# Patient Record
Sex: MALE | Employment: UNEMPLOYED | URBAN - METROPOLITAN AREA
[De-identification: names, ages, dates, MRNs, and addresses within clinical notes are randomized per-mention and may not be internally consistent; named-entity substitution may affect disease eponyms.]

---

## 2018-06-07 ENCOUNTER — HOSPITAL ENCOUNTER (INPATIENT)
Age: 58
LOS: 3 days | Discharge: HOME | DRG: 254 | End: 2018-06-10
Attending: EMERGENCY MEDICINE | Admitting: HOSPITALIST
Payer: MEDICAID

## 2018-06-07 DIAGNOSIS — K25.4 GASTROINTESTINAL HEMORRHAGE ASSOCIATED WITH GASTRIC ULCER: Primary | ICD-10-CM

## 2018-06-07 DIAGNOSIS — F10.288 ALCOHOL DEPENDENCE WITH OTHER ALCOHOL-INDUCED DISORDER (HCC): ICD-10-CM

## 2018-06-07 PROBLEM — I10 HYPERTENSION: Status: ACTIVE | Noted: 2018-06-07

## 2018-06-07 PROBLEM — E87.1 HYPONATREMIA: Status: ACTIVE | Noted: 2018-06-07

## 2018-06-07 PROBLEM — K25.0 ACUTE GASTRIC ULCER WITH HEMORRHAGE: Status: ACTIVE | Noted: 2018-06-07

## 2018-06-07 PROBLEM — F10.10 ALCOHOL ABUSE: Status: ACTIVE | Noted: 2018-06-07

## 2018-06-07 LAB
BASOPHILS # BLD: 0.1 K/UL (ref 0–0.1)
BASOPHILS NFR BLD: 1 % (ref 0–1)
DIFFERENTIAL METHOD BLD: ABNORMAL
EOSINOPHIL # BLD: 0.2 K/UL (ref 0–0.4)
EOSINOPHIL NFR BLD: 2 % (ref 0–7)
ERYTHROCYTE [DISTWIDTH] IN BLOOD BY AUTOMATED COUNT: 18.9 % (ref 11.5–14.5)
HCT VFR BLD AUTO: 21.3 % (ref 36.6–50.3)
HGB BLD-MCNC: 6.5 G/DL (ref 12.1–17)
IMM GRANULOCYTES # BLD: 0 K/UL (ref 0–0.04)
IMM GRANULOCYTES NFR BLD AUTO: 0 % (ref 0–0.5)
LYMPHOCYTES # BLD: 0.8 K/UL (ref 0.8–3.5)
LYMPHOCYTES NFR BLD: 10 % (ref 12–49)
MCH RBC QN AUTO: 23.7 PG (ref 26–34)
MCHC RBC AUTO-ENTMCNC: 30.5 G/DL (ref 30–36.5)
MCV RBC AUTO: 77.7 FL (ref 80–99)
MONOCYTES # BLD: 0.7 K/UL (ref 0–1)
MONOCYTES NFR BLD: 9 % (ref 5–13)
NEUTS SEG # BLD: 5.9 K/UL (ref 1.8–8)
NEUTS SEG NFR BLD: 78 % (ref 32–75)
NRBC # BLD: 0 K/UL (ref 0–0.01)
NRBC BLD-RTO: 0 PER 100 WBC
PLATELET # BLD AUTO: 511 K/UL (ref 150–400)
PMV BLD AUTO: 8.8 FL (ref 8.9–12.9)
RBC # BLD AUTO: 2.74 M/UL (ref 4.1–5.7)
RBC MORPH BLD: ABNORMAL
WBC # BLD AUTO: 7.7 K/UL (ref 4.1–11.1)

## 2018-06-07 PROCEDURE — 74011250636 HC RX REV CODE- 250/636: Performed by: EMERGENCY MEDICINE

## 2018-06-07 PROCEDURE — 99284 EMERGENCY DEPT VISIT MOD MDM: CPT

## 2018-06-07 PROCEDURE — 86923 COMPATIBILITY TEST ELECTRIC: CPT | Performed by: EMERGENCY MEDICINE

## 2018-06-07 PROCEDURE — 96374 THER/PROPH/DIAG INJ IV PUSH: CPT

## 2018-06-07 PROCEDURE — 74011000250 HC RX REV CODE- 250: Performed by: HOSPITALIST

## 2018-06-07 PROCEDURE — 36415 COLL VENOUS BLD VENIPUNCTURE: CPT | Performed by: EMERGENCY MEDICINE

## 2018-06-07 PROCEDURE — 85025 COMPLETE CBC W/AUTO DIFF WBC: CPT | Performed by: EMERGENCY MEDICINE

## 2018-06-07 PROCEDURE — 65660000000 HC RM CCU STEPDOWN

## 2018-06-07 PROCEDURE — 86900 BLOOD TYPING SEROLOGIC ABO: CPT | Performed by: EMERGENCY MEDICINE

## 2018-06-07 PROCEDURE — 86920 COMPATIBILITY TEST SPIN: CPT | Performed by: EMERGENCY MEDICINE

## 2018-06-07 PROCEDURE — 74011250636 HC RX REV CODE- 250/636: Performed by: HOSPITALIST

## 2018-06-07 PROCEDURE — 74011250637 HC RX REV CODE- 250/637: Performed by: HOSPITALIST

## 2018-06-07 PROCEDURE — 94761 N-INVAS EAR/PLS OXIMETRY MLT: CPT

## 2018-06-07 PROCEDURE — C9113 INJ PANTOPRAZOLE SODIUM, VIA: HCPCS | Performed by: HOSPITALIST

## 2018-06-07 RX ORDER — MORPHINE SULFATE ORAL SOLUTION 10 MG/5ML
5 SOLUTION ORAL
Status: DISCONTINUED | OUTPATIENT
Start: 2018-06-07 | End: 2018-06-07

## 2018-06-07 RX ORDER — SODIUM CHLORIDE 0.9 % (FLUSH) 0.9 %
5-10 SYRINGE (ML) INJECTION AS NEEDED
Status: DISCONTINUED | OUTPATIENT
Start: 2018-06-07 | End: 2018-06-10 | Stop reason: HOSPADM

## 2018-06-07 RX ORDER — SUCRALFATE 1 G/10ML
1 SUSPENSION ORAL
Status: DISCONTINUED | OUTPATIENT
Start: 2018-06-07 | End: 2018-06-08

## 2018-06-07 RX ORDER — IBUPROFEN 200 MG
1 TABLET ORAL EVERY 24 HOURS
Status: DISCONTINUED | OUTPATIENT
Start: 2018-06-07 | End: 2018-06-10 | Stop reason: HOSPADM

## 2018-06-07 RX ORDER — FENTANYL CITRATE 50 UG/ML
50 INJECTION, SOLUTION INTRAMUSCULAR; INTRAVENOUS
Status: COMPLETED | OUTPATIENT
Start: 2018-06-07 | End: 2018-06-07

## 2018-06-07 RX ORDER — LORAZEPAM 2 MG/ML
2 INJECTION INTRAMUSCULAR
Status: DISCONTINUED | OUTPATIENT
Start: 2018-06-07 | End: 2018-06-10 | Stop reason: HOSPADM

## 2018-06-07 RX ORDER — SODIUM CHLORIDE 9 MG/ML
250 INJECTION, SOLUTION INTRAVENOUS AS NEEDED
Status: DISCONTINUED | OUTPATIENT
Start: 2018-06-07 | End: 2018-06-09 | Stop reason: SDUPTHER

## 2018-06-07 RX ORDER — IPRATROPIUM BROMIDE AND ALBUTEROL SULFATE 2.5; .5 MG/3ML; MG/3ML
3 SOLUTION RESPIRATORY (INHALATION)
Status: DISCONTINUED | OUTPATIENT
Start: 2018-06-07 | End: 2018-06-10 | Stop reason: HOSPADM

## 2018-06-07 RX ORDER — CEPHALEXIN 125 MG/5ML
500 POWDER, FOR SUSPENSION ORAL EVERY 6 HOURS
Status: DISCONTINUED | OUTPATIENT
Start: 2018-06-07 | End: 2018-06-10 | Stop reason: HOSPADM

## 2018-06-07 RX ORDER — HYDRALAZINE HYDROCHLORIDE 20 MG/ML
10 INJECTION INTRAMUSCULAR; INTRAVENOUS
Status: DISCONTINUED | OUTPATIENT
Start: 2018-06-07 | End: 2018-06-10 | Stop reason: HOSPADM

## 2018-06-07 RX ORDER — MORPHINE SULFATE ORAL SOLUTION 10 MG/5ML
5 SOLUTION ORAL
Status: DISCONTINUED | OUTPATIENT
Start: 2018-06-07 | End: 2018-06-10 | Stop reason: HOSPADM

## 2018-06-07 RX ORDER — SODIUM CHLORIDE 9 MG/ML
100 INJECTION, SOLUTION INTRAVENOUS CONTINUOUS
Status: DISCONTINUED | OUTPATIENT
Start: 2018-06-07 | End: 2018-06-09

## 2018-06-07 RX ORDER — MORPHINE SULFATE 4 MG/ML
1 INJECTION INTRAVENOUS
Status: DISCONTINUED | OUTPATIENT
Start: 2018-06-07 | End: 2018-06-10 | Stop reason: HOSPADM

## 2018-06-07 RX ORDER — DOCUSATE SODIUM 100 MG/1
100 CAPSULE, LIQUID FILLED ORAL 2 TIMES DAILY
Status: DISCONTINUED | OUTPATIENT
Start: 2018-06-07 | End: 2018-06-10 | Stop reason: HOSPADM

## 2018-06-07 RX ORDER — ONDANSETRON 2 MG/ML
4 INJECTION INTRAMUSCULAR; INTRAVENOUS
Status: DISCONTINUED | OUTPATIENT
Start: 2018-06-07 | End: 2018-06-10 | Stop reason: HOSPADM

## 2018-06-07 RX ORDER — LORAZEPAM 2 MG/ML
1 INJECTION INTRAMUSCULAR
Status: DISCONTINUED | OUTPATIENT
Start: 2018-06-07 | End: 2018-06-10 | Stop reason: HOSPADM

## 2018-06-07 RX ORDER — SODIUM CHLORIDE 0.9 % (FLUSH) 0.9 %
5-10 SYRINGE (ML) INJECTION EVERY 8 HOURS
Status: DISCONTINUED | OUTPATIENT
Start: 2018-06-07 | End: 2018-06-10 | Stop reason: HOSPADM

## 2018-06-07 RX ADMIN — FOLIC ACID: 5 INJECTION, SOLUTION INTRAMUSCULAR; INTRAVENOUS; SUBCUTANEOUS at 23:46

## 2018-06-07 RX ADMIN — MORPHINE SULFATE 1 MG: 4 INJECTION INTRAVENOUS at 23:57

## 2018-06-07 RX ADMIN — DOCUSATE SODIUM 100 MG: 100 CAPSULE, LIQUID FILLED ORAL at 19:19

## 2018-06-07 RX ADMIN — SODIUM CHLORIDE 40 MG: 9 INJECTION INTRAMUSCULAR; INTRAVENOUS; SUBCUTANEOUS at 20:47

## 2018-06-07 RX ADMIN — MORPHINE SULFATE 1 MG: 4 INJECTION INTRAVENOUS at 19:20

## 2018-06-07 RX ADMIN — SODIUM CHLORIDE 100 ML/HR: 900 INJECTION, SOLUTION INTRAVENOUS at 20:46

## 2018-06-07 RX ADMIN — FENTANYL CITRATE 50 MCG: 50 INJECTION, SOLUTION INTRAMUSCULAR; INTRAVENOUS at 15:04

## 2018-06-07 RX ADMIN — Medication 10 ML: at 19:20

## 2018-06-07 RX ADMIN — CEPHALEXIN 500 MG: 125 FOR SUSPENSION ORAL at 23:46

## 2018-06-07 RX ADMIN — SUCRALFATE 1 G: 1 SUSPENSION ORAL at 19:19

## 2018-06-07 NOTE — IP AVS SNAPSHOT
[address] [facility] 
[phone] Patient: Mónica Michaud MRN: [id number] JAMES:[birth year] About your hospitalization You were admitted on:  June 7, 2018 You last received care in the:  [facility] 2 GENERAL SURGERY You were discharged on:  Soila 10, 2018 Why you were hospitalized Your primary diagnosis was:  Acute Gastric Ulcer With Hemorrhage Your diagnoses also included:  Alcohol Abuse, Hypertension, Hyponatremia, Copd (Chronic Obstructive Pulmonary Disease) (Hcc), Tobacco Abuse Follow-up Information Follow up With Details Comments Contact Info Sarina Wiggins MD Schedule an appointment as soon as possible for a visit in 1 week hospital follow-up [facility] 6 
[phone] Viri Thompson MD Schedule an appointment as soon as possible for a visit in 2 weeks call office in 2 days to follow on pathology result  [address] [facility] [address] Gastrointestinal [address] [place] [address] 
[phone] Discharge Orders None A check alexandra indicates which time of day the medication should be taken. My Medications START taking these medications Instructions Each Dose to Equal  
 Morning Noon Evening Bedtime  
 amLODIPine 10 mg tablet Commonly known as:  Erenest Kayla Start taking on:  6/11/2018 Your last dose was: Your next dose is: Take 1 Tab by mouth daily. 10 mg  
    
   
   
   
  
 cephALEXin 125 mg/5 mL suspension Commonly known as:  aGry Shelley Your last dose was: Your next dose is: Take 20 mL by mouth three (3) times daily for 3 days. 500 mg  
    
   
   
   
  
 L. acidoph & paracasei- S therm- Bifido 8 billion cell Cap cap Commonly known as:  ELLEN-Q/RISAQUAD Start taking on:  6/11/2018 Your last dose was: Your next dose is: Take 1 Cap by mouth daily. 1 Cap  
    
   
   
   
  
 naloxone 4 mg/actuation nasal spray Commonly known as:  [employer] Your last dose was: Your next dose is:    
   
   
 Use 1 spray intranasally into 1 nostril. Call 911. Use a new Narcan nasal spray for subsequent doses and administer into alternating nostrils. May repeat every 2 to 3 minutes as needed. oxyCODONE-acetaminophen 5-325 mg per tablet Commonly known as:  PERCOCET Your last dose was: Your next dose is: Take 1 Tab by mouth every four (4) hours as needed for Pain. Max Daily Amount: 6 Tabs. 1 Tab  
    
   
   
   
  
 pantoprazole 40 mg tablet Commonly known as:  PROTONIX Your last dose was: Your next dose is: Take 1 Tab by mouth Before breakfast and dinner. 40 mg  
    
   
   
   
  
 sucralfate 100 mg/mL suspension Commonly known as:  Curvin Hilt Your last dose was: Your next dose is: Take 10 mL by mouth Before breakfast, lunch, dinner and at bedtime for 30 days. 1 g CONTINUE taking these medications Instructions Each Dose to Equal  
 Morning Noon Evening Bedtime Iron 325 mg (65 mg iron) tablet Generic drug:  ferrous sulfate Your last dose was: Your next dose is: Take 325 mg by mouth Daily (before breakfast). 325 mg  
    
   
   
   
  
 multivitamin tablet Commonly known as:  ONE A DAY Your last dose was: Your next dose is: Take 1 Tab by mouth daily. 1 Tab STOP taking these medications ALEVE 220 mg tablet Generic drug:  naproxen sodium TYLENOL PM PO Where to Get Your Medications Information on where to get these meds will be given to you by the nurse or doctor. ! Ask your nurse or doctor about these medications  
  amLODIPine 10 mg tablet cephALEXin 125 mg/5 mL suspension L. acidoph & paracasei- S therm- Bifido 8 billion cell Cap cap  
 naloxone 4 mg/actuation nasal spray  
 oxyCODONE-acetaminophen 5-325 mg per tablet  
 pantoprazole 40 mg tablet  
 sucralfate 100 mg/mL suspension Opioid Education Prescription Opioids: What You Need to Know: 
 
 
Large gastric ulcer 4cm  
-follow blood work to follow on bleeding  
-call Dr Tricia Cerna (GI) office next week to follow on pathology result. You will need another EGD in 8 weeks to follow on healing  
-take protonix and carafate for stomach protection, discussed with Dr Tricia Cerna for how long you have to take it Mild cellulitis of abdominal wall 
-complete antibiotic  
   
Low sodium  
-you will need blood work next week  
   
High blood pressure  
- your blood pressure was on the high side, so you were started on Norvasc Take Home Medications You are being discharge on antibiotic Keflex for treatment of around PEG cellulitis What you should know about antibiotics: Antibiotics are medicines that help people fight infections caused by bacteria. They work by killing bacteria that are in the body. Antibiotics can cause side effects, such as nausea, vomiting. Nausea is a common side effect of many antibiotics. It is not an allergic reaction. If you are a woman and you get a yeast infection after taking an antibiotic, that does not mean you are allergic to it. Yeast infections are a common side effect of antibiotics.  
One of the most important side effect to watch while taking antibiotic or after you just finished taking it is diarrhea. This type of diarrhea may be caused by an infection with bacteria called C. difficile. C. difficile normally lives in the intestines. When people are on antibiotics, the C. difficile in their intestines can overgrow and cause infection. If you develop any side effect especially diarrhea while taking antibiotics it is very important to contact your doctor. We recommend taking probiotics while taking antibiotics. Probiotics can be bought over the counter. Allergies to antibiotics are common. You can develop an allergy to an antibiotic, even if you have not had a problem with it before. Symptoms of antibiotic allergy can be mild and include a flat rash and itching. They can also be more serious and include:  
   -----  Hives - are raised, red patches of skin that are usually very itchy  
   -----  Lip or tongue swelling 
   ------ Trouble swallowing or breathing Serious allergy symptoms can start right after you start taking an antibiotic if you are very sensitive. Less serious symptoms, on the other hand, often start a day or more later. If you think you are allergic to antibiotics tell your doctor. General drug facts If you have a very bad allergy, wear an allergy ID at all times. It is important that you take the medication exactly as they are prescribed. Keep your medication in the bottles provided by the pharmacist. 
Keep a list of all your drugs (prescription, natural products, vitamins, OTC) with you. Give this list to your doctor. Do not take other medications without consulting your doctor. Do not share your drugs with others and do not take anyone else's drugs. Keep all drugs out of the reach of children and pets. Most drugs may be thrown away in household trash after mixing with coffee grounds or rocco litter and sealing in a plastic bag. Keep a list Call your doctor for help with any side effects.  If in the [place] you may also call the FDA at 0-023-HGQ-8536 Talk with the doctor before starting any new drug, including OTC, natural products, or vitamins. What to do at [facility] 1. Recommended diet: soft diet 2. Recommended activity: Activity as tolerated and No driving while on analgesics 3. If you experience any of the following symptoms then please call your primary care physician or return to the emergency room if you cannot get hold of your doctor: worsening abdominal pain ; nausea/ vomiting; bleeding 4. Lab work: you will need blood work in 2-3 days 6. Stop smocking and avoid alcohol 7. Bring these papers with you to your follow up appointments. The papers will help your doctors be sure to continue the care plan from the hospital. 
 
 
Follow-up with:  
PCP: Fei Angeles MD 
Follow-up Information Follow up With Details Comments Contact Info Fei Angeles MD In 1 week  [address] [place] [address] 
[phone] Allison Whiteside MD In 2 weeks call office in 2 days to follow on pathology result  [address] [place] [address] [address] Gastrointestinal [address] [place] [address] 
[phone] Please call for your own appointment Information obtained by : 
I understand that if any problems occur once I am at home I am to contact my physician. I understand and acknowledge receipt of the instructions indicated above. Physician's or R.N.'s Signature                                                                  Date/Time Patient or Representative Signature                                                          Date/Time [employer] Announcement We are excited to announce that we are making your provider's discharge notes available to you in [employer] You will see these notes when they are completed and signed by the physician that discharged you from your recent hospital stay. If you have any questions or concerns about any information you see in [employer] please call the Health Information Department where you were seen or reach out to your Primary Care Provider for more information about your plan of care. Introducing [facility] & [facility] SERVICES! Terra Swan introduces [employer] patient portal. Now you can access parts of your medical record, email your doctor's office, and request medication refills online. 1. In your internet browser, go to [email or web address] 2. Click on the First Time User? Click Here link in the Sign In box. You will see the New Member Sign Up page. 3. Enter your [employer] Access Code exactly as it appears below. You will not need to use this code after youve completed the sign-up process. If you do not sign up before the expiration date, you must request a new code. · [employer] Access Code: [email or web address] Expires: 9/5/2018  7:47 AM 
 
4. Enter the last four digits of your Social Security Number (xxxx) and Date of Birth (mm/dd/yyyy) as indicated and click Submit. You will be taken to the next sign-up page. 5. Create a [employer] ID. This will be your [employer] login ID and cannot be changed, so think of one that is secure and easy to remember. 6. Create a [employer] password. You can change your password at any time. 7. Enter your Password Reset Question and Answer. This can be used at a later time if you forget your password. 8. Enter your e-mail address. You will receive e-mail notification when new information is available in [address] 9. Click Sign Up. You can now view and download portions of your medical record.  
10. Click the Download Summary menu link to download a portable copy of your medical information. If you have questions, please visit the Frequently Asked Questions section of the [employer] website. Remember, Lydia is NOT to be used for urgent needs. For medical emergencies, dial 911. Now available from your iPhone and Android! Introducing Bj Zabala As a [facility] patient, I wanted to make you aware of our electronic visit tool called Bj Zabala. [facility] 24/7 allows you to connect within minutes with a medical provider 24 hours a day, seven days a week via a mobile device or tablet or logging into a secure website from your computer. You can access Bj Zabala from anywhere in the [place]. A virtual visit might be right for you when you have a simple condition and feel like you just dont want to get out of bed, or cant get away from work for an appointment, when your regular [facility] provider is not available (evenings, weekends or holidays), or when youre out of town and need minor care. Electronic visits cost only $49 and if the [facility] cWyze/7 provider determines a prescription is needed to treat your condition, one can be electronically transmitted to a nearby pharmacy*. Please take a moment to enroll today if you have not already done so. The enrollment process is free and takes just a few minutes. To enroll, please download the [facility] [email or web address] colin to your tablet or phone, or visit [email or web address] to enroll on your computer. And, as an [address] [place] [address] patient with a [employer] account, the results of your visits will be scanned into your electronic medical record and your primary care provider will be able to view the scanned results. We urge you to continue to see your regular [facility] provider for your ongoing medical care.   And while your primary care provider may not be the one available when you seek a Bj Zabala virtual visit, the peace of mind you get from getting a real diagnosis real time can be priceless. For more information on Bj Zabala, view our Frequently Asked Questions (FAQs) at [email or web address]. Sincerely, 
 
Jennifer Siddiqui MD 
Chief Medical Officer Arnav Desir *:  certain medications cannot be prescribed via Bj Zabala Providers Seen During Your Hospitalization Provider Specialty Primary office phone Steph Henderson DO Emergency Medicine [phone] Hannah Johnson MD Internal Medicine [phone] Frances Murillo MD Internal Medicine [phone] Your Primary Care Physician (PCP) Primary Care Physician Office Phone Office Fax [facility] [place] [phone] You are allergic to the following No active allergies Recent Documentation Weight BMI Smoking Status 51.6 kg 16.56 kg/m2 Current Every Day Smoker Emergency Contacts Name Discharge Info Relation Home Work Mobile [facility] DISCHARGE CAREGIVER [3] Spouse [3] [phone] [address] [place] [address] [5] Spouse [3] [phone] Patient Belongings The following personal items are in your possession at time of discharge: 
  Dental Appliances: None  Visual Aid: Glasses, With patient      Home Medications: None   Jewelry: None  Clothing: At bedside    Other Valuables: None Please provide this summary of care documentation to your next provider. Signatures-by signing, you are acknowledging that this After Visit Summary has been reviewed with you and you have received a copy. Patient Signature:  ____________________________________________________________ Date:  ____________________________________________________________  
  
[facility] Signature:  ____________________________________________________________ Date:  ____________________________________________________________
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
## 2018-06-07 NOTE — PROGRESS NOTES
Pharmacy Clarification of Prior to Admission Medication Regimen     The patient was interviewed regarding clarification of the prior to admission medication regimen and was questioned regarding use of any other inhalers, topical products, over the counter medications, herbal medications, vitamin products or ophthalmic/nasal/otic medication use. Information Obtained From: Patient    Pertinent Pharmacy Findings:   ferrous sulfate (IRON) 325 mg (65 mg iron) tablet: Patient stated he 'sometimes forgets' to take this agent    PTA medication list was corrected to the following:     Prior to Admission Medications   Prescriptions Last Dose Informant Patient Reported? Taking? ACETAMINOPHEN/DIPHENHYDRAMINE (TYLENOL PM PO) 6/6/2018 at Unknown time Self Yes Yes   Sig: Take 1 Tab by mouth nightly. ferrous sulfate (IRON) 325 mg (65 mg iron) tablet 5/31/2018 at Unknown time Self Yes Yes   Sig: Take 325 mg by mouth Daily (before breakfast). multivitamin (ONE A DAY) tablet 6/5/2018 at Unknown time Self Yes Yes   Sig: Take 1 Tab by mouth daily. naproxen sodium (ALEVE) 220 mg tablet 6/6/2018 at Unknown time Self Yes Yes   Sig: Take 660 mg by mouth three (3) times daily.       Facility-Administered Medications: None          Thank you,  Eual Schlatter, CPhT  Medication History Pharmacy Technician
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
## 2018-06-07 NOTE — ED NOTES
Bedside shift change report given to CamRN (oncoming nurse) by Emy Caro RN (offgoing nurse). Report included the following information ED Summary.

## 2018-06-07 NOTE — ED NOTES
Forgot to waste fentanyl prior to patient's discharge, discarded and witnesses by Lawrence Earl RN now. Unable to waste in the Pyxis.

## 2018-06-07 NOTE — ED NOTES
TRANSFER - OUT REPORT:    Verbal report given to (name) sadia Gomez RN   being transferred to Gen Surg(unit) for routine progression of care       Report consisted of patients Situation, Background, Assessment and   Recommendations(SBAR). Information from the following report(s) SBAR, Kardex and ED Summary was reviewed with the receiving nurse. Lines:       Opportunity for questions and clarification was provided.       Patient transported with:   [employer]

## 2018-06-08 ENCOUNTER — ANESTHESIA EVENT (OUTPATIENT)
Dept: ENDOSCOPY | Age: 58
DRG: 254 | End: 2018-06-08
Payer: MEDICAID

## 2018-06-08 ENCOUNTER — ANESTHESIA (OUTPATIENT)
Dept: ENDOSCOPY | Age: 58
DRG: 254 | End: 2018-06-08
Payer: MEDICAID

## 2018-06-08 PROBLEM — Z72.0 TOBACCO ABUSE: Status: ACTIVE | Noted: 2018-06-08

## 2018-06-08 PROBLEM — J44.9 COPD (CHRONIC OBSTRUCTIVE PULMONARY DISEASE) (HCC): Chronic | Status: ACTIVE | Noted: 2018-06-08

## 2018-06-08 LAB
ALBUMIN SERPL-MCNC: 2.5 G/DL (ref 3.5–5)
ALBUMIN/GLOB SERPL: 0.6 {RATIO} (ref 1.1–2.2)
ALP SERPL-CCNC: 73 U/L (ref 45–117)
ALT SERPL-CCNC: 10 U/L (ref 12–78)
ANION GAP SERPL CALC-SCNC: 7 MMOL/L (ref 5–15)
AST SERPL-CCNC: 9 U/L (ref 15–37)
BILIRUB SERPL-MCNC: 0.5 MG/DL (ref 0.2–1)
BUN SERPL-MCNC: 6 MG/DL (ref 6–20)
BUN/CREAT SERPL: 15 (ref 12–20)
CALCIUM SERPL-MCNC: 8.5 MG/DL (ref 8.5–10.1)
CHLORIDE SERPL-SCNC: 101 MMOL/L (ref 97–108)
CO2 SERPL-SCNC: 26 MMOL/L (ref 21–32)
CREAT SERPL-MCNC: 0.41 MG/DL (ref 0.7–1.3)
GLOBULIN SER CALC-MCNC: 3.9 G/DL (ref 2–4)
GLUCOSE SERPL-MCNC: 76 MG/DL (ref 65–100)
HCT VFR BLD AUTO: 22.4 % (ref 36.6–50.3)
HGB BLD-MCNC: 6.9 G/DL (ref 12.1–17)
IRON SATN MFR SERPL: 30 % (ref 20–50)
IRON SERPL-MCNC: 81 UG/DL (ref 35–150)
MAGNESIUM SERPL-MCNC: 1.6 MG/DL (ref 1.6–2.4)
PHOSPHATE SERPL-MCNC: 3.6 MG/DL (ref 2.6–4.7)
POTASSIUM SERPL-SCNC: 3.9 MMOL/L (ref 3.5–5.1)
PROT SERPL-MCNC: 6.4 G/DL (ref 6.4–8.2)
SODIUM SERPL-SCNC: 134 MMOL/L (ref 136–145)
TIBC SERPL-MCNC: 266 UG/DL (ref 250–450)

## 2018-06-08 PROCEDURE — 65660000000 HC RM CCU STEPDOWN

## 2018-06-08 PROCEDURE — 80053 COMPREHEN METABOLIC PANEL: CPT | Performed by: HOSPITALIST

## 2018-06-08 PROCEDURE — 74011000250 HC RX REV CODE- 250

## 2018-06-08 PROCEDURE — 74011250637 HC RX REV CODE- 250/637: Performed by: HOSPITALIST

## 2018-06-08 PROCEDURE — 97165 OT EVAL LOW COMPLEX 30 MIN: CPT

## 2018-06-08 PROCEDURE — 76040000019: Performed by: INTERNAL MEDICINE

## 2018-06-08 PROCEDURE — 97161 PT EVAL LOW COMPLEX 20 MIN: CPT

## 2018-06-08 PROCEDURE — 76060000031 HC ANESTHESIA FIRST 0.5 HR: Performed by: INTERNAL MEDICINE

## 2018-06-08 PROCEDURE — 88342 IMHCHEM/IMCYTCHM 1ST ANTB: CPT | Performed by: INTERNAL MEDICINE

## 2018-06-08 PROCEDURE — G8987 SELF CARE CURRENT STATUS: HCPCS

## 2018-06-08 PROCEDURE — 97116 GAIT TRAINING THERAPY: CPT

## 2018-06-08 PROCEDURE — 74011250637 HC RX REV CODE- 250/637: Performed by: INTERNAL MEDICINE

## 2018-06-08 PROCEDURE — 84100 ASSAY OF PHOSPHORUS: CPT | Performed by: HOSPITALIST

## 2018-06-08 PROCEDURE — 36430 TRANSFUSION BLD/BLD COMPNT: CPT

## 2018-06-08 PROCEDURE — 36415 COLL VENOUS BLD VENIPUNCTURE: CPT | Performed by: HOSPITALIST

## 2018-06-08 PROCEDURE — P9016 RBC LEUKOCYTES REDUCED: HCPCS | Performed by: EMERGENCY MEDICINE

## 2018-06-08 PROCEDURE — 88305 TISSUE EXAM BY PATHOLOGIST: CPT | Performed by: INTERNAL MEDICINE

## 2018-06-08 PROCEDURE — 74011250636 HC RX REV CODE- 250/636

## 2018-06-08 PROCEDURE — 85018 HEMOGLOBIN: CPT | Performed by: HOSPITALIST

## 2018-06-08 PROCEDURE — 74011250636 HC RX REV CODE- 250/636: Performed by: INTERNAL MEDICINE

## 2018-06-08 PROCEDURE — G8988 SELF CARE GOAL STATUS: HCPCS

## 2018-06-08 PROCEDURE — 83735 ASSAY OF MAGNESIUM: CPT | Performed by: HOSPITALIST

## 2018-06-08 PROCEDURE — 0DB78ZX EXCISION OF STOMACH, PYLORUS, VIA NATURAL OR ARTIFICIAL OPENING ENDOSCOPIC, DIAGNOSTIC: ICD-10-PCS | Performed by: INTERNAL MEDICINE

## 2018-06-08 PROCEDURE — 83540 ASSAY OF IRON: CPT | Performed by: HOSPITALIST

## 2018-06-08 PROCEDURE — 77030019988 HC FCPS ENDOSC DISP BSC -B: Performed by: INTERNAL MEDICINE

## 2018-06-08 PROCEDURE — 30233N1 TRANSFUSION OF NONAUTOLOGOUS RED BLOOD CELLS INTO PERIPHERAL VEIN, PERCUTANEOUS APPROACH: ICD-10-PCS | Performed by: HOSPITALIST

## 2018-06-08 PROCEDURE — 97530 THERAPEUTIC ACTIVITIES: CPT

## 2018-06-08 PROCEDURE — G8989 SELF CARE D/C STATUS: HCPCS

## 2018-06-08 PROCEDURE — 74011250636 HC RX REV CODE- 250/636: Performed by: HOSPITALIST

## 2018-06-08 RX ORDER — SODIUM CHLORIDE 9 MG/ML
25 INJECTION, SOLUTION INTRAVENOUS CONTINUOUS
Status: DISCONTINUED | OUTPATIENT
Start: 2018-06-08 | End: 2018-06-08

## 2018-06-08 RX ORDER — LIDOCAINE HYDROCHLORIDE 20 MG/ML
INJECTION, SOLUTION EPIDURAL; INFILTRATION; INTRACAUDAL; PERINEURAL AS NEEDED
Status: DISCONTINUED | OUTPATIENT
Start: 2018-06-08 | End: 2018-06-08 | Stop reason: HOSPADM

## 2018-06-08 RX ORDER — SUCRALFATE 1 G/10ML
1 SUSPENSION ORAL
Status: DISCONTINUED | OUTPATIENT
Start: 2018-06-08 | End: 2018-06-10 | Stop reason: HOSPADM

## 2018-06-08 RX ORDER — ATROPINE SULFATE 0.1 MG/ML
0.5 INJECTION INTRAVENOUS
Status: DISCONTINUED | OUTPATIENT
Start: 2018-06-08 | End: 2018-06-08 | Stop reason: HOSPADM

## 2018-06-08 RX ORDER — DEXTROMETHORPHAN/PSEUDOEPHED 2.5-7.5/.8
1.2 DROPS ORAL
Status: DISCONTINUED | OUTPATIENT
Start: 2018-06-08 | End: 2018-06-08 | Stop reason: HOSPADM

## 2018-06-08 RX ORDER — SUCRALFATE 1 G/1
1 TABLET ORAL
Status: DISCONTINUED | OUTPATIENT
Start: 2018-06-08 | End: 2018-06-08

## 2018-06-08 RX ORDER — PANTOPRAZOLE SODIUM 40 MG/1
40 TABLET, DELAYED RELEASE ORAL
Status: DISCONTINUED | OUTPATIENT
Start: 2018-06-08 | End: 2018-06-10 | Stop reason: HOSPADM

## 2018-06-08 RX ORDER — FLUMAZENIL 0.1 MG/ML
0.2 INJECTION INTRAVENOUS
Status: DISCONTINUED | OUTPATIENT
Start: 2018-06-08 | End: 2018-06-08 | Stop reason: HOSPADM

## 2018-06-08 RX ORDER — SODIUM CHLORIDE 0.9 % (FLUSH) 0.9 %
5-10 SYRINGE (ML) INJECTION AS NEEDED
Status: ACTIVE | OUTPATIENT
Start: 2018-06-08 | End: 2018-06-08

## 2018-06-08 RX ORDER — PROPOFOL 10 MG/ML
INJECTION, EMULSION INTRAVENOUS AS NEEDED
Status: DISCONTINUED | OUTPATIENT
Start: 2018-06-08 | End: 2018-06-08 | Stop reason: HOSPADM

## 2018-06-08 RX ORDER — EPINEPHRINE 0.1 MG/ML
1 INJECTION INTRACARDIAC; INTRAVENOUS
Status: DISCONTINUED | OUTPATIENT
Start: 2018-06-08 | End: 2018-06-08 | Stop reason: HOSPADM

## 2018-06-08 RX ORDER — NALOXONE HYDROCHLORIDE 0.4 MG/ML
0.4 INJECTION, SOLUTION INTRAMUSCULAR; INTRAVENOUS; SUBCUTANEOUS
Status: DISCONTINUED | OUTPATIENT
Start: 2018-06-08 | End: 2018-06-08 | Stop reason: HOSPADM

## 2018-06-08 RX ORDER — SODIUM CHLORIDE 0.9 % (FLUSH) 0.9 %
5-10 SYRINGE (ML) INJECTION EVERY 8 HOURS
Status: COMPLETED | OUTPATIENT
Start: 2018-06-08 | End: 2018-06-08

## 2018-06-08 RX ORDER — SODIUM CHLORIDE 0.9 % (FLUSH) 0.9 %
5-10 SYRINGE (ML) INJECTION EVERY 8 HOURS
Status: DISPENSED | OUTPATIENT
Start: 2018-06-08 | End: 2018-06-08

## 2018-06-08 RX ORDER — SODIUM CHLORIDE 9 MG/ML
250 INJECTION, SOLUTION INTRAVENOUS AS NEEDED
Status: DISCONTINUED | OUTPATIENT
Start: 2018-06-08 | End: 2018-06-10 | Stop reason: HOSPADM

## 2018-06-08 RX ORDER — GLYCOPYRROLATE 0.2 MG/ML
INJECTION INTRAMUSCULAR; INTRAVENOUS AS NEEDED
Status: DISCONTINUED | OUTPATIENT
Start: 2018-06-08 | End: 2018-06-08 | Stop reason: HOSPADM

## 2018-06-08 RX ADMIN — SUCRALFATE 1 G: 1 SUSPENSION ORAL at 20:44

## 2018-06-08 RX ADMIN — LIDOCAINE HYDROCHLORIDE 40 MG: 20 INJECTION, SOLUTION EPIDURAL; INFILTRATION; INTRACAUDAL; PERINEURAL at 08:30

## 2018-06-08 RX ADMIN — PROPOFOL 10 MG: 10 INJECTION, EMULSION INTRAVENOUS at 08:31

## 2018-06-08 RX ADMIN — DOCUSATE SODIUM 100 MG: 100 CAPSULE, LIQUID FILLED ORAL at 10:21

## 2018-06-08 RX ADMIN — MORPHINE SULFATE 1 MG: 4 INJECTION INTRAVENOUS at 14:45

## 2018-06-08 RX ADMIN — SUCRALFATE 1 G: 1 SUSPENSION ORAL at 17:39

## 2018-06-08 RX ADMIN — GLYCOPYRROLATE 0.1 MG: 0.2 INJECTION INTRAMUSCULAR; INTRAVENOUS at 08:28

## 2018-06-08 RX ADMIN — PANTOPRAZOLE SODIUM 40 MG: 40 TABLET, DELAYED RELEASE ORAL at 17:39

## 2018-06-08 RX ADMIN — MORPHINE SULFATE 1 MG: 4 INJECTION INTRAVENOUS at 05:10

## 2018-06-08 RX ADMIN — CEPHALEXIN 500 MG: 125 FOR SUSPENSION ORAL at 13:34

## 2018-06-08 RX ADMIN — PROPOFOL 10 MG: 10 INJECTION, EMULSION INTRAVENOUS at 08:34

## 2018-06-08 RX ADMIN — SODIUM CHLORIDE 100 ML/HR: 900 INJECTION, SOLUTION INTRAVENOUS at 14:49

## 2018-06-08 RX ADMIN — DOCUSATE SODIUM 100 MG: 100 CAPSULE, LIQUID FILLED ORAL at 17:33

## 2018-06-08 RX ADMIN — PROPOFOL 10 MG: 10 INJECTION, EMULSION INTRAVENOUS at 08:36

## 2018-06-08 RX ADMIN — MORPHINE SULFATE 1 MG: 4 INJECTION INTRAVENOUS at 18:33

## 2018-06-08 RX ADMIN — SODIUM CHLORIDE 100 ML/HR: 900 INJECTION, SOLUTION INTRAVENOUS at 08:16

## 2018-06-08 RX ADMIN — SUCRALFATE 1 G: 1 SUSPENSION ORAL at 13:02

## 2018-06-08 RX ADMIN — LORAZEPAM 2 MG: 2 INJECTION INTRAMUSCULAR; INTRAVENOUS at 10:20

## 2018-06-08 RX ADMIN — CEPHALEXIN 500 MG: 125 FOR SUSPENSION ORAL at 17:39

## 2018-06-08 RX ADMIN — PROPOFOL 10 MG: 10 INJECTION, EMULSION INTRAVENOUS at 08:35

## 2018-06-08 RX ADMIN — PROPOFOL 10 MG: 10 INJECTION, EMULSION INTRAVENOUS at 08:33

## 2018-06-08 RX ADMIN — SODIUM CHLORIDE: 900 INJECTION, SOLUTION INTRAVENOUS at 08:11

## 2018-06-08 RX ADMIN — Medication 10 ML: at 20:43

## 2018-06-08 RX ADMIN — MORPHINE SULFATE 1 MG: 4 INJECTION INTRAVENOUS at 10:20

## 2018-06-08 RX ADMIN — CEPHALEXIN 500 MG: 125 FOR SUSPENSION ORAL at 05:10

## 2018-06-08 RX ADMIN — PROPOFOL 50 MG: 10 INJECTION, EMULSION INTRAVENOUS at 08:30

## 2018-06-08 RX ADMIN — Medication 10 ML: at 13:35

## 2018-06-08 RX ADMIN — Medication 5 ML: at 20:43

## 2018-06-08 RX ADMIN — Medication 10 ML: at 10:22

## 2018-06-08 RX ADMIN — HYDRALAZINE HYDROCHLORIDE 10 MG: 20 INJECTION INTRAMUSCULAR; INTRAVENOUS at 07:33

## 2018-06-08 RX ADMIN — PROPOFOL 10 MG: 10 INJECTION, EMULSION INTRAVENOUS at 08:32

## 2018-06-08 NOTE — PROGRESS NOTES
Hospitalist Progress Note    NAME: Kaitlyn Kent   :  1960   MRN:  [id number]       Assessment / Plan:  GI bleed with heme positive stool, POA  Acute on chronic iron deficiency anemia, POA due to GI bleed  Large gastric ulcer 4cm with circumferential thickening of distal stomach, antrum and pylorus, POA. Need to rule out malignancy  Abdominal pain due to above  Thickening of overlying left rectus muscle  Mild cellulitis of abdominal wall  - Hb 7.2 on admission, 6.9 today  S/p 1 PRBC   -No NSAIDs   --IV PPI bid and carafate   --EGD today   --keflex x 5 days and see if redness improves  --no perforation on CT.  --morphine liquid for pain     Hyponatremia   - Na 128--> 134   --suspect due to dehydration rather than SIADH. Urine SG elevated 1.050.  --IVF with goody bag and NS.     Tobacco abuse  Moderate to severe emphysema  --nicotine patch  --duoneb prn     Elevated blood pressures  --likely pain contributing   -No hx HTN. IV hydralazine prn     Alcohol abuse  Hx DT and seizure  --10-12 beers a week, has cut down. --no tremor, diaphoresis, agitation to suggest withdrawal at this time. --daily good bag x 3 doses . Ativan IV prn CIWA protocol.     Hx throat cancer s/p XRT and chemotx with cisplatin. --last note from Dr. Janak Reyes 2015 indicated probably metastatic disease to lung base on PET CT scan and bone scan. Lost to follow up. PCP's note 2016 mentioned he did not want to go through chemo treatment anymore. --port was removed 12/15 due to infection. --Patient says if he is found to have cancer, he does not want any treatment for cancer. Depending on EGD findings/pathology will consider CT neck and chest to see metastatic disease vs home hospice consult depending on patient's wishes.     Hx bladder CA s/p TURP          Code: DNR/DNI.     DVT prophylaxis:  SCD  Surrogate decision maker:  wife    Baseline: lives with wife, ambulating with walker   Recommended Disposition: Home w/Family Subjective:     Chief Complaint / Reason for Physician Visit: following GI bleeding / anemia / hyponatremia   + abdominal pain     Discussed with RN events overnight. Review of Systems:  Symptom Y/N Comments  Symptom Y/N Comments   Fever/Chills n   Chest Pain     Poor Appetite    Edema     Cough    Abdominal Pain y    Sputum    Joint Pain     SOB/ARROYO n   Pruritis/Rash     Nausea/vomit n   Tolerating PT/OT     Diarrhea    Tolerating Diet     Constipation    Other       Could NOT obtain due to:      Objective:     VITALS:   Last 24hrs VS reviewed since prior progress note. Most recent are:  Patient Vitals for the past 24 hrs:   Temp Pulse Resp BP SpO2   06/08/18 0723 97.8 °F (36.6 °C) 73 15 (!) 169/94 100 %   06/08/18 0346 98.1 °F (36.7 °C) 67 16 144/87 100 %   06/08/18 0049 98.3 °F (36.8 °C) 78 17 121/85 99 %   06/08/18 0038 98.4 °F (36.9 °C) 76 16 - 100 %   06/08/18 0030 98.1 °F (36.7 °C) 81 17 151/86 100 %   06/07/18 2332 98 °F (36.7 °C) 74 16 (!) 143/97 99 %   06/07/18 1945 97.4 °F (36.3 °C) 85 16 138/64 99 %   06/07/18 1825 97.8 °F (36.6 °C) 79 18 175/82 100 %   06/07/18 1730 - - - 172/56 94 %   06/07/18 1715 - - - (!) 182/91 (!) 86 %   06/07/18 1700 - - - 178/70 99 %   06/07/18 1645 - - - 158/79 97 %   06/07/18 1630 - - - 160/76 100 %   06/07/18 1615 - - - 178/82 95 %   06/07/18 1600 - - - 157/76 100 %   06/07/18 1545 - - - (!) 167/14 94 %   06/07/18 1530 - - - 155/74 99 %   06/07/18 1500 - - - 177/75 100 %   06/07/18 1445 - - - 150/83 100 %   06/07/18 1349 98.2 °F (36.8 °C) 75 16 (!) 148/106 99 %       Intake/Output Summary (Last 24 hours) at 06/08/18 0832  Last data filed at 06/08/18 0516   Gross per 24 hour   Intake            181.3 ml   Output              650 ml   Net           -468.7 ml        PHYSICAL EXAM:  General: WD, WN. Alert, cooperative, no acute distress    EENT:  EOMI. Anicteric sclerae. MMM  Resp:  CTA bilaterally, no wheezing or rales.   No accessory muscle use  CV:  Regular  rhythm,  No edema  GI:  Soft, Non distended, + generalized tender, + guardian.  +Bowel sounds                           + slight erythema around old PEG site   Neurologic:  Alert and oriented X 3, normal speech,   Psych:   Good insight. Not anxious nor agitated  Skin:  No rashes. No jaundice    Reviewed most current lab test results and cultures  YES  Reviewed most current radiology test results   YES  Review and summation of old records today    NO  Reviewed patient's current orders and MAR    YES  PMH/SH reviewed - no change compared to H&P  ________________________________________________________________________  Care Plan discussed with:    Comments   Patient y    Family      RN y    Care Manager     Consultant                        Multidiciplinary team rounds were held today with , nursing, pharmacist and clinical coordinator. Patient's plan of care was discussed; medications were reviewed and discharge planning was addressed. ________________________________________________________________________  Total NON critical care TIME:  35  Minutes    Total CRITICAL CARE TIME Spent:   Minutes non procedure based      Comments   >50% of visit spent in counseling and coordination of care     ________________________________________________________________________  Kaiser Irvin MD     Procedures: see electronic medical records for all procedures/Xrays and details which were not copied into this note but were reviewed prior to creation of Plan. LABS:  I reviewed today's most current labs and imaging studies.   Pertinent labs include:  Recent Labs      06/08/18   0445  06/07/18   1503  06/07/18   0814   WBC   --   7.7  9.2   HGB  6.9*  6.5*  7.2*   HCT  22.4*  21.3*  23.5*   PLT   --   511*  573*     Recent Labs      06/08/18   0445  06/07/18   0814   NA  134*  128*   K  3.9  4.5   CL  101  92*   CO2  26  25   GLU  76  90   BUN  6  5*   CREA  0.41*  0.58*   CA  8.5  9.2   MG  1.6  1.7   PHOS  3.6   -- ALB  2.5*  3.3*   TBILI  0.5  0.2   SGOT  9*  14*   ALT  10*  16       Signed: Tamiko Balbuena MD

## 2018-06-08 NOTE — PROCEDURES
NAME:  Kaitlyn Kent   :   1960   MRN:   [id number]     Date/Time:  2018 8:25 AM    Esophagogastroduodenoscopy (EGD) Procedure Note    Procedure: Esophagogastroduodenoscopy with biopsy    Indication: melena, anemia, abnormal CT  Pre-operative Diagnosis: see indication above  Post-operative Diagnosis: see findings below  :  Katie Peace MD  Referring Provider:   -Marisol Basilio MD    Exam:  Airway: clear, no airway problems anticipated  Heart: RRR, without gallops or rubs  Lungs: clear bilaterally without wheezes, crackles, or rhonchi  Abdomen: soft, nontender, nondistended, bowel sounds present  Mental Status: awake, alert and oriented to person, place and time     Anethesia/Sedation:  MAC anesthesia Propofol  Procedure Details   After informed consent was obtained for the procedure, with all risks and benefits of procedure explained the patient was taken to the endoscopy suite and placed in the left lateral decubitus position. Following sequential administration of sedation as per above, the VTIS005 gastroscope was inserted into the mouth and advanced under direct vision to second portion of the duodenum. A careful inspection was made as the gastroscope was withdrawn, including a retroflexed view of the proximal stomach; findings and interventions are described below. Findings:   OROPHARYNX: Cords and pyriform recesses normal.   ESOPHAGUS: The esophagus is normal. The proximal, mid, and distal portions are normal. The Z-Line is intact. STOMACH:   -- 4-5 cm in diameter, deep, cavernous antral ulcer along the lesser curvature and anterior portion of the stomach. Well-demarcated, edematous edges. Multiple biopsies obtained. No high risk stigmata for bleeding. No active bleeding.   -- remainder of mucosa normal.  DUODENUM: The bulb and second portions are normal.    Therapies:   biopsy of stomach antrum    Specimens: gastric ulcer    EBL:  None.          Complications:   None; patient tolerated the procedure well. Impression:  -- huge, deep antral ulcer, biopsied  -- otherwise, normal    Recommendations:  -- PPI BID  -- carafate QID  -- repeat EGD in 8 weeks  -- await pathology, this may be a malignant ulcer.    -- given the size and depth of this ulcer, may need to consider surgery if fails to heal.    Discharge disposition:  Back to wards    Sasha Corbin MD

## 2018-06-08 NOTE — CDMP QUERY
Dr. Caro Billingsley :  Patient is noted to have a BMI of 15.69. Please clarify if this patient is:     =>Underweight  =>Cachexia  =>Failure to Thrive  =>Other explanation of clinical findings  =>Clinically Undetermined (no explanation for clinical findings)    Presentation: 5'9.5\", 107 lbs 12.9 oz= BMI 15.69    Please clarify and document your clinical opinion in the progress notes and discharge summary, including the definitive and or presumptive diagnosis, (suspected or probable), related to the above clinical findings. Please include clinical findings supporting your diagnosis.      Thank Natalya Yanes  [phone]

## 2018-06-08 NOTE — PROGRESS NOTES
Anesthesia reports 110mg Propofol, 40mg Lidocaine 0.1mcg Robinul and 300mL NS given during procedure. Received report from anesthesia staff on vital signs and status of patient.

## 2018-06-08 NOTE — PERIOP NOTES
[facility]  1960  [id number]    Situation:  Verbal report received from: Taina Martinez RN  Procedure: Procedure(s):  ESOPHAGOGASTRODUODENOSCOPY (EGD)  ESOPHAGOGASTRODUODENAL (EGD) BIOPSY    Background:    Preoperative diagnosis: Melena [K92.1]  Postoperative diagnosis: Gastric ulcer    :  Dr. Shira Ramos  Assistant(s): Endoscopy Technician-1: Nallely Spaulding  Endoscopy RN-1: Jaci Gaitan RN  Endoscopy RN-2: Meena Ojeda RN    Specimens:   ID Type Source Tests Collected by Time Destination   1 : Gastric ulcer bx Preservative Gastric  Hanna Shah MD 6/8/2018 5706 Pathology     H. Pylori  no    Assessment:  Intra-procedure medications   Anesthesia gave intra-procedure sedation and medications, see anesthesia flow sheet yes    Intravenous fluids: NS@ KVO     Vital signs stable     Abdominal assessment: round and soft     Recommendation:  Return to floor
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
## 2018-06-08 NOTE — H&P
Date of Surgery Update:  Haven Spurling was seen and examined. History and physical has been reviewed. The patient has been examined.  There have been no significant clinical changes since the completion of the originally dated History and Physical.    Signed By: Griffin Kelsey MD     June 8, 2018 8:25 AM

## 2018-06-08 NOTE — ANESTHESIA POSTPROCEDURE EVALUATION
Post-Anesthesia Evaluation and Assessment    Patient: Stephanie Capone MRN: [id number]  SSN: [id number]    YOB: 1960  Age: 62 y.o. Sex: male       Cardiovascular Function/Vital Signs  Visit Vitals    /53    Pulse 74    Temp 36.6 °C (97.8 °F)    Resp 10    SpO2 100%       Patient is status post total IV anesthesia anesthesia for Procedure(s):  ESOPHAGOGASTRODUODENOSCOPY (EGD)  ESOPHAGOGASTRODUODENAL (EGD) BIOPSY. Nausea/Vomiting: None    Postoperative hydration reviewed and adequate. Pain:  Pain Scale 1: Numeric (0 - 10) (06/08/18 0756)  Pain Intensity 1: 7 (06/08/18 0756)   Managed    Neurological Status: At baseline    Mental Status and Level of Consciousness: Arousable    Pulmonary Status:   O2 Device: CO2 nasal cannula (06/08/18 0839)   Adequate oxygenation and airway patent    Complications related to anesthesia: None    Post-anesthesia assessment completed.  No concerns    Signed By: Jamel Rahman MD     June 8, 2018

## 2018-06-08 NOTE — PROGRESS NOTES
Problem: Falls - Risk of  Goal: *Absence of Falls  Document Jayden Fall Risk and appropriate interventions in the flowsheet.    Outcome: Progressing Towards Goal  Fall Risk Interventions:  Mobility Interventions: Patient to call before getting OOB         Medication Interventions: Patient to call before getting OOB

## 2018-06-08 NOTE — ANESTHESIA PREPROCEDURE EVALUATION
Anesthetic History   No history of anesthetic complications            Review of Systems / Medical History  Patient summary reviewed, nursing notes reviewed and pertinent labs reviewed    Pulmonary    COPD      Smoker      Comments: Hx Lung ca  Current Every Day Smoker   Neuro/Psych         Headaches     Cardiovascular    Hypertension              Exercise tolerance: >4 METS     GI/Hepatic/Renal           PUD    Comments: Melena  Endo/Other        Anemia     Other Findings   Comments:  Hx bladder ca  Hx base of tongue ca  Alcohol abuse  S/p trach         Physical Exam    Airway  Mallampati: I    Neck ROM: normal range of motion   Mouth opening: Normal     Cardiovascular  Regular rate and rhythm,  S1 and S2 normal,  no murmur, click, rub, or gallop             Dental    Dentition: Edentulous     Pulmonary  Breath sounds clear to auscultation               Abdominal  GI exam deferred       Other Findings            Anesthetic Plan    ASA: 3  Anesthesia type: total IV anesthesia          Induction: Intravenous  Anesthetic plan and risks discussed with: Patient

## 2018-06-08 NOTE — PERIOP NOTES
TRANSFER - IN REPORT:    Verbal report received from Margot(name) on William Bryson  being received from 2176(unit) for ordered procedure      Report consisted of patients Situation, Background, Assessment and   Recommendations(SBAR). Information from the following report(s) SBAR was reviewed with the receiving nurse. Opportunity for questions and clarification was provided. Assessment completed upon patients arrival to unit and care assumed.
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
## 2018-06-08 NOTE — PROGRESS NOTES
Reason for Admission:  GI bleed                   RRAT Score:          11           Plan for utilizing home health:    Patient is independent with ADL's and IADL's but does not drive. Goes out as little as possible. Has had HH in past [facility]) and was not a good experience. Pt does not anticipate any needs for discharge. DME at home is a rolling walker and a cane. Has not needed SNF/Rehab in past. CM will continue to follow for discharge needs and recommendations. Likelihood of Readmission:  low                         Transition of Care Plan:             Patient is a 62year old  male. CM met patient and introduced self and role. Pt is alert/oriented and in no acute distress. Demographics verified. Pt has PO box but street address is [facility] in [facility]. Preferred pharmacy is the [facility] in [address] Care Management Interventions  PCP Verified by CM: Yes (sees Dr. Lizzy Duran)  Mode of Transport at Discharge:  (brother will  and transport to home)  Transition of Care Consult (CM Consult): Discharge Planning  Discharge Durable Medical Equipment: No (has rolling walker and cane at home and wife has a scooter)  Physical Therapy Consult: Yes  Occupational Therapy Consult: Yes  Speech Therapy Consult: No  Current Support Network: Own Home, Lives with Spouse (lives in a 1 story home with wife with ramp into entrance)  Confirm Follow Up Transport: Family (brother will transport to follow up appt as needed.)  Plan discussed with Pt/Family/Caregiver:  Yes    Priscila Chatterjee, RN  [phone]

## 2018-06-09 LAB
ABO + RH BLD: NORMAL
ANION GAP SERPL CALC-SCNC: 7 MMOL/L (ref 5–15)
BLD PROD TYP BPU: NORMAL
BLD PROD TYP BPU: NORMAL
BLOOD GROUP ANTIBODIES SERPL: NORMAL
BPU ID: NORMAL
BPU ID: NORMAL
BUN SERPL-MCNC: 5 MG/DL (ref 6–20)
BUN/CREAT SERPL: 10 (ref 12–20)
CALCIUM SERPL-MCNC: 8.8 MG/DL (ref 8.5–10.1)
CHLORIDE SERPL-SCNC: 100 MMOL/L (ref 97–108)
CO2 SERPL-SCNC: 28 MMOL/L (ref 21–32)
CREAT SERPL-MCNC: 0.51 MG/DL (ref 0.7–1.3)
CROSSMATCH RESULT,%XM: NORMAL
CROSSMATCH RESULT,%XM: NORMAL
GLUCOSE SERPL-MCNC: 87 MG/DL (ref 65–100)
HCT VFR BLD AUTO: 30.5 % (ref 36.6–50.3)
HGB BLD-MCNC: 9.6 G/DL (ref 12.1–17)
POTASSIUM SERPL-SCNC: 3.9 MMOL/L (ref 3.5–5.1)
SODIUM SERPL-SCNC: 135 MMOL/L (ref 136–145)
SPECIMEN EXP DATE BLD: NORMAL
STATUS OF UNIT,%ST: NORMAL
STATUS OF UNIT,%ST: NORMAL
UNIT DIVISION, %UDIV: 0
UNIT DIVISION, %UDIV: 0

## 2018-06-09 PROCEDURE — 74011250637 HC RX REV CODE- 250/637: Performed by: INTERNAL MEDICINE

## 2018-06-09 PROCEDURE — 85018 HEMOGLOBIN: CPT | Performed by: HOSPITALIST

## 2018-06-09 PROCEDURE — 74011250637 HC RX REV CODE- 250/637: Performed by: HOSPITALIST

## 2018-06-09 PROCEDURE — 74011000250 HC RX REV CODE- 250: Performed by: HOSPITALIST

## 2018-06-09 PROCEDURE — 65660000000 HC RM CCU STEPDOWN

## 2018-06-09 PROCEDURE — 36415 COLL VENOUS BLD VENIPUNCTURE: CPT | Performed by: HOSPITALIST

## 2018-06-09 PROCEDURE — 80048 BASIC METABOLIC PNL TOTAL CA: CPT | Performed by: HOSPITALIST

## 2018-06-09 PROCEDURE — 74011250636 HC RX REV CODE- 250/636: Performed by: HOSPITALIST

## 2018-06-09 RX ORDER — AMLODIPINE BESYLATE 5 MG/1
5 TABLET ORAL DAILY
Status: DISCONTINUED | OUTPATIENT
Start: 2018-06-09 | End: 2018-06-10

## 2018-06-09 RX ADMIN — SUCRALFATE 1 G: 1 SUSPENSION ORAL at 22:01

## 2018-06-09 RX ADMIN — SUCRALFATE 1 G: 1 SUSPENSION ORAL at 18:04

## 2018-06-09 RX ADMIN — DOCUSATE SODIUM 100 MG: 100 CAPSULE, LIQUID FILLED ORAL at 18:03

## 2018-06-09 RX ADMIN — Medication 10 ML: at 14:54

## 2018-06-09 RX ADMIN — SUCRALFATE 1 G: 1 SUSPENSION ORAL at 09:31

## 2018-06-09 RX ADMIN — FOLIC ACID: 5 INJECTION, SOLUTION INTRAMUSCULAR; INTRAVENOUS; SUBCUTANEOUS at 00:55

## 2018-06-09 RX ADMIN — PANTOPRAZOLE SODIUM 40 MG: 40 TABLET, DELAYED RELEASE ORAL at 18:04

## 2018-06-09 RX ADMIN — MORPHINE SULFATE 1 MG: 4 INJECTION INTRAVENOUS at 14:54

## 2018-06-09 RX ADMIN — CEPHALEXIN 500 MG: 125 FOR SUSPENSION ORAL at 06:24

## 2018-06-09 RX ADMIN — Medication 10 ML: at 22:01

## 2018-06-09 RX ADMIN — PANTOPRAZOLE SODIUM 40 MG: 40 TABLET, DELAYED RELEASE ORAL at 09:30

## 2018-06-09 RX ADMIN — Medication 10 ML: at 06:24

## 2018-06-09 RX ADMIN — DOCUSATE SODIUM 100 MG: 100 CAPSULE, LIQUID FILLED ORAL at 09:30

## 2018-06-09 RX ADMIN — MORPHINE SULFATE 1 MG: 4 INJECTION INTRAVENOUS at 09:31

## 2018-06-09 RX ADMIN — MORPHINE SULFATE 1 MG: 4 INJECTION INTRAVENOUS at 00:43

## 2018-06-09 RX ADMIN — CEPHALEXIN 500 MG: 125 FOR SUSPENSION ORAL at 13:05

## 2018-06-09 RX ADMIN — AMLODIPINE BESYLATE 5 MG: 5 TABLET ORAL at 14:54

## 2018-06-09 RX ADMIN — HYDRALAZINE HYDROCHLORIDE 10 MG: 20 INJECTION INTRAMUSCULAR; INTRAVENOUS at 18:32

## 2018-06-09 RX ADMIN — CEPHALEXIN 500 MG: 125 FOR SUSPENSION ORAL at 18:02

## 2018-06-09 RX ADMIN — MORPHINE SULFATE 1 MG: 4 INJECTION INTRAVENOUS at 19:53

## 2018-06-09 RX ADMIN — SUCRALFATE 1 G: 1 SUSPENSION ORAL at 13:05

## 2018-06-09 NOTE — PROGRESS NOTES
Problem: Falls - Risk of  Goal: *Absence of Falls  Document Jayden Fall Risk and appropriate interventions in the flowsheet. Outcome: Progressing Towards Goal  Fall Risk Interventions:  Mobility Interventions: Patient to call before getting OOB         Medication Interventions: Patient to call before getting OOB                  Problem: Pressure Injury - Risk of  Goal: *Prevention of pressure injury  Document Bryan Scale and appropriate interventions in the flowsheet.    Outcome: Progressing Towards Goal  Pressure Injury Interventions:       Moisture Interventions: Maintain skin hydration (lotion/cream)    Activity Interventions: Increase time out of bed    Mobility Interventions: HOB 30 degrees or less    Nutrition Interventions: Document food/fluid/supplement intake    Friction and Shear Interventions: HOB 30 degrees or less

## 2018-06-09 NOTE — PROGRESS NOTES
Hospitalist Progress Note    NAME: Kyleigh Daniel   :  1960   MRN:  [id number]       Assessment / Plan:  GI bleed with heme positive stool, POA  Acute on chronic iron deficiency anemia, POA due to GI bleed  Large gastric ulcer 4cm with circumferential thickening of distal stomach, antrum and pylorus, POA. Need to rule out malignancy  Abdominal pain due to above  Thickening of overlying left rectus muscle  Mild cellulitis of abdominal wall  - Hb 7.2 on admission --> 9.6 , follow in am   S/p 2 PRBC   -No NSAIDs   --IV PPI bid and carafate   --EGD:4-5 cm in diameter, deep, cavernous antral ulcer along the lesser curvature and anterior portion of the stomach. Well-demarcated, edematous edges. Multiple biopsies obtained. No high risk stigmata for bleeding. No active bleeding  --Follow pathology   --keflex x 5 days, erythema is getting better   --morphine liquid for pain  --CT: Diffuse gastric wall thickening with large antral ulcer. Infectious/inflammatory  process is considered most likely. Underlying neoplasm cannot entirely excluded.     Hyponatremia   - Na 128--> 135  --suspect due to dehydration since improving with IVF   --IVF with goody bag and NS.     Elevated blood pressures / new Dx HTN   --BP remain high --> start Norvasc   -IV hydralazine prn     Alcohol abuse  Hx DT and seizure  --10-12 beers a week, has cut down. --no signs of withdrawing at this time   --daily good bag x 3 doses . Ativan IV prn CIWA protocol.     Hx throat cancer s/p XRT and chemotx with cisplatin. --last note from Dr. Stacey Sky 2015 indicated probably metastatic disease to lung base on PET CT scan and bone scan. Lost to follow up. PCP's note 2016 mentioned he did not want to go through chemo treatment anymore. --port was removed 12/15 due to infection. --D/w pt today. He is not interesting in any further work up for his cancer. He does not want any treatment for cancer.  Depending on pathology result may consider referral to hospice      Tobacco abuse, nicotine patch  Moderate to severe emphysema, duoneb prn  Hx bladder CA s/p TURP  Underweight.        Code: DNR/DNI. DVT prophylaxis:  SCD  Surrogate decision maker:  wife    Baseline: lives with wife, ambulating with walker   Recommended Disposition: Home w/Family in am if h/h remain stable      Subjective:     Chief Complaint / Reason for Physician Visit: following GI bleeding / anemia / hyponatremia   + abdominal pain - improving  Tolerating regular diet     Discussed with RN events overnight. Review of Systems:  Symptom Y/N Comments  Symptom Y/N Comments   Fever/Chills n   Chest Pain     Poor Appetite    Edema     Cough    Abdominal Pain y    Sputum    Joint Pain     SOB/ARROYO n   Pruritis/Rash     Nausea/vomit n   Tolerating PT/OT     Diarrhea    Tolerating Diet     Constipation    Other       Could NOT obtain due to:      Objective:     VITALS:   Last 24hrs VS reviewed since prior progress note. Most recent are:  Patient Vitals for the past 24 hrs:   Temp Pulse Resp BP SpO2   06/09/18 0724 97.9 °F (36.6 °C) 85 16 (!) 194/97 100 %   06/09/18 0354 98.3 °F (36.8 °C) 82 16 (!) 186/101 99 %   06/09/18 0008 98.3 °F (36.8 °C) 78 16 171/82 99 %   06/08/18 2026 98 °F (36.7 °C) 76 - (!) 157/91 -   06/08/18 2016 98 °F (36.7 °C) 82 16 (!) 191/112 98 %   06/08/18 1830 - 88 18 163/66 98 %   06/08/18 1730 - 78 16 (!) 151/97 100 %   06/08/18 1700 98.2 °F (36.8 °C) 80 18 (!) 145/94 99 %   06/08/18 1643 - 72 18 141/89 100 %   06/08/18 1630 - 81 18 142/88 100 %   06/08/18 1616 - 76 20 146/89 98 %   06/08/18 1500 97.3 °F (36.3 °C) 74 21 118/76 100 %       Intake/Output Summary (Last 24 hours) at 06/09/18 1251  Last data filed at 06/09/18 1024   Gross per 24 hour   Intake          3424.16 ml   Output             1875 ml   Net          1549.16 ml        PHYSICAL EXAM:  General: WD, WN. Alert, cooperative, no acute distress    EENT:  EOMI. Anicteric sclerae.  MMM  Resp:  CTA bilaterally, no wheezing or rales. No accessory muscle use  CV:  Regular  rhythm,  No edema  GI:  Soft, Non distended, + less tender, no guardian.  +Bowel sounds                           + erythema around old PEG site - better   Neurologic:  Alert and oriented X 3, normal speech,   Psych:   Good insight. Not anxious nor agitated  Skin:  No rashes. No jaundice    Reviewed most current lab test results and cultures  YES  Reviewed most current radiology test results   YES  Review and summation of old records today    NO  Reviewed patient's current orders and MAR    YES  PMH/SH reviewed - no change compared to H&P  ________________________________________________________________________  Care Plan discussed with:    Comments   Patient y    Family      RN y    Care Manager     Consultant                        Multidiciplinary team rounds were held today with , nursing, pharmacist and clinical coordinator. Patient's plan of care was discussed; medications were reviewed and discharge planning was addressed. ________________________________________________________________________  Total NON critical care TIME:  35  Minutes    Total CRITICAL CARE TIME Spent:   Minutes non procedure based      Comments   >50% of visit spent in counseling and coordination of care     ________________________________________________________________________  Ancelmo Matos MD     Procedures: see electronic medical records for all procedures/Xrays and details which were not copied into this note but were reviewed prior to creation of Plan. LABS:  I reviewed today's most current labs and imaging studies.   Pertinent labs include:  Recent Labs      06/09/18   0921  06/08/18   0445  06/07/18   1503  06/07/18 0814   WBC   --    --   7.7  9.2   HGB  9.6*  6.9*  6.5*  7.2*   HCT  30.5*  22.4*  21.3*  23.5*   PLT   --    --   511*  573*     Recent Labs      06/09/18   0921  06/08/18   0445  06/07/18 0814   NA  135*  134*  128*   K 3.9  3.9  4.5   CL  100  101  92*   CO2  28  26  25   GLU  87  76  90   BUN  5*  6  5*   CREA  0.51*  0.41*  0.58*   CA  8.8  8.5  9.2   MG   --   1.6  1.7   PHOS   --   3.6   --    ALB   --   2.5*  3.3*   TBILI   --   0.5  0.2   SGOT   --   9*  14*   ALT   --   10*  16       Signed: Lyndsey Zepeda MD

## 2018-06-09 NOTE — ROUTINE PROCESS
General Surgery End of Shift Nursing Note    Bedside shift change report given to Yahaira (oncoming nurse) by Scooter Lau (offgoing nurse). Report included the following information SBAR, Kardex, ED Summary, Procedure Summary, Intake/Output, MAR and Recent Results. Shift worked:   7-730   Summary of shift:    Pt c/o pain x3 in abdomen; managed well with Morphine IV. Up to chair with miniaml assist and walker. Tolerated all meals. BP elevated thru shift. Pt states he doesn't check BP/ nor take any BP meds at home. Norvasc added to scheduled meds. Issues for physician to address:   none     Number times ambulated in hallway past shift: 0    Number of times OOB to chair past shift: 1    Pain Management:  Current medication: see MAR  Patient states pain is manageable on current pain medication: YES    GI:    Current diet:  DIET NUTRITIONAL SUPPLEMENTS Yes; AM Snack; Ensure Pudding  DIET REGULAR    Tolerating current diet: YES  Passing flatus: YES  Last Bowel Movement: yesterday   Appearance: not observed    Respiratory:    Incentive Spirometer at bedside: YES  Patient instructed on use: NO    Patient Safety:    Falls Score: 3  Bed Alarm On? No  Sitter?  No    Kerrie Han

## 2018-06-09 NOTE — PROGRESS NOTES
Spiritual Care Partner Volunteer visited patient in Gen Surg on 6/9/18. Documented by:  Julia Szymanski M.Div.    Paging Service [phone])

## 2018-06-10 VITALS
BODY MASS INDEX: 16.56 KG/M2 | SYSTOLIC BLOOD PRESSURE: 142 MMHG | DIASTOLIC BLOOD PRESSURE: 84 MMHG | HEART RATE: 94 BPM | OXYGEN SATURATION: 99 % | TEMPERATURE: 98.1 F | RESPIRATION RATE: 18 BRPM | WEIGHT: 113.76 LBS

## 2018-06-10 LAB
ANION GAP SERPL CALC-SCNC: 7 MMOL/L (ref 5–15)
BUN SERPL-MCNC: 6 MG/DL (ref 6–20)
BUN/CREAT SERPL: 13 (ref 12–20)
CALCIUM SERPL-MCNC: 8.8 MG/DL (ref 8.5–10.1)
CHLORIDE SERPL-SCNC: 100 MMOL/L (ref 97–108)
CO2 SERPL-SCNC: 27 MMOL/L (ref 21–32)
CREAT SERPL-MCNC: 0.47 MG/DL (ref 0.7–1.3)
GLUCOSE SERPL-MCNC: 97 MG/DL (ref 65–100)
HCT VFR BLD AUTO: 28.5 % (ref 36.6–50.3)
HGB BLD-MCNC: 8.9 G/DL (ref 12.1–17)
POTASSIUM SERPL-SCNC: 3.7 MMOL/L (ref 3.5–5.1)
SODIUM SERPL-SCNC: 134 MMOL/L (ref 136–145)

## 2018-06-10 PROCEDURE — 85018 HEMOGLOBIN: CPT | Performed by: HOSPITALIST

## 2018-06-10 PROCEDURE — 74011000250 HC RX REV CODE- 250: Performed by: HOSPITALIST

## 2018-06-10 PROCEDURE — 80048 BASIC METABOLIC PNL TOTAL CA: CPT | Performed by: HOSPITALIST

## 2018-06-10 PROCEDURE — 74011250637 HC RX REV CODE- 250/637: Performed by: HOSPITALIST

## 2018-06-10 PROCEDURE — 74011250636 HC RX REV CODE- 250/636: Performed by: HOSPITALIST

## 2018-06-10 PROCEDURE — 74011250637 HC RX REV CODE- 250/637: Performed by: INTERNAL MEDICINE

## 2018-06-10 PROCEDURE — 36415 COLL VENOUS BLD VENIPUNCTURE: CPT | Performed by: HOSPITALIST

## 2018-06-10 RX ORDER — CEPHALEXIN 125 MG/5ML
500 POWDER, FOR SUSPENSION ORAL 3 TIMES DAILY
Qty: 180 ML | Refills: 0 | Status: SHIPPED | OUTPATIENT
Start: 2018-06-10 | End: 2018-06-13

## 2018-06-10 RX ORDER — PANTOPRAZOLE SODIUM 40 MG/1
40 TABLET, DELAYED RELEASE ORAL
Qty: 60 TAB | Refills: 0 | Status: SHIPPED | OUTPATIENT
Start: 2018-06-10 | End: 2019-08-20

## 2018-06-10 RX ORDER — NALOXONE HYDROCHLORIDE 4 MG/.1ML
SPRAY NASAL
Qty: 1 EACH | Refills: 0 | Status: SHIPPED | OUTPATIENT
Start: 2018-06-10

## 2018-06-10 RX ORDER — AMLODIPINE BESYLATE 10 MG/1
10 TABLET ORAL DAILY
Qty: 30 TAB | Refills: 0 | Status: SHIPPED | OUTPATIENT
Start: 2018-06-11 | End: 2019-08-20

## 2018-06-10 RX ORDER — SUCRALFATE 1 G/10ML
1 SUSPENSION ORAL
Qty: 1200 ML | Refills: 0 | Status: SHIPPED | OUTPATIENT
Start: 2018-06-10 | End: 2018-07-10

## 2018-06-10 RX ORDER — OXYCODONE AND ACETAMINOPHEN 5; 325 MG/1; MG/1
1 TABLET ORAL
Qty: 20 TAB | Refills: 0 | Status: SHIPPED | OUTPATIENT
Start: 2018-06-10 | End: 2018-06-14 | Stop reason: SDUPTHER

## 2018-06-10 RX ORDER — AMLODIPINE BESYLATE 5 MG/1
10 TABLET ORAL DAILY
Status: DISCONTINUED | OUTPATIENT
Start: 2018-06-10 | End: 2018-06-10 | Stop reason: HOSPADM

## 2018-06-10 RX ADMIN — AMLODIPINE BESYLATE 10 MG: 5 TABLET ORAL at 08:25

## 2018-06-10 RX ADMIN — MORPHINE SULFATE 1 MG: 4 INJECTION INTRAVENOUS at 12:51

## 2018-06-10 RX ADMIN — PANTOPRAZOLE SODIUM 40 MG: 40 TABLET, DELAYED RELEASE ORAL at 08:25

## 2018-06-10 RX ADMIN — CEPHALEXIN 500 MG: 125 FOR SUSPENSION ORAL at 13:59

## 2018-06-10 RX ADMIN — SUCRALFATE 1 G: 1 SUSPENSION ORAL at 08:25

## 2018-06-10 RX ADMIN — Medication 10 ML: at 13:59

## 2018-06-10 RX ADMIN — SUCRALFATE 1 G: 1 SUSPENSION ORAL at 12:51

## 2018-06-10 RX ADMIN — FOLIC ACID: 5 INJECTION, SOLUTION INTRAMUSCULAR; INTRAVENOUS; SUBCUTANEOUS at 00:40

## 2018-06-10 RX ADMIN — DOCUSATE SODIUM 100 MG: 100 CAPSULE, LIQUID FILLED ORAL at 08:25

## 2018-06-10 RX ADMIN — CEPHALEXIN 500 MG: 125 FOR SUSPENSION ORAL at 06:25

## 2018-06-10 RX ADMIN — Medication 10 ML: at 06:30

## 2018-06-10 RX ADMIN — SUCRALFATE 1 G: 1 SUSPENSION ORAL at 16:34

## 2018-06-10 RX ADMIN — Medication 1 CAPSULE: at 08:25

## 2018-06-10 RX ADMIN — MORPHINE SULFATE 1 MG: 4 INJECTION INTRAVENOUS at 06:25

## 2018-06-10 RX ADMIN — CEPHALEXIN 500 MG: 125 FOR SUSPENSION ORAL at 00:48

## 2018-06-10 RX ADMIN — PANTOPRAZOLE SODIUM 40 MG: 40 TABLET, DELAYED RELEASE ORAL at 16:34

## 2018-06-10 NOTE — PROGRESS NOTES
Hospitalist Progress Note    NAME: Anderson Rosenbaum   :  1960   MRN:  [id number]       Assessment / Plan:  GI bleed with heme positive stool, POA  Acute on chronic iron deficiency anemia, POA due to GI bleed  Large gastric ulcer 4cm with circumferential thickening of distal stomach, antrum and pylorus, POA. Need to rule out malignancy  Abdominal pain due to above  Thickening of overlying left rectus muscle  Mild cellulitis of abdominal wall  - Hb 7.2 on admission --> 9.6 --> 8.9    S/p 2 PRBC   -No NSAIDs ( d/w pt and he verbalized understanding )   --PPI bid and carafate   --EGD:4-5 cm in diameter, deep, cavernous antral ulcer along the lesser curvature and anterior portion of the stomach. Well-demarcated, edematous edges. Multiple biopsies obtained. No high risk stigmata for bleeding. No active bleeding  --Follow pathology ( will be followed Op by Dr Concepcion Lennon)   --keflex x 5 days, erythema is getting better   --morphine liquid for pain ( d/w pt no driving when on morphine / possibility of suppression of respiratory drive and use of naloxone )   --CT: Diffuse gastric wall thickening with large antral ulcer. Infectious/inflammatory  process is considered most likely. Underlying neoplasm cannot entirely excluded.     Hyponatremia   - Na 128--> 135, 134   --suspect due to dehydration since improved with IVF   --follow with PCP      Elevated blood pressures / new Dx HTN   --BP remain high side --> started Norvasc this admission   --follow with PCP     Alcohol abuse  Hx DT and seizure  --10-12 beers a week, has cut down. --no signs of withdrawing while here      Hx throat cancer s/p XRT and chemotx with cisplatin. --last note from Dr. Esparza Grain 2015 indicated probably metastatic disease to lung base on PET CT scan and bone scan. Lost to follow up. PCP's note 2016 mentioned he did not want to go through chemo treatment anymore. --port was removed 12/15 due to infection.   --D/w pt: He is not interesting in any further work up for his cancer. He does not want any treatment for cancer.     Tobacco abuse, nicotine patch  Moderate to severe emphysema, duoneb prn  Hx bladder CA s/p TURP  Underweight.        Code: DNR/DNI. DVT prophylaxis:  SCD  Surrogate decision maker:  wife    Baseline: lives with wife, ambulating with walker   Recommended Disposition: Home w/Family today  D/w pt DC planning: discussed to wait pathology and if cancer to consider Hospice. Pt declined hospice consult at this time. He confirmed his wishes in regard of no more surgeries or treatment for cancer but in the same time he doesn't want hospice referral. He would like to go home today and follow with GI/PCP OP for pathology result. SW consult for DC planning, will try to arrange health care dispatch      Subjective:     Chief Complaint / Reason for Physician Visit: following GI bleeding / anemia / hyponatremia   + abdominal pain - continue to improve   Tolerating regular diet     Discussed with RN events overnight. Review of Systems:  Symptom Y/N Comments  Symptom Y/N Comments   Fever/Chills n   Chest Pain     Poor Appetite    Edema     Cough    Abdominal Pain y    Sputum    Joint Pain     SOB/ARROYO n   Pruritis/Rash     Nausea/vomit n   Tolerating PT/OT     Diarrhea    Tolerating Diet     Constipation    Other       Could NOT obtain due to:      Objective:     VITALS:   Last 24hrs VS reviewed since prior progress note.  Most recent are:  Patient Vitals for the past 24 hrs:   Temp Pulse Resp BP SpO2   06/10/18 0724 - 79 - (!) 170/97 -   06/10/18 0722 98.3 °F (36.8 °C) 80 16 (!) 180/102 100 %   06/10/18 0250 98.2 °F (36.8 °C) 88 17 154/90 99 %   06/09/18 2249 98.2 °F (36.8 °C) 87 21 (!) 156/93 98 %   06/09/18 1943 98.2 °F (36.8 °C) 92 19 152/85 100 %   06/09/18 1604 98.4 °F (36.9 °C) 94 16 (!) 191/109 100 %   06/09/18 1251 98.2 °F (36.8 °C) 88 16 (!) 188/115 100 %       Intake/Output Summary (Last 24 hours) at 06/10/18 1004  Last data filed at 06/10/18 0942   Gross per 24 hour   Intake              400 ml   Output             3250 ml   Net            -2850 ml        PHYSICAL EXAM:  General: WD, WN. Alert, cooperative, no acute distress    EENT:  EOMI. Anicteric sclerae. MMM  Resp:  CTA bilaterally, no wheezing or rales. No accessory muscle use  CV:  Regular  rhythm,  No edema  GI:  Soft, Non distended, + tender, no guardian.  +Bowel sounds                           + erythema around old PEG site - is much better   Neurologic:  Alert and oriented X 3, normal speech,   Psych:   Good insight. Not anxious nor agitated  Skin:  No rashes. No jaundice    Reviewed most current lab test results and cultures  YES  Reviewed most current radiology test results   YES  Review and summation of old records today    NO  Reviewed patient's current orders and MAR    YES  PMH/SH reviewed - no change compared to H&P  ________________________________________________________________________  Care Plan discussed with:    Comments   Patient y    Family      RN y    Care Manager     Consultant                        Multidiciplinary team rounds were held today with , nursing, pharmacist and clinical coordinator. Patient's plan of care was discussed; medications were reviewed and discharge planning was addressed. ________________________________________________________________________  Total NON critical care TIME:  35  Minutes    Total CRITICAL CARE TIME Spent:   Minutes non procedure based      Comments   >50% of visit spent in counseling and coordination of care y Dc coordination    ________________________________________________________________________  Scott Queen MD     Procedures: see electronic medical records for all procedures/Xrays and details which were not copied into this note but were reviewed prior to creation of Plan. LABS:  I reviewed today's most current labs and imaging studies.   Pertinent labs include:  Recent Labs 06/10/18   5339  06/09/18   0921  06/08/18 0445  06/07/18   1503   WBC   --    --    --   7.7   HGB  8.9*  9.6*  6.9*  6.5*   HCT  28.5*  30.5*  22.4*  21.3*   PLT   --    --    --   511*     Recent Labs      06/10/18   0637  06/09/18 0921 06/08/18   0445   NA  134*  135*  134*   K  3.7  3.9  3.9   CL  100  100  101   CO2  27  28  26   GLU  97  87  76   BUN  6  5*  6   CREA  0.47*  0.51*  0.41*   CA  8.8  8.8  8.5   MG   --    --   1.6   PHOS   --    --   3.6   ALB   --    --   2.5*   TBILI   --    --   0.5   SGOT   --    --   9*   ALT   --    --   10*       Signed: Ancelmo Matos MD

## 2018-06-10 NOTE — DISCHARGE INSTRUCTIONS
Patient Discharge Instructions    Chanel Berumen / [id number] : 1960    Admitted 2018 Discharged: 6/10/2018         DISCHARGE DIAGNOSIS:     Large gastric ulcer 4cm   -follow blood work to follow on bleeding   -call Dr Sindy Carvajal (GI) office next week to follow on pathology result. You will need another EGD in 8 weeks to follow on healing   -take protonix and carafate for stomach protection, discussed with Dr Sindy Carvajal for how long you have to take it     Mild cellulitis of abdominal wall  -complete antibiotic       Low sodium   -you will need blood work next week       High blood pressure   - your blood pressure was on the high side, so you were started on Norvasc         Take Home Medications     You are being discharge on antibiotic Keflex for treatment of around PEG cellulitis      What you should know about antibiotics:     Antibiotics are medicines that help people fight infections caused by bacteria. They work by killing bacteria that are in the body. Antibiotics can cause side effects, such as nausea, vomiting. Nausea is a common side effect of many antibiotics. It is not an allergic reaction. If you are a woman and you get a yeast infection after taking an antibiotic, that does not mean you are allergic to it. Yeast infections are a common side effect of antibiotics. One of the most important side effect to watch while taking antibiotic or after you just finished taking it is diarrhea. This type of diarrhea may be caused by an infection with bacteria called C. difficile. C. difficile normally lives in the intestines. When people are on antibiotics, the C. difficile in their intestines can overgrow and cause infection. If you develop any side effect especially diarrhea while taking antibiotics it is very important to contact your doctor. We recommend taking probiotics while taking antibiotics. Probiotics can be bought over the counter. Allergies to antibiotics are common.  You can develop an allergy to an antibiotic, even if you have not had a problem with it before. Symptoms of antibiotic allergy can be mild and include a flat rash and itching. They can also be more serious and include:      -----  Hives - are raised, red patches of skin that are usually very itchy      -----  Lip or tongue swelling     ------ Trouble swallowing or breathing  Serious allergy symptoms can start right after you start taking an antibiotic if you are very sensitive. Less serious symptoms, on the other hand, often start a day or more later. If you think you are allergic to antibiotics tell your doctor. General drug facts     If you have a very bad allergy, wear an allergy ID at all times. It is important that you take the medication exactly as they are prescribed. Keep your medication in the bottles provided by the pharmacist.  Keep a list of all your drugs (prescription, natural products, vitamins, OTC) with you. Give this list to your doctor. Do not take other medications without consulting your doctor. Do not share your drugs with others and do not take anyone else's drugs. Keep all drugs out of the reach of children and pets. Most drugs may be thrown away in household trash after mixing with coffee grounds or rocco litter and sealing in a plastic bag. Keep a list Call your doctor for help with any side effects. If in the [place] you may also call the FDA at 8-244-FDA-0720    Talk with the doctor before starting any new drug, including OTC, natural products, or vitamins. What to do at Home    1. Recommended diet: soft diet     2. Recommended activity: Activity as tolerated and No driving while on analgesics    3. If you experience any of the following symptoms then please call your primary care physician or return to the emergency room if you cannot get hold of your doctor: worsening abdominal pain ; nausea/ vomiting; bleeding     4. Lab work: you will need blood work in 2-3 days     6. Stop smocking and avoid alcohol     7. Bring these papers with you to your follow up appointments. The papers will help your doctors be sure to continue the care plan from the hospital.      Follow-up with:   PCP: Bettina Butler MD  Follow-up Information     Follow up With Details Comments [facility] MD Tiki In 1 week  79 Roscoe Chang MD In 2 weeks call office in 2 days to follow on pathology result  [address]  [phone]             Please call for your own appointment        Information obtained by :  I understand that if any problems occur once I am at home I am to contact my physician. I understand and acknowledge receipt of the instructions indicated above.                                                                                                                                            Physician's or R.N.'s Signature                                                                  Date/Time                                                                                                                                              Patient or Representative Signature                                                          Date/Time

## 2018-06-10 NOTE — PROGRESS NOTES
CM acknowledged discharge orders and consult placed to have dispatch health appointment setup for CBC/BMP check on 6/12 or 6/13. Pt lives in [place], [place] out of [address] CM informed MD, will arranged SN services. Referral sent to [facility] via [employer], awaiting acceptance. 12:51PM UPDATE  CM called Brinda Tam and left VM for on-call nurse to call back to follow-up with referral sent. CM informed pt will need transport at discharge, as his brother is now unavailable to transport him home. CM to call [facility] ([phone]) once [facility] is confirmed, awaiting call back. 2:00PM UPDATE  CM called [facility] [employer] [phone] to arranged transport home for pt. Unable to provide exact transport time - will arrive between 2:30-5:30PM for transport home (Confirmation #[id number]). CM informed that [facility] is unable to accept pt due to out of network with insurance. Referrals sent to [facility], [facility], and [facility]. If [facility] agency is unable to provide services, pt will need to get CBC/BMP done at PCP office. 3:45PM UPDATE  CM called [facility] [employer] to follow-up on estimated transport time. CM informed  dispatched at 3:10PM and should be at [address] to transport pt at approx. 4:10PM. CM updated nursing. CM will follow-up with [facility] referrals tomorrow morning, as have not been contacted by on-call staff yet at this time. 4:10PM [place] [phone] called CM to inform that they are out of pt's service area but that [place] office may be able to provide services. CM called [place] office ([phone]) and left VM with on-call staff to call back.      Whit Andrew, MSW Supervisee in Social Work, [address] [place] [address]  [phone]

## 2018-06-10 NOTE — PROGRESS NOTES
I have reviewed discharge instructions with the patient. The patient verbalized understanding. IV removed. VSS. Questions answered. Prescriptions given to pt. Pt was escorted via wheelchair to the front to the transportation service.

## 2018-06-10 NOTE — DISCHARGE SUMMARY
Hospitalist Discharge Summary     Patient ID:  Kelly Levy  [id number]  62 y.o.  1960    PCP on record: Yobany Fisher MD    Admit date: 6/7/2018  Discharge date and time: 6/10/2018      DISCHARGE DIAGNOSIS:    GI bleed with heme positive stool, POA  Acute on chronic iron deficiency anemia, POA due to GI bleed  Large gastric ulcer 4cm with circumferential thickening of distal stomach, antrum and pylorus, POA  Abdominal pain due to above  Thickening of overlying left rectus muscle  Mild cellulitis of abdominal wall  Hyponatremia   Elevated blood pressures / new Dx HTN   Alcohol abuse  Hx DT and seizure  Hx throat cancer s/p XRT and chemotx with cisplatin.    Tobacco abuse  Moderate to severe emphysema  Hx bladder CA s/p TURP  Underweight  Code: DNR/DNI        CONSULTATIONS:  IP CONSULT TO GASTROENTEROLOGY    Excerpted HPI from H&P of Mike Borrego MD:    Kelly Levy is a 62 y.o.  male with PMH significant for squamous cell throat CA, hx XRT and cisplatin chemotherapy, hx of bladder CA s/p resection, chronic smoker, moderate to severe COPD by CT 2015 who is transferred from [address] [place] [address] for further evaluation of large gastric ulcer seen on CT. Patient with 3 weeks of severe burning abdominal pain 10/10, , loss of appetite, weight loss 20-30#. Denies nausea, vomiting, diarrhea, constipation, bloody or black stool. Been taking naproxen tid for abdominal pain. Also noticed some redness near scar of old PEG tube site. No hx PUD. Never had colonoscopy.     PET CT and bone scan 12/2015 suggested metastatic disease to lung from throat cancer. He was lost to follow up after had port removed from left chest due to infection.     We were asked to admit for work up and evaluation of the above problems. ______________________________________________________________________  DISCHARGE SUMMARY/HOSPITAL COURSE:  for full details see H&P, daily progress notes, labs, consult notes.      GI bleed with heme positive stool, POA  Acute on chronic iron deficiency anemia, POA due to GI bleed  Large gastric ulcer 4cm with circumferential thickening of distal stomach, antrum and pylorus, POA.  Need to rule out malignancy  Abdominal pain due to above  Thickening of overlying left rectus muscle  Mild cellulitis of abdominal wall  - Hb 7.2 on admission --> 9.6 --> 8.9    S/p 2 PRBC   -No NSAIDs ( d/w pt and he verbalized understanding )   --PPI bid and carafate   --EGD:4-5 cm in diameter, deep, cavernous antral ulcer along the lesser curvature and anterior portion of the stomach. Well-demarcated, edematous edges. Multiple biopsies obtained. No high risk stigmata for bleeding. No active bleeding  --Follow pathology ( will be followed Op by Dr Endy Alicea)   --keflex x 5 days, erythema is getting better   --DC on Percocet prn ( d/w pt no driving when on morphine / possibility of suppression of respiratory drive and use of naloxone )   --CT: Diffuse gastric wall thickening with large antral ulcer. Infectious/inflammatory  process is considered most likely. Underlying neoplasm cannot entirely excluded.      Hyponatremia   - Na 128--> 135, 134   --suspect due to dehydration since improved with IVF   --follow with PCP       Elevated blood pressures / new Dx HTN   --BP remain high side --> started Norvasc this admission   --follow with PCP      Alcohol abuse  Hx DT and seizure  --10-12 beers a week, has cut down. --no signs of withdrawing while here       Hx throat cancer s/p XRT and chemotx with cisplatin.    --last note from Dr. Shala Jimenez 12/2015 indicated probably metastatic disease to lung base on PET CT scan and bone scan.  Lost to follow up.  PCP's note 1/2016 mentioned he did not want to go through chemo treatment anymore. --port was removed 12/15 due to infection. --D/w pt: He is not interesting in any further work up for his cancer.  He does not want any treatment for cancer.      Tobacco abuse, nicotine patch  Moderate to severe emphysema, duoneb prn  Hx bladder CA s/p TURP  Underweight.          Code: DNR/DNI.    DVT prophylaxis:  SCD  Surrogate decision maker:  wife     Baseline: lives with wife, ambulating with walker   Recommended Disposition: Home w/Family today  D/w pt DC planning: discussed to wait pathology and if cancer to consider Hospice. Pt declined hospice consult at this time. He confirmed his wishes in regard of no more surgeries or treatment for cancer but in the same time he doesn't want hospice referral. He would like to go home today and follow with GI/PCP OP for pathology result. SW consult for DC planning, will try to arrange health care dispatch         _______________________________________________________________________  Patient seen and examined by me on discharge day. PHYSICAL EXAM:  General:                    WD, WN. Alert, cooperative, no acute distress    EENT:                                  EOMI. Anicteric sclerae. MMM  Resp:                                   CTA bilaterally, no wheezing or rales. No accessory muscle use  CV:                                      Regular  rhythm,  No edema  GI:                                       Soft, Non distended, + tender, no guardian.  +Bowel sounds                           + erythema around old PEG site - is much better   Neurologic:                Alert and oriented X 3, normal speech,   Psych:                       Good insight. Not anxious nor agitated  Skin:                                    No rashes. No jaundice  _______________________________________________________________________  DISCHARGE MEDICATIONS:   Current Discharge Medication List      START taking these medications    Details   amLODIPine (NORVASC) 10 mg tablet Take 1 Tab by mouth daily. Qty: 30 Tab, Refills: 0      L. acidoph & paracasei- S therm- Bifido (ELLEN-Q/RISAQUAD) 8 billion cell cap cap Take 1 Cap by mouth daily.   Qty: 20 Cap, Refills: 0 oxyCODONE-acetaminophen (PERCOCET) 5-325 mg per tablet Take 1 Tab by mouth every four (4) hours as needed for Pain. Max Daily Amount: 6 Tabs. Qty: 20 Tab, Refills: 0    Associated Diagnoses: Gastrointestinal hemorrhage associated with gastric ulcer      pantoprazole (PROTONIX) 40 mg tablet Take 1 Tab by mouth Before breakfast and dinner. Qty: 60 Tab, Refills: 0      sucralfate (CARAFATE) 100 mg/mL suspension Take 10 mL by mouth Before breakfast, lunch, dinner and at bedtime for 30 days. Qty: 1200 mL, Refills: 0      cephALEXin (KEFLEX) 125 mg/5 mL suspension Take 20 mL by mouth three (3) times daily for 3 days. Qty: 180 mL, Refills: 0      naloxone (NARCAN) 4 mg/actuation nasal spray Use 1 spray intranasally into 1 nostril. Call 911. Use a new Narcan nasal spray for subsequent doses and administer into alternating nostrils. May repeat every 2 to 3 minutes as needed. Qty: 1 Each, Refills: 0         CONTINUE these medications which have NOT CHANGED    Details   ferrous sulfate (IRON) 325 mg (65 mg iron) tablet Take 325 mg by mouth Daily (before breakfast). multivitamin (ONE A DAY) tablet Take 1 Tab by mouth daily. STOP taking these medications       naproxen sodium (ALEVE) 220 mg tablet Comments:   Reason for Stopping:         ACETAMINOPHEN/DIPHENHYDRAMINE (TYLENOL PM PO) Comments:   Reason for Stopping:               My Recommended Diet, Activity, Wound Care, and follow-up labs are listed in the patient's Discharge Insturctions which I have personally completed and reviewed.     ______________________________________________________________________    Risk of deterioration: Moderate    Condition at Discharge:  Stable  ______________________________________________________________________    Disposition  Home with family, no needs  ______________________________________________________________________    Care Plan discussed with:   Patient, RN, Care Manager    ______________________________________________________________________    Code Status: DNR   ______________________________________________________________________      Follow up with:   PCP : Luna Shen MD  Follow-up Information     Follow up With Details Comments Emi Bean MD In 1 week  [facility]  [phone]      Zak Rosario MD In 2 weeks call office in 2 days to follow on pathology result  [place] [id number]  [phone]                Total time in minutes spent coordinating this discharge (includes going over instructions, follow-up, prescriptions, and preparing report for sign off to her PCP) :  > 30 minutes    Signed:  Ward Blankenship MD

## 2018-06-11 ENCOUNTER — TELEPHONE (OUTPATIENT)
Dept: CASE MANAGEMENT | Age: 58
End: 2018-06-11

## 2018-06-11 NOTE — TELEPHONE ENCOUNTER
[address] Intake [facility]) contacted CM to inform that they are able to accept pt for SN services and to do his BMP/CBC on 6/12 or 6/13 from their [place] office. Requesting CM fax discharge summary to [phone]. CM faxed dc summary. SOC within 24-48 hours per [facility].      MISA Espinoza Supervisee in Social Work, [employer]  [phone]

## 2018-06-14 ENCOUNTER — OFFICE VISIT (OUTPATIENT)
Dept: FAMILY MEDICINE CLINIC | Age: 58
End: 2018-06-14

## 2018-06-14 VITALS
HEIGHT: 69 IN | TEMPERATURE: 99.6 F | WEIGHT: 105 LBS | OXYGEN SATURATION: 99 % | DIASTOLIC BLOOD PRESSURE: 96 MMHG | HEART RATE: 100 BPM | RESPIRATION RATE: 18 BRPM | BODY MASS INDEX: 15.55 KG/M2 | SYSTOLIC BLOOD PRESSURE: 150 MMHG

## 2018-06-14 DIAGNOSIS — K25.4 GASTROINTESTINAL HEMORRHAGE ASSOCIATED WITH GASTRIC ULCER: Primary | ICD-10-CM

## 2018-06-14 RX ORDER — OXYCODONE AND ACETAMINOPHEN 5; 325 MG/1; MG/1
1 TABLET ORAL
Qty: 15 TAB | Refills: 0 | Status: SHIPPED | OUTPATIENT
Start: 2018-06-14 | End: 2019-08-20

## 2018-06-21 ENCOUNTER — TELEPHONE (OUTPATIENT)
Dept: FAMILY MEDICINE CLINIC | Age: 58
End: 2018-06-21

## 2019-08-07 ENCOUNTER — HOSPITAL ENCOUNTER (INPATIENT)
Age: 59
LOS: 13 days | Discharge: HOME | DRG: 240 | End: 2019-08-20
Attending: INTERNAL MEDICINE | Admitting: HOSPITALIST
Payer: MEDICAID

## 2019-08-07 DIAGNOSIS — G89.3 CANCER ASSOCIATED PAIN: Primary | ICD-10-CM

## 2019-08-07 DIAGNOSIS — R13.19 ESOPHAGEAL DYSPHAGIA: ICD-10-CM

## 2019-08-07 DIAGNOSIS — M54.2 NECK PAIN: ICD-10-CM

## 2019-08-07 PROBLEM — R13.10 DYSPHAGIA: Status: ACTIVE | Noted: 2019-08-07

## 2019-08-07 PROCEDURE — 74011250636 HC RX REV CODE- 250/636: Performed by: HOSPITALIST

## 2019-08-07 PROCEDURE — 65270000032 HC RM SEMIPRIVATE

## 2019-08-07 PROCEDURE — 74011000258 HC RX REV CODE- 258: Performed by: HOSPITALIST

## 2019-08-07 RX ORDER — SODIUM CHLORIDE 0.9 % (FLUSH) 0.9 %
5-40 SYRINGE (ML) INJECTION AS NEEDED
Status: DISCONTINUED | OUTPATIENT
Start: 2019-08-07 | End: 2019-08-20 | Stop reason: HOSPADM

## 2019-08-07 RX ORDER — HYDRALAZINE HYDROCHLORIDE 20 MG/ML
20 INJECTION INTRAMUSCULAR; INTRAVENOUS
Status: DISCONTINUED | OUTPATIENT
Start: 2019-08-07 | End: 2019-08-20 | Stop reason: HOSPADM

## 2019-08-07 RX ORDER — DEXTROSE MONOHYDRATE AND SODIUM CHLORIDE 5; .9 G/100ML; G/100ML
75 INJECTION, SOLUTION INTRAVENOUS CONTINUOUS
Status: DISCONTINUED | OUTPATIENT
Start: 2019-08-07 | End: 2019-08-09

## 2019-08-07 RX ORDER — ACETAMINOPHEN 325 MG/1
650 TABLET ORAL
Status: DISCONTINUED | OUTPATIENT
Start: 2019-08-07 | End: 2019-08-20 | Stop reason: HOSPADM

## 2019-08-07 RX ORDER — ONDANSETRON 2 MG/ML
4 INJECTION INTRAMUSCULAR; INTRAVENOUS
Status: DISCONTINUED | OUTPATIENT
Start: 2019-08-07 | End: 2019-08-20 | Stop reason: HOSPADM

## 2019-08-07 RX ORDER — SODIUM CHLORIDE 0.9 % (FLUSH) 0.9 %
5-40 SYRINGE (ML) INJECTION EVERY 8 HOURS
Status: DISCONTINUED | OUTPATIENT
Start: 2019-08-07 | End: 2019-08-20 | Stop reason: HOSPADM

## 2019-08-07 RX ORDER — IPRATROPIUM BROMIDE AND ALBUTEROL SULFATE 2.5; .5 MG/3ML; MG/3ML
3 SOLUTION RESPIRATORY (INHALATION)
Status: DISCONTINUED | OUTPATIENT
Start: 2019-08-07 | End: 2019-08-20 | Stop reason: HOSPADM

## 2019-08-07 RX ORDER — HEPARIN SODIUM 5000 [USP'U]/ML
5000 INJECTION, SOLUTION INTRAVENOUS; SUBCUTANEOUS EVERY 8 HOURS
Status: DISCONTINUED | OUTPATIENT
Start: 2019-08-07 | End: 2019-08-07

## 2019-08-07 RX ORDER — MORPHINE SULFATE 2 MG/ML
1 INJECTION, SOLUTION INTRAMUSCULAR; INTRAVENOUS
Status: DISCONTINUED | OUTPATIENT
Start: 2019-08-07 | End: 2019-08-08

## 2019-08-07 RX ORDER — HYDROCODONE BITARTRATE AND ACETAMINOPHEN 5; 325 MG/1; MG/1
1 TABLET ORAL
Status: DISCONTINUED | OUTPATIENT
Start: 2019-08-07 | End: 2019-08-20 | Stop reason: HOSPADM

## 2019-08-07 RX ADMIN — MORPHINE SULFATE 1 MG: 2 INJECTION, SOLUTION INTRAMUSCULAR; INTRAVENOUS at 22:45

## 2019-08-07 RX ADMIN — Medication 10 ML: at 22:41

## 2019-08-07 RX ADMIN — DEXTROSE MONOHYDRATE AND SODIUM CHLORIDE 75 ML/HR: 5; .9 INJECTION, SOLUTION INTRAVENOUS at 22:41

## 2019-08-08 LAB
ALBUMIN SERPL-MCNC: 3.4 G/DL (ref 3.5–5)
ALBUMIN/GLOB SERPL: 0.8 {RATIO} (ref 1.1–2.2)
ALP SERPL-CCNC: 99 U/L (ref 45–117)
ALT SERPL-CCNC: 25 U/L (ref 12–78)
ANION GAP SERPL CALC-SCNC: 12 MMOL/L (ref 5–15)
AST SERPL-CCNC: 14 U/L (ref 15–37)
ATRIAL RATE: 67 BPM
BASOPHILS # BLD: 0 K/UL (ref 0–0.1)
BASOPHILS NFR BLD: 0 % (ref 0–1)
BILIRUB SERPL-MCNC: 0.4 MG/DL (ref 0.2–1)
BUN SERPL-MCNC: 13 MG/DL (ref 6–20)
BUN/CREAT SERPL: 18 (ref 12–20)
CALCIUM SERPL-MCNC: 9.9 MG/DL (ref 8.5–10.1)
CALCULATED P AXIS, ECG09: 86 DEGREES
CALCULATED R AXIS, ECG10: 21 DEGREES
CALCULATED T AXIS, ECG11: 67 DEGREES
CHLORIDE SERPL-SCNC: 101 MMOL/L (ref 97–108)
CO2 SERPL-SCNC: 22 MMOL/L (ref 21–32)
CREAT SERPL-MCNC: 0.71 MG/DL (ref 0.7–1.3)
DIAGNOSIS, 93000: NORMAL
DIFFERENTIAL METHOD BLD: ABNORMAL
EOSINOPHIL # BLD: 0 K/UL (ref 0–0.4)
EOSINOPHIL NFR BLD: 0 % (ref 0–7)
ERYTHROCYTE [DISTWIDTH] IN BLOOD BY AUTOMATED COUNT: 13.9 % (ref 11.5–14.5)
GLOBULIN SER CALC-MCNC: 4.3 G/DL (ref 2–4)
GLUCOSE SERPL-MCNC: 166 MG/DL (ref 65–100)
HCT VFR BLD AUTO: 39.4 % (ref 36.6–50.3)
HGB BLD-MCNC: 13 G/DL (ref 12.1–17)
IMM GRANULOCYTES # BLD AUTO: 0 K/UL (ref 0–0.04)
IMM GRANULOCYTES NFR BLD AUTO: 0 % (ref 0–0.5)
INR PPP: 1.1 (ref 0.9–1.1)
LYMPHOCYTES # BLD: 0.5 K/UL (ref 0.8–3.5)
LYMPHOCYTES NFR BLD: 12 % (ref 12–49)
MCH RBC QN AUTO: 28.7 PG (ref 26–34)
MCHC RBC AUTO-ENTMCNC: 33 G/DL (ref 30–36.5)
MCV RBC AUTO: 87 FL (ref 80–99)
MONOCYTES # BLD: 0.1 K/UL (ref 0–1)
MONOCYTES NFR BLD: 2 % (ref 5–13)
NEUTS SEG # BLD: 3.9 K/UL (ref 1.8–8)
NEUTS SEG NFR BLD: 86 % (ref 32–75)
NRBC # BLD: 0 K/UL (ref 0–0.01)
NRBC BLD-RTO: 0 PER 100 WBC
P-R INTERVAL, ECG05: 134 MS
PLATELET # BLD AUTO: 236 K/UL (ref 150–400)
POTASSIUM SERPL-SCNC: 4.5 MMOL/L (ref 3.5–5.1)
PROT SERPL-MCNC: 7.7 G/DL (ref 6.4–8.2)
PROTHROMBIN TIME: 11.4 SEC (ref 9–11.1)
Q-T INTERVAL, ECG07: 446 MS
QRS DURATION, ECG06: 86 MS
QTC CALCULATION (BEZET), ECG08: 471 MS
RBC # BLD AUTO: 4.53 M/UL (ref 4.1–5.7)
RBC MORPH BLD: ABNORMAL
RBC MORPH BLD: ABNORMAL
SODIUM SERPL-SCNC: 135 MMOL/L (ref 136–145)
VENTRICULAR RATE, ECG03: 67 BPM
WBC # BLD AUTO: 4.5 K/UL (ref 4.1–11.1)

## 2019-08-08 PROCEDURE — 97161 PT EVAL LOW COMPLEX 20 MIN: CPT

## 2019-08-08 PROCEDURE — 97116 GAIT TRAINING THERAPY: CPT

## 2019-08-08 PROCEDURE — 74011000250 HC RX REV CODE- 250: Performed by: HOSPITALIST

## 2019-08-08 PROCEDURE — 85025 COMPLETE CBC W/AUTO DIFF WBC: CPT

## 2019-08-08 PROCEDURE — 97165 OT EVAL LOW COMPLEX 30 MIN: CPT

## 2019-08-08 PROCEDURE — 36415 COLL VENOUS BLD VENIPUNCTURE: CPT

## 2019-08-08 PROCEDURE — 93005 ELECTROCARDIOGRAM TRACING: CPT

## 2019-08-08 PROCEDURE — 74011250636 HC RX REV CODE- 250/636: Performed by: PHYSICAL MEDICINE & REHABILITATION

## 2019-08-08 PROCEDURE — 80053 COMPREHEN METABOLIC PANEL: CPT

## 2019-08-08 PROCEDURE — 85610 PROTHROMBIN TIME: CPT

## 2019-08-08 PROCEDURE — C9113 INJ PANTOPRAZOLE SODIUM, VIA: HCPCS | Performed by: HOSPITALIST

## 2019-08-08 PROCEDURE — 74011000258 HC RX REV CODE- 258: Performed by: HOSPITALIST

## 2019-08-08 PROCEDURE — 74011250636 HC RX REV CODE- 250/636: Performed by: HOSPITALIST

## 2019-08-08 PROCEDURE — 65270000032 HC RM SEMIPRIVATE

## 2019-08-08 RX ORDER — MORPHINE SULFATE 2 MG/ML
2 INJECTION, SOLUTION INTRAMUSCULAR; INTRAVENOUS
Status: DISCONTINUED | OUTPATIENT
Start: 2019-08-08 | End: 2019-08-19

## 2019-08-08 RX ADMIN — MORPHINE SULFATE 2 MG: 2 INJECTION, SOLUTION INTRAMUSCULAR; INTRAVENOUS at 20:00

## 2019-08-08 RX ADMIN — MORPHINE SULFATE 1 MG: 2 INJECTION, SOLUTION INTRAMUSCULAR; INTRAVENOUS at 03:50

## 2019-08-08 RX ADMIN — SODIUM CHLORIDE 40 MG: 9 INJECTION INTRAMUSCULAR; INTRAVENOUS; SUBCUTANEOUS at 08:48

## 2019-08-08 RX ADMIN — MORPHINE SULFATE 1 MG: 2 INJECTION, SOLUTION INTRAMUSCULAR; INTRAVENOUS at 08:48

## 2019-08-08 RX ADMIN — Medication 10 ML: at 14:21

## 2019-08-08 RX ADMIN — DEXTROSE MONOHYDRATE AND SODIUM CHLORIDE 75 ML/HR: 5; .9 INJECTION, SOLUTION INTRAVENOUS at 11:47

## 2019-08-08 RX ADMIN — Medication 10 ML: at 20:00

## 2019-08-08 RX ADMIN — MORPHINE SULFATE 2 MG: 2 INJECTION, SOLUTION INTRAMUSCULAR; INTRAVENOUS at 15:33

## 2019-08-08 RX ADMIN — MORPHINE SULFATE 1 MG: 2 INJECTION, SOLUTION INTRAMUSCULAR; INTRAVENOUS at 12:37
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
## 2019-08-08 NOTE — PROGRESS NOTES
SLP Contact Note    Consult for dysphagia received and appreciated. Patient chart reviewed. Per chart review and discussion with RN, per CT, patient has a \"new masslike circumferential wall thickening of the lower cervical and upper  thoracic esophagus as described above, with severe narrowing/near occlusion of the esophagus at the level of the thoracic inlet, highly suspicious for esophageal carcinoma. \"  Highly suspect that patient's dysphagia and subsequent weight loss is the direct result of this mass. At this juncture, do not feel that SLP can offer any intervention, as this dysphagia is the result of esophageal occlusion and patient would be at high risk for post-prandial aspiration to complete any PO trials as there the bolus would likely either stay in the esophagus or be regurgitated. Would strongly recommend GI consult to further address these findings. SLP will sign-off for now, however, please reconsult should intervention for esophageal occlusion be completed and patient still having difficulties swallowing.       Thank you,  VIANEY ColonEd, [id number] [facility]  Speech-Language Pathologist

## 2019-08-08 NOTE — PROGRESS NOTES
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 He stated that his swallowing progressively worse for the last three weeks and has lost 15 pounds over three weeks. No fevers, chills, sweating, left side chest pain, palpitation, shortness of breath, abdominal pain, urinary complaint, or abnormal bowel movement. He has on and off intermittent dry cough. No lower extremity swelling. The patient does not want any further chemoradiation and has DNR. He states he had a PEG and a tracheostomy in . Interval history / Subjective:   Reports feeling very hungry. Wants to eat. Remains npo given esophageal obstruction for poss peg     Assessment & Plan:      Dysphagia, secondary to esophageal stricture. -CXR empysema  -CT Neck  New masslike circumferential wall thickening of the lower cervical and upper  thoracic esophagus as described above, with severe narrowing/near occlusion of  the esophagus at the level of the thoracic inlet, highly suspicious for  esophageal carcinoma. Endoscopy is recommended. Area of irregular heterogeneous enhancement in the anterior supraglottic larynx measuring 1.0 x 0.6 cm, which may represent treatment-related changes or tumor recurrence. No evidence of cervical lymphadenopathy. Severe calcific atherosclerosis of the carotid bifurcations with at least mild stenosis of the proximal bilateral internal carotid arteries. Moderate centrilobular emphysema. Spiculated subpleural nodular opacity in the right upper lobe measuring 10 mm, which appears stable. follow-up chest CT in 6-12 months is recommended. New mild superior endplate compression deformities of the T4-T6 vertebral bodies since 2015, though these appear to be chronic. Chronic left mandibular condyle fracture  -NPO, IVF  -seen by ST for swallow eval but deemed not safe to test.s/o as dysophagia sec to esophageal cqancer recurrence  -GI c/s, deciding on PEG placement ednoscopic vs IR. 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 The patient does not want any further chemo or radiation treatment. -pt was agreeable to Onc inpt eval therefore order req but later reviwed pPC notes that pt declined Oncology theerfore will cancle Onc c/s and focus on palliative measures with peg, pain control as per pt desire  -prev under onc dr Naa [facility]buena [facility] but has not seen since 2014  -palliative care c/s. Pt is DNR    Severe protein-calorie malnutrition, secondary to dysphagia and underlying malignancy. -pend GI and  Nutritionist consult.  -will start Tf once decision made on peg approach  Chronic T4, T6 compression deformity, stable.    -noted to be chronic findings on CT as above  - p.r.n. Tylenol and morphine. COPD, stable.    -normal O2 saton room air.   p.r.n. DuoNebs    Hypertension.    -p.r.n. IV hydralazine.     DVT prophylaxis. Heparin. We will hold for now in case GI plans on procedure. scd  DNR   Functional status.   Prior to admission, the patient ambulated independently.       Care Plan discussed with: Patient/Family  Disposition: TBD     Hospital Problems  Date Reviewed: 8/7/2019          Codes Class Noted POA    * (Principal) Dysphagia ICD-10-CM: R13.10  ICD-9-CM: 787.20  8/7/2019 Unknown                Review of Systems:   A comprehensive review of systems was negative except for that written in the HPI. Vital Signs:    Last 24hrs VS reviewed since prior progress note. Most recent are:  Visit Vitals  /79   Pulse 81   Temp 97.3 °F (36.3 °C)   Resp 18   Wt 48 kg (105 lb 12.8 oz)   SpO2 92%   BMI 17.08 kg/m²         Intake/Output Summary (Last 24 hours) at 8/8/2019 1224  Last data filed at 8/8/2019 8398  Gross per 24 hour   Intake 648 ml   Output 500 ml   Net 148 ml        Physical Examination:             Constitutional:  No acute distress, cooperative, pleasant ,cachectic. Speech impaired from prior radiation   ENT:  Oral mucous moist, oropharynx benign. Neck supple,    Resp:  CTA bilaterally. No wheezing/rhonchi/rales. No accessory muscle use   CV:  Regular rhythm, normal rate, no murmurs, gallops, rubs    GI:  Soft, non distended, non tender. normoactive bowel sounds, no hepatosplenomegaly     Musculoskeletal:  No edema, warm, 2+ pulses throughout    Neurologic:  Moves all extremities. AAOx3, CN II-XII reviewed            Data Review:    Review and/or order of clinical lab test      Labs:     Recent Labs     08/08/19  0358 08/07/19  1454   WBC 4.5 8.6   HGB 13.0 12.6   HCT 39.4 39.0    249     Recent Labs     08/08/19  0358 08/07/19  1454   * 139   K 4.5 4.0    98   CO2 22 28   BUN 13 16   CREA 0.71 1.00   * 90   CA 9.9 10.0   MG  --  1.7   PHOS  --  2.9     Recent Labs     08/08/19  0358 08/07/19  1454   SGOT 14* 16   ALT 25 31   AP 99 97   TBILI 0.4 0.6   TP 7.7 8.1   ALB 3.4* 3.6   GLOB 4.3* 4.5*   LPSE  --  62*     Recent Labs     08/08/19  0358   INR 1.1   PTP 11.4*      No results for input(s): FE, TIBC, PSAT, FERR in the last 72 hours. Lab Results   Component Value Date/Time    Folate 10.0 01/07/2016 08:41 AM      No results for input(s): PH, PCO2, PO2 in the last 72 hours.   Recent Labs     08/07/19  1454   TROIQ <0.05     No results found for: CHOL, CHOLX, CHLST, CHOLV, HDL, LDL, LDLC, DLDLP, TGLX, TRIGL, TRIGP, CHHD, CHHDX  No results found for: [facility]  Lab Results   Component Value Date/Time    Color YELLOW/STRAW 06/07/2018 09:50 AM    Appearance CLEAR 06/07/2018 09:50 AM    Specific gravity >1.030 (H) 06/07/2018 09:50 AM    Specific gravity >1.030 (H) 06/07/2018 09:50 AM    pH (UA) 5.5 06/07/2018 09:50 AM    Protein NEGATIVE  06/07/2018 09:50 AM    Glucose NEGATIVE  06/07/2018 09:50 AM    Ketone NEGATIVE  06/07/2018 09:50 AM    Bilirubin NEGATIVE  06/07/2018 09:50 AM    Urobilinogen 0.2 06/07/2018 09:50 AM    Nitrites NEGATIVE  06/07/2018 09:50 AM    Leukocyte Esterase NEGATIVE  06/07/2018 09:50 AM    Epithelial cells FEW 06/07/2018 09:50 AM    Bacteria NEGATIVE  06/07/2018 09:50 AM    WBC 0-4 06/07/2018 09:50 AM    RBC 0-5 06/07/2018 09:50 AM         Medications Reviewed:     Current Facility-Administered Medications   Medication Dose Route Frequency    dextrose 5% and 0.9% NaCl infusion  75 mL/hr IntraVENous CONTINUOUS    sodium chloride (NS) flush 5-40 mL  5-40 mL IntraVENous Q8H    sodium chloride (NS) flush 5-40 mL  5-40 mL IntraVENous PRN    acetaminophen (TYLENOL) tablet 650 mg  650 mg Oral Q4H PRN    HYDROcodone-acetaminophen (NORCO) 5-325 mg per tablet 1 Tab  1 Tab Oral Q4H PRN    morphine injection 1 mg  1 mg IntraVENous Q4H PRN    hydrALAZINE (APRESOLINE) 20 mg/mL injection 20 mg  20 mg IntraVENous Q6H PRN    pantoprazole (PROTONIX) 40 mg in sodium chloride 0.9% 10 mL injection  40 mg IntraVENous DAILY    albuterol-ipratropium (DUO-NEB) 2.5 MG-0.5 MG/3 ML  3 mL Nebulization Q6H PRN    ondansetron (ZOFRAN) injection 4 mg  4 mg IntraVENous Q4H PRN     ______________________________________________________________________  EXPECTED LENGTH OF STAY: 3d 16h         Rachel Raya MD

## 2019-08-08 NOTE — PROGRESS NOTES
Patient seen by OT for evaluation. Discussed with PT. Patient does not need acute OT followup at this time.

## 2019-08-08 NOTE — PALLIATIVE CARE
Palliative Medicine Social Work    ADDENDUM:  5969  Dr. Candis Miles and I met with patient. He was alert, oriented and engaging. Speech difficult to understand. He related his history of cancer and past treatments. He told us that he has not had follow up with Oncology in many years; has no desire to pursue any further oncologic treatments; saw no use in follow up if having no symptoms (\"If it ain't broke, don't fix it\". )  He described his several week history of dysphagia, worsened speech and pain and L sided head/neck pain. He acknowledges findings in his esophagus, and the high likelihood this is cancer. He confirms, again, he is not interested in biopsy or any treatments-even if they could help his swallow function- does not want XRT or chemo. He is interested in any interventions such as stent that could improve his swallow function. He does have appetite and desire to eat by mouth. If swallow function cannot be improved, he wants PEG; wants \"to let nature take it's course\". He has not completed an AMD.  Confirmed his desire for no CPR, shock or intubation. Briefly talked about the option of hospice when he returns home in setting of suspicion of cancer. He agrees that proactive symptom management and avoiding readmissions to the hospital and letting nature take it's course are his primary goal.    Patient lives at home with wife who is wheel-chair bound from amputation? . He uses walker at home and is able to manage his care independently. She will not be coming to the hospital.    Will consult GI for interventions. Will follow up for [facility] and hospice consult. Mr. Carmen Cantu is a 61year old man with a history significant for tongue cancer s/p chemo/XRT 5 years ago, lung cancer, bladder cancer s/p cystectomy, COPD, EtOH, PEG/Trach (2014). He was transferred from [facility] on 8/7 with a 2 week history of dysphagia and 15 pound weight loss.  CT of neck shows new mass-like circumferential wall thickening of the lower cervical and upper thoracic esophagus with severe narrowing and near occlusion of the esophagus at the level of the thoracic inlet, highly suspicious for esophageal carcinoma. Per chart, patient does not elect further oncologic treatments. He does not have an AMD on file. His wife, Fallon Scales [phone]) is his B[facility] and has communicated a desire for DNR. Palliative team was consulted to assist with care goals. Will follow up. Thank you for the opportunity to be involved in the care of Mr. Aris Munguia. Bettie Bartlett, JAZMINEW, [facility]-  Palliative Medicine   Respecting Choices ® ACP Facilitator   [phone]

## 2019-08-08 NOTE — PROGRESS NOTES
Spiritual Care Assessment/Progress Note  [facility]      NAME: Danielle Hill      MRN: [id number]  AGE: 61 y.o.  SEX: male  Roman Catholic Affiliation: No preference   Language: English     8/8/2019           Spiritual Assessment begun in [facility] through conversation with:         []Patient        [] Family    [] Friend(s)        Reason for Consult: Palliative Care, Initial/Spiritual Assessment     Spiritual beliefs: (Please include comment if needed)     [] Identifies with a giselle tradition:         [] Supported by a giselle community:            [] Claims no spiritual orientation:           [] Seeking spiritual identity:                [] Adheres to an individual form of spirituality:           [x] Not able to assess:                           Identified resources for coping:      [] Prayer                               [] Music                  [] Guided Imagery     [] Family/friends                 [] Pet visits     [] Devotional reading                         [] Unknown     [] Other:                                               Interventions offered during this visit: (See comments for more details)    Patient Interventions: Advance medical directive consult, Initial visit, Other (comment)(Note and AMD information left at bedside)           Plan of Care:     [] Support spiritual and/or cultural needs    [] Support AMD and/or advance care planning process      [] Support grieving process   [] Coordinate Rites and/or Rituals    [] Coordination with community clergy   [] No spiritual needs identified at this time   [] Detailed Plan of Care below (See Comments)  [] Make referral to Music Therapy  [] Make referral to Pet Therapy     [] Make referral to Addiction services  [] Make referral to [facility]  [] Make referral to Spiritual Care Partner  [] No future visits requested        [] Follow up visits as needed     Comments: Visited Mr Tyler Robledo in room 622/02 for initial Palliative Care spiritual assessment and to address in-basket request for AMD information. Mr Narvaez Case appeared to be sleeping soundly and no family was present. Unable to do spiritual assessment at that time. Left a copy of a blank AMD; the booklet, \"Your Right to Decide\"; and a note assuring Mr Narvaez Case of  availability for support &/or to discuss the AMD, at patient's bedside. : . Irma Rich.  Anjel Luna; [facility], to contact [phone] [facility] call: [phone]

## 2019-08-08 NOTE — H&P
[address]  HISTORY AND PHYSICAL    Name:  Ebonie Escobedo  MR#:  [id number]  :  1960  ACCOUNT #:  [de-identified]  ADMIT DATE:  2019      PRIMARY CARE PROVIDER:  Ranulfo Ying MD    SOURCE OF INFORMATION:  Patient. CHIEF COMPLAINT:  Difficulty of swallowing, three weeks' duration. HISTORY OF PRESENTING ILLNESS:  This is a 59-year-old  male with past medical history significant for cancer of the base of the tongue, laryngeal cancer, lung cancer, COPD, high blood pressure, status post chemo and radiation five years ago, who is transferred from [facility] where he presented with difficulty of swallowing, three weeks' duration. He stated that his swallowing progressively worse for the last three weeks and has lost 15 pounds over three weeks. No fevers, chills, sweating, left side chest pain, palpitation, shortness of breath, abdominal pain, urinary complaint, or abnormal bowel movement. He has on and off intermittent dry cough. No lower extremity swelling. The patient does not want any further chemoradiation and has DNR. He states he had a PEG and a tracheostomy in . He had used his PEG for eight months. On arrival, his vital signs, blood pressure was 125/75, temperature 98.1, saturation of oxygen 95% on room air. PAST MEDICAL HISTORY:  1. History of bladder cancer, status post cystectomy. 2.  Cancer of the base of tongue, status post radiation and chemo. 3.  COPD. 4.  Lung cancer. PAST SURGICAL HISTORY:  1. History of PEG tube. 2.  Tracheostomy. ALLERGY:  NO KNOWN DRUG ALLERGY. PRIOR TO ADMISSION MEDICATIONS:  Include,  1. Norvasc 10 mg p.o. daily. 2.  Oxycodone 5/325 mg one tab every four hours as needed. 3.  Kylee-Q one tab p.o. daily. 4.  Protonix 40 mg p.o. daily. 5.  Ferrous sulfate 325 mg p.o. daily. SOCIAL HISTORY:  Lives with his wife. Former smoker and alcohol abuse. Ambulates independently.     CODE STATUS:  DNR. FAMILY HISTORY:  Father had history of cancer, heart disease. Mother had history of cancer. Brother has history of heart disease. REVIEW OF SYSTEMS:  Pertinent positive finding mentioned in HPI. All systems reviewed. No any other positive finding. PHYSICAL EXAMINATION:  GENERAL APPEARANCE:  The patient is alert, cooperative, not in distress. VITAL SIGNS:  Blood pressure 125/75, pulse 91, temperature 98.1, respiratory rate 16, saturation of oxygen 95% on room air. HEENT:  Pink conjunctivae and anicteric sclerae. Moist tongue and buccal mucosa. LUNGS:  Clear to auscultation bilaterally. CHEST WALL:  No tenderness or deformity. ABDOMEN:  Soft, nontender. Bowel sound normal.  No masses or organomegaly. EXTREMITIES:  No cyanosis or edema. CENTRAL NERVOUS SYSTEM:  Conscious. Well oriented to time, place, and person. Motor 5/5. Sensation intact. Cranial nerves II-XII grossly intact. SKIN:  No rash or lesion. LABORATORY DATA:  White blood cell count 8.6, hemoglobin 12.6, hematocrit 39.0, platelet count 462. Urinalysis unremarkable. Sodium 139, potassium 4.0, chloride 98, CO2 of 28, anion gap 13, glucose 90, BUN 16, creatinine 1.00, calcium 10.0, phosphorus 2.9, magnesium 1.7. Total protein 8.1, albumin 3.6, ALT 31, AST 16, alkaline phosphatase 97. Troponin less than 0.05. CT scan of the neck soft tissue, impression:  1. New mass-like circumferential wall thickening of the lower cervical and upper thoracic esophagus, severe narrowing or near occlusion of the esophagus at the level of the thoracic inlet. 2.  Area of irregular heterogeneous enhancement in the anterior supraglottic larynx, measuring 1 x 0.6 cm, which may represent treatment-related change or tumor recurrence. 3.  No evidence of cervical lymphadenopathy. 4.  Severe calcific atherosclerosis of the carotid bifurcation with at least mild stenosis of the proximal bilateral internal carotid artery.   5.  Moderate centrilobular emphysema, spiculated subpleural nodular opacity in the right upper lobe measuring 10 mm which appeared stable, however, followup chest CT in 6-12 months is recommended. 6.  New mild superior endplate compression deformity of T4, T6 since 2017, chronic. 7.  Chronic left mandibular condyle fracture. ASSESSMENT:  1. Dysphagia secondary to esophageal stricture. 2.  Esophageal mass with history of throat cancer, status post radiation and chemotherapy. 3.  Severe protein-calorie malnutrition. 4.  Chronic compression T4, T6 deformity. 5.  History of chronic obstructive pulmonary disease. 6.  History of hypertension. PLAN:  1. Dysphagia secondary to esophageal stricture. Admit the patient to medical oncology floor. Keep the patient n.p.o. Speech for swallowing evaluation. will give him D5 normal saline at 75 mL per hour, will consult GI for possible PEG tube placement. 2.  Esophageal mass with history of base of tongue cancer with possible metastasis, history of radiation and chemotherapy, and he had history of tracheostomy and PEG tube, which he has used for eight months. The patient does not want any further chemo or radiation treatment. We will involve palliative care team.  3.  Severe protein-calorie malnutrition secondary to dysphagia and underlying malignancy. We will consult GI for PEG tube and we will involve nutritionist.  4.  Chronic T4, T6 compression deformity, stable. We will give him p.r.n. Tylenol and morphine. 5.  History of COPD, stable. Saturating 95% on room air. We will add p.r.n. DuoNeb neb treatment. 6.  History of hypertension. Blood pressure is normal.  We will add p.r.n. IV hydralazine. DVT prophylaxis. Heparin. We will hold for now as the patient is going to have PEG tube. Functional status. Prior to admission, the patient ambulated independently.       MD RAFY Vargas/[email or web address]  D:  08/07/2019 22:22  T:  08/08/2019 3:14  JOB #:  [id number]

## 2019-08-08 NOTE — ACP (ADVANCE CARE PLANNING)
Advance Care Planning (ACP) Provider Note - Comprehensive     Date of ACP Conversation: 08/07/19     Persons included in Conversation: Patient and Dr Fern Robb    Patient Active Problem List   Diagnosis Code    Lung mass R91.8    Carcinoma of floor of mouth ([facility] [address] C04.9    Hyposmolality and/or hyponatremia E87.1    Fever and chills R50.9    Line sepsis ([facility] [address] T85.79XA, A41.9    Acute gastric ulcer with hemorrhage K25.0    Alcohol abuse F10.10    Hypertension I10    Hyponatremia E87.1    COPD (chronic obstructive pulmonary disease) ([facility]) J44.9    Tobacco abuse Z72.0    Dysphagia R13.10     These active diagnoses are of sufficient risk that focused discussion on advance care planning is indicated in order to allow the patient to thoughtfully consider him personal goals of care and, if situations arise that prevent the ability to personally give input, to ensure appropriate representation of her; personal desires through documentation or informed surrogate decision makers. Authorized Decision Maker (if patient is incapable of making informed decisions): This person is:  Dick Davis    Discussions : I reviewed his acute medical condition and his desires for ongoing aggressive care, including potential intubation and mechanical ventilation, also discussed who would speak on his behalf should he be unable to do so and what conversation he has had with his family so they understand his desire in such situation. He said his wife Dick Davis is his medical decision maker in case of emergency. He said he wants to be a DNR.           Length of ACP Conversation in minutes:  16 minutes       Reema Germain MD  8/7/2019

## 2019-08-08 NOTE — PROGRESS NOTES
NUTRITION    RECOMMENDATIONS:           1. Tube feeding recommendations if/once pt receives PEG:   --- Jevity 1.5 starting at 25 ml/hr x 8 hours   --- Increase rate by 10 ml/hr every 8 hours until at goal rate of 45 ml/hr   --- Give 120 ml water flush every 4 hours   --- Goal provides: 1620 kcals, 69 gm pro, 1540 ml free fluid    2. Pt is at high risk for refeeding syndrome--please monitor daily CMP, Phos and Mg until pt has been at goal tube feeds for at least 48 hours, and labs have normalized    3. Please weigh pt every Wednesday and Sunday    4. Once pt is stable on goal continuous feedings, he can be transitioned to an easier, bolus feed schedule:   --- 240 ml for 3 feedings + 360 ml for 1 feeding (total of 4 feeds per day)   --- Before and after each bolus feed, give 90 ml water flush     RD INTERVENTION:    Food/Nutrient Delivery:   ,  ,  ,  ,  tube feeding  Nutrition Education: ,    Nutrition Counseling:    Coordination of Care:     ASSESSMENT:   Reason for Assessment:  [x] MD Consult   [x]MST Referral/ BPA   []LOS   []Follow Up   [] Other      PMHx: Pt is a 61year old male admitted on 8/7 with dysphagia, inability to tolerate po for past 2+ weeks. Pt has h/o tobacco and alcohol abuse, tongue cancer, COPD, ? Lung mass. Pt was first diagnosed in 2014, underwent trach and PEG placement, radiation and chemo. He apparently did not follow up with specialists after that period of time. Upon admission here, found to have large esophageal mass which has prohibited him from swallowing. Pt reports back in may 2019 he weighed about 138 pounds (62.7 kg); he is now 105 pounds. This equates to a 24% loss of total body weight. He is very cachectic in appearance, muslce wasting on all extremeties, temporal wasting as well. He meets criteria for severe protein calorie malnutrition. Pt states is [facility] with getting a trach and PEG, but does not plan to pursue additional chemo/radiation treatments.     Meets Criteria for Severe Acute Malnutrition as evidenced by:   [x] Moderate muscle wasting, loss of subcutaneous fat   [x] Nutritional intake of <50% of recommended intake for >5 days   [x] Weight loss of >1-2% in 1 week, >5% in 1 month, >7.5% in 3 months, or >10% in 6 months   [] Moderate-severe edema     Nutrition History:  Usual Appetite: Poor  Diet at Home: (some liquids for past few weeks)  Vitamins/Supplements: No    Current Nutritional Intake:   Diet Order:     Appetite: Poor  PO Ability:    Documented %PO Intake:  No data found. Avg %meal intake:   Average supplements intake:     Average kcal/pro intake:    Estimated Nutrition Needs Met based on Average %meal intake:  Calories %:    Protein %:         Anthropometrics: Wt Readings from Last 8 Encounters:   08/08/19 48 kg (105 lb 12.8 oz)   08/07/19 56.7 kg (125 lb)   06/14/18 47.6 kg (105 lb)   06/09/18 51.6 kg (113 lb 12.1 oz)   06/07/18 48.9 kg (107 lb 12.9 oz)   08/29/17 54.4 kg (120 lb)   01/07/16 46 kg (101 lb 6.4 oz)   12/31/15 46.3 kg (102 lb)     Weight Source: Standing scale (comment)   ,    Body mass index is 17.08 kg/m². ,  , Usual Body Weight: (~ 138 pounds / 62.7 kg),      Estimated Daily Nutrition Requirements: Weight Used: Actual wt  Kcals: (~ 5978-8922 kcals) Based on:Timur William(using actual wt x factor of 1.3-1.4)  Protein: (1.3-1.5 gm pro/kg or 62-72 gm pro)   Fluid: (~ 1 ml/kcal)    GI / Oral / Respiratory Concerns  Abd:  Non distended  BM: pt reports \"a few weeks ago\"   ;  Chew/Swallow: Difficulty (comment)(has esophageal mass);  ;   ;    Skin Integrity: [x]Intact  []Other  Edema: [x]None []Other  Food Allergies: [x]None []Other    Nutritionally Significant Labs & Medications: [x] Reviewed & Includes:  Zofran, protonix    Education & Discharge Needs:   [x] None Identified   [] Identified and addressed    [x] Participated in care plan, discharge planning, Interdisciplinary rounds        Diet Restrictions:  Cultural/Roman Catholic Preference(s): None     NUTRITION DIAGNOSIS:     Malnutrition related to inability to tolerate po as evidenced by large esophageal mass    Pt is at Nutrition Risk:  [x]    No Nutrition Risk Identified:  []    PT GOALS:     1. Pt will tolerate goal tube feeding regimen over the next week    MONITORING & EVALUATION:    Food/Nutrient Intake Outcomes:  Total energy intake, Enteral/parenteral nutrition intake   Physical Signs/Symptoms Outcomes: Weight/weight change        Previous Nutrition Goals:  Previous Goal Met: N/A    Previous Recommendations:      Previous Recommendations Implemented: N/A                                                                                                          Dany Caraballo, [address] [place] [address], [address]

## 2019-08-08 NOTE — ACP (ADVANCE CARE PLANNING)
Visited Mr Romayne Babinski in room 622/02 for initial Palliative Care spiritual assessment and to address in-basket request for AMD information. Mr Romayne Babinski appeared to be sleeping soundly and no family was present. Unable to do spiritual assessment at that time. Left a copy of a blank AMD; the booklet, \"Your Right to Decide\"; and a note assuring Mr Romayne Babinski of  availability for support &/or to discuss the AMD, at patient's bedside. : Rev. Cristhian Marshall.  Piper Joe; [facility], to contact [phone] call: [phone]

## 2019-08-09 ENCOUNTER — ANESTHESIA EVENT (OUTPATIENT)
Dept: ENDOSCOPY | Age: 59
DRG: 240 | End: 2019-08-09
Payer: MEDICAID

## 2019-08-09 ENCOUNTER — APPOINTMENT (OUTPATIENT)
Dept: GENERAL RADIOLOGY | Age: 59
DRG: 240 | End: 2019-08-09
Attending: NURSE PRACTITIONER
Payer: MEDICAID

## 2019-08-09 ENCOUNTER — ANESTHESIA (OUTPATIENT)
Dept: ENDOSCOPY | Age: 59
DRG: 240 | End: 2019-08-09
Payer: MEDICAID

## 2019-08-09 LAB
ALBUMIN SERPL-MCNC: 3.1 G/DL (ref 3.5–5)
ALBUMIN/GLOB SERPL: 0.8 {RATIO} (ref 1.1–2.2)
ALP SERPL-CCNC: 88 U/L (ref 45–117)
ALT SERPL-CCNC: 20 U/L (ref 12–78)
ANION GAP SERPL CALC-SCNC: 4 MMOL/L (ref 5–15)
AST SERPL-CCNC: 16 U/L (ref 15–37)
BASOPHILS # BLD: 0 K/UL (ref 0–0.1)
BASOPHILS NFR BLD: 1 % (ref 0–1)
BILIRUB SERPL-MCNC: 0.3 MG/DL (ref 0.2–1)
BUN SERPL-MCNC: 9 MG/DL (ref 6–20)
BUN/CREAT SERPL: 15 (ref 12–20)
CALCIUM SERPL-MCNC: 9.5 MG/DL (ref 8.5–10.1)
CHLORIDE SERPL-SCNC: 106 MMOL/L (ref 97–108)
CO2 SERPL-SCNC: 29 MMOL/L (ref 21–32)
CREAT SERPL-MCNC: 0.61 MG/DL (ref 0.7–1.3)
DIFFERENTIAL METHOD BLD: ABNORMAL
EOSINOPHIL # BLD: 0.2 K/UL (ref 0–0.4)
EOSINOPHIL NFR BLD: 3 % (ref 0–7)
ERYTHROCYTE [DISTWIDTH] IN BLOOD BY AUTOMATED COUNT: 14.1 % (ref 11.5–14.5)
GLOBULIN SER CALC-MCNC: 3.8 G/DL (ref 2–4)
GLUCOSE SERPL-MCNC: 86 MG/DL (ref 65–100)
HCT VFR BLD AUTO: 37.3 % (ref 36.6–50.3)
HGB BLD-MCNC: 11.7 G/DL (ref 12.1–17)
IMM GRANULOCYTES # BLD AUTO: 0 K/UL (ref 0–0.04)
IMM GRANULOCYTES NFR BLD AUTO: 0 % (ref 0–0.5)
LYMPHOCYTES # BLD: 1.2 K/UL (ref 0.8–3.5)
LYMPHOCYTES NFR BLD: 16 % (ref 12–49)
MCH RBC QN AUTO: 27.7 PG (ref 26–34)
MCHC RBC AUTO-ENTMCNC: 31.4 G/DL (ref 30–36.5)
MCV RBC AUTO: 88.4 FL (ref 80–99)
MONOCYTES # BLD: 0.6 K/UL (ref 0–1)
MONOCYTES NFR BLD: 8 % (ref 5–13)
NEUTS SEG # BLD: 5.7 K/UL (ref 1.8–8)
NEUTS SEG NFR BLD: 72 % (ref 32–75)
NRBC # BLD: 0 K/UL (ref 0–0.01)
NRBC BLD-RTO: 0 PER 100 WBC
PLATELET # BLD AUTO: 212 K/UL (ref 150–400)
PMV BLD AUTO: 11.3 FL (ref 8.9–12.9)
POTASSIUM SERPL-SCNC: 3 MMOL/L (ref 3.5–5.1)
PROT SERPL-MCNC: 6.9 G/DL (ref 6.4–8.2)
RBC # BLD AUTO: 4.22 M/UL (ref 4.1–5.7)
SODIUM SERPL-SCNC: 139 MMOL/L (ref 136–145)
WBC # BLD AUTO: 7.9 K/UL (ref 4.1–11.1)

## 2019-08-09 PROCEDURE — 74011250636 HC RX REV CODE- 250/636: Performed by: HOSPITALIST

## 2019-08-09 PROCEDURE — 74011250636 HC RX REV CODE- 250/636: Performed by: PHYSICAL MEDICINE & REHABILITATION

## 2019-08-09 PROCEDURE — 85025 COMPLETE CBC W/AUTO DIFF WBC: CPT

## 2019-08-09 PROCEDURE — 74011250637 HC RX REV CODE- 250/637: Performed by: INTERNAL MEDICINE

## 2019-08-09 PROCEDURE — 74011000258 HC RX REV CODE- 258: Performed by: HOSPITALIST

## 2019-08-09 PROCEDURE — C9113 INJ PANTOPRAZOLE SODIUM, VIA: HCPCS | Performed by: HOSPITALIST

## 2019-08-09 PROCEDURE — 0DH63UZ INSERTION OF FEEDING DEVICE INTO STOMACH, PERCUTANEOUS APPROACH: ICD-10-PCS | Performed by: INTERNAL MEDICINE

## 2019-08-09 PROCEDURE — 74011250636 HC RX REV CODE- 250/636: Performed by: NURSE ANESTHETIST, CERTIFIED REGISTERED

## 2019-08-09 PROCEDURE — 97116 GAIT TRAINING THERAPY: CPT

## 2019-08-09 PROCEDURE — 76060000031 HC ANESTHESIA FIRST 0.5 HR: Performed by: INTERNAL MEDICINE

## 2019-08-09 PROCEDURE — 76040000019: Performed by: INTERNAL MEDICINE

## 2019-08-09 PROCEDURE — 74011000250 HC RX REV CODE- 250: Performed by: INTERNAL MEDICINE

## 2019-08-09 PROCEDURE — 74011250636 HC RX REV CODE- 250/636: Performed by: INTERNAL MEDICINE

## 2019-08-09 PROCEDURE — C9113 INJ PANTOPRAZOLE SODIUM, VIA: HCPCS | Performed by: INTERNAL MEDICINE

## 2019-08-09 PROCEDURE — 65270000032 HC RM SEMIPRIVATE

## 2019-08-09 PROCEDURE — 80053 COMPREHEN METABOLIC PANEL: CPT

## 2019-08-09 PROCEDURE — 74011250636 HC RX REV CODE- 250/636

## 2019-08-09 PROCEDURE — 74011000250 HC RX REV CODE- 250: Performed by: HOSPITALIST

## 2019-08-09 PROCEDURE — 74220 X-RAY XM ESOPHAGUS 1CNTRST: CPT

## 2019-08-09 PROCEDURE — 36415 COLL VENOUS BLD VENIPUNCTURE: CPT

## 2019-08-09 RX ORDER — FENTANYL CITRATE 50 UG/ML
INJECTION, SOLUTION INTRAMUSCULAR; INTRAVENOUS AS NEEDED
Status: DISCONTINUED | OUTPATIENT
Start: 2019-08-09 | End: 2019-08-09 | Stop reason: HOSPADM

## 2019-08-09 RX ORDER — PROPOFOL 10 MG/ML
INJECTION, EMULSION INTRAVENOUS AS NEEDED
Status: DISCONTINUED | OUTPATIENT
Start: 2019-08-09 | End: 2019-08-09 | Stop reason: HOSPADM

## 2019-08-09 RX ORDER — DEXTROSE, SODIUM CHLORIDE, AND POTASSIUM CHLORIDE 5; .2; .15 G/100ML; G/100ML; G/100ML
75 INJECTION INTRAVENOUS CONTINUOUS
Status: DISCONTINUED | OUTPATIENT
Start: 2019-08-09 | End: 2019-08-16

## 2019-08-09 RX ORDER — LIDOCAINE HYDROCHLORIDE 20 MG/ML
INJECTION, SOLUTION EPIDURAL; INFILTRATION; INTRACAUDAL; PERINEURAL AS NEEDED
Status: DISCONTINUED | OUTPATIENT
Start: 2019-08-09 | End: 2019-08-09 | Stop reason: HOSPADM

## 2019-08-09 RX ORDER — FENTANYL CITRATE 50 UG/ML
INJECTION, SOLUTION INTRAMUSCULAR; INTRAVENOUS
Status: COMPLETED
Start: 2019-08-09 | End: 2019-08-09

## 2019-08-09 RX ORDER — SUCRALFATE 1 G/1
1 TABLET ORAL
Status: DISCONTINUED | OUTPATIENT
Start: 2019-08-09 | End: 2019-08-19

## 2019-08-09 RX ADMIN — MORPHINE SULFATE 2 MG: 2 INJECTION, SOLUTION INTRAMUSCULAR; INTRAVENOUS at 17:17

## 2019-08-09 RX ADMIN — MORPHINE SULFATE 2 MG: 2 INJECTION, SOLUTION INTRAMUSCULAR; INTRAVENOUS at 08:40

## 2019-08-09 RX ADMIN — Medication 10 ML: at 12:53

## 2019-08-09 RX ADMIN — MORPHINE SULFATE 2 MG: 2 INJECTION, SOLUTION INTRAMUSCULAR; INTRAVENOUS at 20:58

## 2019-08-09 RX ADMIN — Medication 10 ML: at 21:03

## 2019-08-09 RX ADMIN — Medication 10 ML: at 08:41

## 2019-08-09 RX ADMIN — MORPHINE SULFATE 2 MG: 2 INJECTION, SOLUTION INTRAMUSCULAR; INTRAVENOUS at 00:09

## 2019-08-09 RX ADMIN — Medication 10 ML: at 00:10

## 2019-08-09 RX ADMIN — Medication 10 ML: at 08:37

## 2019-08-09 RX ADMIN — PROPOFOL 30 MG: 10 INJECTION, EMULSION INTRAVENOUS at 16:49

## 2019-08-09 RX ADMIN — SODIUM CHLORIDE 40 MG: 9 INJECTION INTRAMUSCULAR; INTRAVENOUS; SUBCUTANEOUS at 08:34

## 2019-08-09 RX ADMIN — DEXTROSE MONOHYDRATE, SODIUM CHLORIDE, AND POTASSIUM CHLORIDE 75 ML/HR: 50; 2.25; 1.49 INJECTION, SOLUTION INTRAVENOUS at 20:05

## 2019-08-09 RX ADMIN — DEXTROSE MONOHYDRATE AND SODIUM CHLORIDE 75 ML/HR: 5; .9 INJECTION, SOLUTION INTRAVENOUS at 01:37

## 2019-08-09 RX ADMIN — MORPHINE SULFATE 2 MG: 2 INJECTION, SOLUTION INTRAMUSCULAR; INTRAVENOUS at 05:21

## 2019-08-09 RX ADMIN — Medication 10 ML: at 05:21

## 2019-08-09 RX ADMIN — MORPHINE SULFATE 2 MG: 2 INJECTION, SOLUTION INTRAMUSCULAR; INTRAVENOUS at 12:52

## 2019-08-09 RX ADMIN — LIDOCAINE HYDROCHLORIDE 100 MG: 20 INJECTION, SOLUTION EPIDURAL; INFILTRATION; INTRACAUDAL; PERINEURAL at 16:49

## 2019-08-09 RX ADMIN — SUCRALFATE 1 G: 1 TABLET ORAL at 21:00

## 2019-08-09 RX ADMIN — SODIUM CHLORIDE 40 MG: 9 INJECTION INTRAMUSCULAR; INTRAVENOUS; SUBCUTANEOUS at 20:58

## 2019-08-09 RX ADMIN — PROPOFOL 20 MG: 10 INJECTION, EMULSION INTRAVENOUS at 16:50

## 2019-08-09 RX ADMIN — FENTANYL CITRATE 50 MCG: 50 INJECTION, SOLUTION INTRAMUSCULAR; INTRAVENOUS at 16:43

## 2019-08-09 NOTE — ROUTINE PROCESS
Denny Lights 
1960 
[id number] Situation: 
Verbal report received from: Raya Scott RN 
Procedure: Procedure(s): ESOPHAGOGASTRODUODENOSCOPY (EGD) Background: 
 
Preoperative diagnosis: Dysphagia Postoperative diagnosis: Proximal Esophageal Stricture :  Dr. Laurence Carr Assistant(s): Endoscopy Technician-1: Sara Cruz Endoscopy RN-1: Livia Luque RN Specimens: * No specimens in log * H. Pylori  no Assessment: 
Intra-procedure medications Anesthesia gave intra-procedure sedation and medications, see anesthesia flow sheet yes Intravenous fluids: NS@ Moise Fraise Vital signs stable Abdominal assessment: round and soft Recommendation: 
 
Return to floor Family or Friend Permission to share finding with family or friend yes

## 2019-08-09 NOTE — ANESTHESIA POSTPROCEDURE EVALUATION
Post-Anesthesia Evaluation and Assessment    Patient: Dc Rosa MRN: [id number]  SSN: [id number]    YOB: 1960  Age: 61 y.o. Sex: male      I have evaluated the patient and they are stable and ready for discharge from the PACU. Cardiovascular Function/Vital Signs  Visit Vitals  /74   Pulse 81   Temp 36.8 °C (98.3 °F)   Resp 10   Wt 48 kg (105 lb 12.8 oz)   SpO2 100%   BMI 17.08 kg/m²       Patient is status post MAC anesthesia for Procedure(s):  ESOPHAGOGASTRODUODENOSCOPY (EGD). Nausea/Vomiting: None    Postoperative hydration reviewed and adequate. Pain:  Pain Scale 1: Numeric (0 - 10) (08/09/19 1406)  Pain Intensity 1: 5 (08/09/19 5369)   Managed    Neurological Status:   Neuro  Orientation Level: Oriented X4 (08/09/19 0370)  Cognition: Appropriate decision making; Appropriate for age attention/concentration; Appropriate safety awareness (08/09/19 4662)  Speech: Other (comment)(Hoarse due to diagnosis) (08/09/19 0834)  LLE Motor Response: Weak (08/09/19 0834)  RLE Motor Response: Weak (08/09/19 0834)   At baseline    Mental Status, Level of Consciousness: Alert and  oriented to person, place, and time    Pulmonary Status:   O2 Device: CO2 nasal cannula (08/09/19 2562)   Adequate oxygenation and airway patent    Complications related to anesthesia: None    Post-anesthesia assessment completed. No concerns    Signed By: Philippe Dan MD     August 9, 2019              Procedure(s):  ESOPHAGOGASTRODUODENOSCOPY (EGD). MAC    <BSHSIANPOST>    Vitals Value Taken Time   /60 8/9/2019  4:59 PM   Temp     Pulse 94 8/9/2019  5:01 PM   Resp 10 8/9/2019  5:01 PM   SpO2 98 % 8/9/2019  5:01 PM   Vitals shown include unvalidated device data.

## 2019-08-09 NOTE — PROGRESS NOTES
Problem: Mobility Impaired (Adult and Pediatric)  Goal: *Acute Goals and Plan of Care (Insert Text)  Description  FUNCTIONAL STATUS PRIOR TO ADMISSION: Patient was modified independent using a Rollator for functional mobility. HOME SUPPORT PRIOR TO ADMISSION: The patient lived with wife but did not require assist.    Physical Therapy Goals  Initiated 8/8/2019  1. Patient will move from supine to sit and sit to supine  in bed with independence within 7 day(s). 2.  Patient will transfer from bed to chair and chair to bed with modified independence using the least restrictive device within 7 day(s). 3.  Patient will perform sit to stand with modified independence within 7 day(s). 4.  Patient will ambulate with supervision/set-up for 200 feet with the least restrictive device within 7 day(s). 5.  Patient will ascend/descend 2 stairs with bilat handrail(s) with SBA within 7 day(s). Outcome: Progressing Towards Goal   PHYSICAL THERAPY TREATMENT  Patient: Shari Dillon (76 y.o. male)  Date: 8/9/2019  Diagnosis: Dysphagia [R13.10] Dysphagia       Precautions:    Chart, physical therapy assessment, plan of care and goals were reviewed. ASSESSMENT  Based on the objective data described below, patient continues to present with gait dysfunction and generalized weakness, reduced activity tolerance impacting functional mobility independence. Gait tolerance improved today, does need occasional cues for safety technique with transfers with RW but aware of proper way and able to demo. Reports improved steadiness and tolerance with shoes on vs just non skid socks, ataxia less pronounced. Recommend patient ambulate with nursing staff x 1 assist over the weekend twice daily to maintain strength and mobility. Current Level of Function Impacting Discharge (mobility/balance):  CGA with RW x 300', SBA for transfers    Other factors to consider for discharge: supportive family         PLAN :  Patient continues to benefit from skilled intervention to address the above impairments. Continue treatment per established plan of care. to address goals. Recommendation for discharge: (in order for the patient to meet his/her long term goals)  Physical therapy at least 2 days/week in the home     This discharge recommendation:  Has been made in collaboration with the attending provider and/or case management    Equipment recommendations for successful discharge (if) home: patient owns DME required for discharge       SUBJECTIVE:   Patient stated It feels so good to get off of my behind.     OBJECTIVE DATA SUMMARY:   Critical Behavior:     Orientation Level: Appropriate for age, Oriented X4  Cognition: Appropriate decision making     Functional Mobility Training:  Bed Mobility:     Supine to Sit: Independent  Sit to Supine: Independent           Transfers:  Sit to Stand: Stand-by assistance  Stand to Sit: Supervision                             Balance:  Sitting: Intact  Standing: Intact; With support  Ambulation/Gait Training:  Distance (ft): 300 Feet (ft)  Assistive Device: Gait belt;Walker, rolling  Ambulation - Level of Assistance: Contact guard assistance        Gait Abnormalities: Ataxic              Speed/Chani: Pace decreased (<100 feet/min)                           Activity Tolerance:   Good  Please refer to the flowsheet for vital signs taken during this treatment.     After treatment patient left in no apparent distress:   Supine in bed and Call bell within reach    COMMUNICATION/COLLABORATION:   The patients plan of care was discussed with: Registered Nurse    Paty Turner, PT   Time Calculation: 18 mins

## 2019-08-09 NOTE — ANESTHESIA PREPROCEDURE EVALUATION
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

## 2019-08-09 NOTE — PROGRESS NOTES
Received report from anesthesia staff on vital signs and status of patient.     Scope precleaned at bedside by Dwain Stein

## 2019-08-09 NOTE — PROGRESS NOTES
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 He stated that his swallowing progressively worse for the last three weeks and has lost 15 pounds over three weeks. No fevers, chills, sweating, left side chest pain, palpitation, shortness of breath, abdominal pain, urinary complaint, or abnormal bowel movement. He has on and off intermittent dry cough. No lower extremity swelling. The patient does not want any further chemoradiation and has DNR. He states he had a PEG and a tracheostomy in . Interval history / Subjective:   Reports feeling very hungry. Eager to have peg placed, to be placed today. Assessment & Plan:      Dysphagia, secondary to esophageal stricture. -CXR empysema  -CT Neck  New masslike circumferential wall thickening of the lower cervical and upper  thoracic esophagus as described above, with severe narrowing/near occlusion of  the esophagus at the level of the thoracic inlet, highly suspicious for  esophageal carcinoma. Endoscopy is recommended. Area of irregular heterogeneous enhancement in the anterior supraglottic larynx measuring 1.0 x 0.6 cm, which may represent treatment-related changes or tumor recurrence. No evidence of cervical lymphadenopathy. Severe calcific atherosclerosis of the carotid bifurcations with at least mild stenosis of the proximal bilateral internal carotid arteries. Moderate centrilobular emphysema.  Spiculated subpleural nodular opacity in the right upper lobe measuring 10 mm, which appears stable. follow-up chest CT in 6-12 months is recommended. New mild superior endplate compression deformities of the T4-T6 vertebral bodies since 2015, though these appear to be chronic. Chronic left mandibular condyle fracture  -NPO, IVF  -seen by ST for swallow eval but deemed not safe to test.s/o as dysophagia sec to esophageal cqancer recurrence  -GI on board, for peg today    Esophageal mass, with history of base of tongue cancer with possible metastasis  -history of radiation and chemotherapy, and he had history of tracheostomy and PEG tube, which he has used for eight months. The patient does not want any further chemo or radiation treatment. -pt was agreeable to Onc inpt eval therefore order req but later reviwed pPC notes that pt declined Oncology theerfore will cancle Onc c/s and focus on palliative measures with peg, pain control as per pt desire  -prev under onc dr Tyler Westfall with [facility] but has not seen since 2014  -palliative care c/s. Pt is DNR    Severe protein-calorie malnutrition, secondary to dysphagia and underlying malignancy. -GI and  Nutritionist following. to have peg today then to start TF  -will order Tf   Chronic T4, T6 compression deformity, stable.    -noted to be chronic findings on CT as above  - p.r.n. Tylenol and morphine. COPD, stable.    -normal O2 sat on room air.   p.r.n. DuoNebs    Hypertension.    -p.r.n. IV hydralazine. Hypokalemia  -replete with ivf     DVT prophylaxis. Heparin. On hold awaiting peg.scd  DNR   Functional status.   Prior to admission, the patient ambulated independently.       Care Plan discussed with: Patient/Family  Disposition: TBD     Hospital Problems  Date Reviewed: 8/7/2019          Codes Class Noted POA    * (Principal) Dysphagia ICD-10-CM: R13.10  ICD-9-CM: 787.20  8/7/2019 Unknown                Review of Systems:   A comprehensive review of systems was negative except for that written in the HPI. Vital Signs:    Last 24hrs VS reviewed since prior progress note. Most recent are:  Visit Vitals  /83 (BP 1 Location: Right arm, BP Patient Position: At rest)   Pulse 71   Temp 98.3 °F (36.8 °C)   Resp 18   Wt 48 kg (105 lb 12.8 oz)   SpO2 98%   BMI 17.08 kg/m²         Intake/Output Summary (Last 24 hours) at 8/9/2019 1345  Last data filed at 8/9/2019 0140  Gross per 24 hour   Intake    Output 325 ml   Net -325 ml        Physical Examination:             Constitutional:  No acute distress, cooperative, pleasant ,cachectic. Speech impaired from prior radiation   ENT:  Oral mucous moist, oropharynx benign. Neck supple,    Resp:  CTA bilaterally. No wheezing/rhonchi/rales. No accessory muscle use   CV:  Regular rhythm, normal rate, no murmurs, gallops, rubs    GI:  Soft, non distended, non tender. normoactive bowel sounds, no hepatosplenomegaly     Musculoskeletal:  No edema, warm, 2+ pulses throughout    Neurologic:  Moves all extremities. AAOx3, CN II-XII reviewed            Data Review:    Review and/or order of clinical lab test      Labs:     Recent Labs     08/09/19 0525 08/08/19 0358   WBC 7.9 4.5   HGB 11.7* 13.0   HCT 37.3 39.4    236     Recent Labs     08/09/19  0525 08/08/19 0358 08/07/19  1454    135* 139   K 3.0* 4.5 4.0    101 98   CO2 29 22 28   BUN 9 13 16   CREA 0.61* 0.71 1.00   GLU 86 166* 90   CA 9.5 9.9 10.0   MG  --   --  1.7   PHOS  --   --  2.9     Recent Labs     08/09/19  0525 08/08/19  0358 08/07/19  1454   SGOT 16 14* 16   ALT 20 25 31   AP 88 99 97   TBILI 0.3 0.4 0.6   TP 6.9 7.7 8.1   ALB 3.1* 3.4* 3.6   GLOB 3.8 4.3* 4.5*   LPSE  --   --  62*     Recent Labs     08/08/19 0358   INR 1.1   PTP 11.4*      No results for input(s): FE, TIBC, PSAT, FERR in the last 72 hours.    Lab Results   Component Value Date/Time    Folate 10.0 01/07/2016 08:41 AM      No results for input(s): PH, PCO2, PO2 in the last 72 hours.   Recent Labs     08/07/19  1454   TROIQ <0.05     No results found for: CHOL, CHOLX, CHLST, CHOLV, HDL, LDL, LDLC, DLDLP, TGLX, TRIGL, TRIGP, CHHD, CHHDX  No results found for: [facility]  Lab Results   Component Value Date/Time    Color YELLOW/STRAW 06/07/2018 09:50 AM    Appearance CLEAR 06/07/2018 09:50 AM    Specific gravity >1.030 (H) 06/07/2018 09:50 AM    Specific gravity >1.030 (H) 06/07/2018 09:50 AM    pH (UA) 5.5 06/07/2018 09:50 AM    Protein NEGATIVE  06/07/2018 09:50 AM    Glucose NEGATIVE  06/07/2018 09:50 AM    Ketone NEGATIVE  06/07/2018 09:50 AM    Bilirubin NEGATIVE  06/07/2018 09:50 AM    Urobilinogen 0.2 06/07/2018 09:50 AM    Nitrites NEGATIVE  06/07/2018 09:50 AM    Leukocyte Esterase NEGATIVE  06/07/2018 09:50 AM    Epithelial cells FEW 06/07/2018 09:50 AM    Bacteria NEGATIVE  06/07/2018 09:50 AM    WBC 0-4 06/07/2018 09:50 AM    RBC 0-5 06/07/2018 09:50 AM         Medications Reviewed:     Current Facility-Administered Medications   Medication Dose Route Frequency    morphine injection 2 mg  2 mg IntraVENous Q3H PRN    dextrose 5% and 0.9% NaCl infusion  75 mL/hr IntraVENous CONTINUOUS    sodium chloride (NS) flush 5-40 mL  5-40 mL IntraVENous Q8H    sodium chloride (NS) flush 5-40 mL  5-40 mL IntraVENous PRN    acetaminophen (TYLENOL) tablet 650 mg  650 mg Oral Q4H PRN    HYDROcodone-acetaminophen (NORCO) 5-325 mg per tablet 1 Tab  1 Tab Oral Q4H PRN    hydrALAZINE (APRESOLINE) 20 mg/mL injection 20 mg  20 mg IntraVENous Q6H PRN    pantoprazole (PROTONIX) 40 mg in sodium chloride 0.9% 10 mL injection  40 mg IntraVENous DAILY    albuterol-ipratropium (DUO-NEB) 2.5 MG-0.5 MG/3 ML  3 mL Nebulization Q6H PRN    ondansetron (ZOFRAN) injection 4 mg  4 mg IntraVENous Q4H PRN     ______________________________________________________________________  EXPECTED LENGTH OF STAY: 3d 16h         Melany Nixon MD

## 2019-08-09 NOTE — PROCEDURES
[address]  [address] (EGD) Procedure Note    Dc Rosa  1960  [id number]      Procedure: Endoscopic Gastroduodenoscopy --diagnostic    Indication:dysphagia    Pre-operative Diagnosis: see indication above    Post-operative Diagnosis: see findings below    : Abelino Mcnulty MD    Referring Provider:  Vivek Bhatia MD      Anesthesia/Sedation:  MAC anesthesia Propofol        Procedure Details     After informed consent was obtained for the procedure, with all risks and benefits of procedure explained the patient was taken to the endoscopy suite and placed in the left lateral decubitus position. Following sequential administration of sedation as per above, the endoscope was inserted into the mouth and advanced under direct vision to proximal esophagus. A careful inspection was made as the gastroscope was withdrawn, including a retroflexed view of the proximal stomach; findings and interventions are described below. Findings:   Esophagus: a tight stricture was encountered at the proximal most aspect of the esophagus (approximately 15 cm). This could not be traversed. This was very friable and had an ulcerated appearance, possibly due to radiation-induced injury. Biopsies could not be taken due to the proximal nature of the stricture. Therapies: none    Specimens: none         EBL: None      Complications:   None; patient tolerated the procedure well. Impression:    1. Proximal esophageal stricture    Recommendations:  1. BID IV PPI  2. Carafate liquid 1g QID  3. Clear liquid diet as tolerated  4. Consider IR consultation for G tube placement  5. Will see on request this weekend    Signed By: Abelino Mcnulty MD     8/9/2019  4:56 PM

## 2019-08-09 NOTE — PROGRESS NOTES
Isaac Stiven  1960  [id number]    Situation:    Scheduled Procedure: Procedure(s):  PERCUTANEOUS ENDOSCOPIC GASTROSTOMY TUBE INSERTION  Verbal report received from: Gary Logan RN  Preoperative diagnosis: Dysphagia    Background:    Procedure: Procedure(s):  PERCUTANEOUS ENDOSCOPIC GASTROSTOMY TUBE INSERTION  Physician performing procedure; Dr. Laura Keen RN    NPO Status/Last PO Intake: NPO  Is the patient taking Blood Thinners: no  Is the patient diabetic: no        Does the patient have a Pacemaker/Defibrillator in place?: no  Does the patient need antibiotics before/during/after procedure: yes  Does the patient have SCD in place: no  Is patient on CONTACT precautions: no    Assessment:  Are the vital signs stable prior to patient coming to ENDO?  yes  Is the patient alert/oriented and able to sign consent for the procedures: yes  How does the patient's abdomen feel prior to coming to ENDO? round and soft   Does the patient have a patient IV in place?   yes    Recommendation:  Family or Friend present: no

## 2019-08-09 NOTE — PROGRESS NOTES
[address]  [address] [facility], [address]       GI PROGRESS NOTE  [facility]  [phone] NP in-hospital cell phone M-F until 4:30  After 5pm or on weekends, please call  for physician on call      NAME: Kaiden Conn   :  1960   MRN:  [id number]       Subjective:   No acute events. Objective:     VITALS:   Last 24hrs VS reviewed since prior progress note. Most recent are:  Visit Vitals  /83 (BP 1 Location: Right arm, BP Patient Position: At rest)   Pulse 71   Temp 98.3 °F (36.8 °C)   Resp 18   Wt 48 kg (105 lb 12.8 oz)   SpO2 98%   BMI 17.08 kg/m²       PHYSICAL EXAM:  General: Cooperative, cachectic no acute distress    Neurologic:  Alert and oriented X 3. HEENT: EOMI, no scleral icterus   Lungs:  CTA bilaterally. No wheezing  Heart:  S1 S2   Abdomen: Soft, non-distended, no tenderness. +Bowel sounds  Extremities: No edema  Psych:   Good insight. Not anxious or agitated. Lab Data Reviewed:     Recent Results (from the past 24 hour(s))   METABOLIC PANEL, COMPREHENSIVE    Collection Time: 19  5:25 AM   Result Value Ref Range    Sodium 139 136 - 145 mmol/L    Potassium 3.0 (L) 3.5 - 5.1 mmol/L    Chloride 106 97 - 108 mmol/L    CO2 29 21 - 32 mmol/L    Anion gap 4 (L) 5 - 15 mmol/L    Glucose 86 65 - 100 mg/dL    BUN 9 6 - 20 MG/DL    Creatinine 0.61 (L) 0.70 - 1.30 MG/DL    BUN/Creatinine ratio 15 12 - 20      GFR est AA >60 >60 ml/min/1.73m2    GFR est non-AA >60 >60 ml/min/1.73m2    Calcium 9.5 8.5 - 10.1 MG/DL    Bilirubin, total 0.3 0.2 - 1.0 MG/DL    ALT (SGPT) 20 12 - 78 U/L    AST (SGOT) 16 15 - 37 U/L    Alk.  phosphatase 88 45 - 117 U/L    Protein, total 6.9 6.4 - 8.2 g/dL    Albumin 3.1 (L) 3.5 - 5.0 g/dL    Globulin 3.8 2.0 - 4.0 g/dL    A-G Ratio 0.8 (L) 1.1 - 2.2     CBC WITH AUTOMATED DIFF    Collection Time: 19  5:25 AM   Result Value Ref Range    WBC 7.9 4.1 - 11.1 K/uL    RBC 4.22 4.10 - 5.70 M/uL HGB 11.7 (L) 12.1 - 17.0 g/dL    HCT 37.3 36.6 - 50.3 %    MCV 88.4 80.0 - 99.0 FL    MCH 27.7 26.0 - 34.0 PG    MCHC 31.4 30.0 - 36.5 g/dL    RDW 14.1 11.5 - 14.5 %    PLATELET 057 556 - 618 K/uL    MPV 11.3 8.9 - 12.9 FL    NRBC 0.0 0  WBC    ABSOLUTE NRBC 0.00 0.00 - 0.01 K/uL    NEUTROPHILS 72 32 - 75 %    LYMPHOCYTES 16 12 - 49 %    MONOCYTES 8 5 - 13 %    EOSINOPHILS 3 0 - 7 %    BASOPHILS 1 0 - 1 %    IMMATURE GRANULOCYTES 0 0.0 - 0.5 %    ABS. NEUTROPHILS 5.7 1.8 - 8.0 K/UL    ABS. LYMPHOCYTES 1.2 0.8 - 3.5 K/UL    ABS. MONOCYTES 0.6 0.0 - 1.0 K/UL    ABS. EOSINOPHILS 0.2 0.0 - 0.4 K/UL    ABS. BASOPHILS 0.0 0.0 - 0.1 K/UL    ABS. IMM. GRANS. 0.0 0.00 - 0.04 K/UL    DF AUTOMATED       Assessment:   · Dysphagia: esophageal mass, concern for esophageal carcinoma - he does not want biopsy or treatment for possible malignancy  · Severe protein-calorie malnutrition     Patient Active Problem List   Diagnosis Code    Lung mass R91.8    Carcin[facility] of [facility] of [facility] ([facility] C04.9    Hyposmolality and/or hyponatremia E87.1    Fever and chills R50.9    Line sepsis ([facility]) T85.79XA, A41.9    Acute gastric ulcer with hemorrhage K25.0    Alcohol abuse F10.10    Hypertension I10    Hyponatremia E87.1    COPD (chronic obstructive pulmonary disease) ([facility]) J44.9    Tobacco abuse Z72.0    Dysphagia R13.10     Plan:   · Barium esophagram  · Consider EGD +/- PEG placement based on results  · PPI  · Supportive care     Signed By: Grazyna Michelle NP     8/9/2019  10:07 AM     1430: Esophagram with irregularity and luminal narrowing of the proximal esophagus. Plan to further evaluate with EGD and possible PEG placement, patient is agreeable.       GI Attending: I have discussed CT and Barium esophagram results with patient. He is agreeable to have diagnostic EGD and PEG tube placement, if possible. He is agreeable to have endoscopic biopsies. Ralph Conway MD

## 2019-08-10 LAB
ANION GAP SERPL CALC-SCNC: 6 MMOL/L (ref 5–15)
BUN SERPL-MCNC: 4 MG/DL (ref 6–20)
BUN/CREAT SERPL: 7 (ref 12–20)
CALCIUM SERPL-MCNC: 8.9 MG/DL (ref 8.5–10.1)
CHLORIDE SERPL-SCNC: 101 MMOL/L (ref 97–108)
CO2 SERPL-SCNC: 33 MMOL/L (ref 21–32)
CREAT SERPL-MCNC: 0.56 MG/DL (ref 0.7–1.3)
ERYTHROCYTE [DISTWIDTH] IN BLOOD BY AUTOMATED COUNT: 13.7 % (ref 11.5–14.5)
GLUCOSE SERPL-MCNC: 106 MG/DL (ref 65–100)
HCT VFR BLD AUTO: 37.4 % (ref 36.6–50.3)
HGB BLD-MCNC: 12 G/DL (ref 12.1–17)
MCH RBC QN AUTO: 27.8 PG (ref 26–34)
MCHC RBC AUTO-ENTMCNC: 32.1 G/DL (ref 30–36.5)
MCV RBC AUTO: 86.6 FL (ref 80–99)
NRBC # BLD: 0 K/UL (ref 0–0.01)
NRBC BLD-RTO: 0 PER 100 WBC
PLATELET # BLD AUTO: 224 K/UL (ref 150–400)
PMV BLD AUTO: 11.3 FL (ref 8.9–12.9)
POTASSIUM SERPL-SCNC: 3.1 MMOL/L (ref 3.5–5.1)
RBC # BLD AUTO: 4.32 M/UL (ref 4.1–5.7)
SODIUM SERPL-SCNC: 140 MMOL/L (ref 136–145)
WBC # BLD AUTO: 6.2 K/UL (ref 4.1–11.1)

## 2019-08-10 PROCEDURE — 74011250637 HC RX REV CODE- 250/637: Performed by: INTERNAL MEDICINE

## 2019-08-10 PROCEDURE — 74011000250 HC RX REV CODE- 250: Performed by: INTERNAL MEDICINE

## 2019-08-10 PROCEDURE — 74011250636 HC RX REV CODE- 250/636: Performed by: HOSPITALIST

## 2019-08-10 PROCEDURE — 80048 BASIC METABOLIC PNL TOTAL CA: CPT

## 2019-08-10 PROCEDURE — 85027 COMPLETE CBC AUTOMATED: CPT

## 2019-08-10 PROCEDURE — 36415 COLL VENOUS BLD VENIPUNCTURE: CPT

## 2019-08-10 PROCEDURE — C9113 INJ PANTOPRAZOLE SODIUM, VIA: HCPCS | Performed by: INTERNAL MEDICINE

## 2019-08-10 PROCEDURE — 74011250636 HC RX REV CODE- 250/636: Performed by: PHYSICAL MEDICINE & REHABILITATION

## 2019-08-10 PROCEDURE — 65270000032 HC RM SEMIPRIVATE

## 2019-08-10 PROCEDURE — 74011250636 HC RX REV CODE- 250/636: Performed by: INTERNAL MEDICINE

## 2019-08-10 RX ADMIN — Medication 10 ML: at 11:53

## 2019-08-10 RX ADMIN — MORPHINE SULFATE 2 MG: 2 INJECTION, SOLUTION INTRAMUSCULAR; INTRAVENOUS at 21:27

## 2019-08-10 RX ADMIN — SUCRALFATE 1 G: 1 TABLET ORAL at 16:37

## 2019-08-10 RX ADMIN — Medication 10 ML: at 14:36

## 2019-08-10 RX ADMIN — MORPHINE SULFATE 2 MG: 2 INJECTION, SOLUTION INTRAMUSCULAR; INTRAVENOUS at 03:09

## 2019-08-10 RX ADMIN — Medication 10 ML: at 08:55

## 2019-08-10 RX ADMIN — Medication 10 ML: at 21:22

## 2019-08-10 RX ADMIN — Medication 10 ML: at 06:00

## 2019-08-10 RX ADMIN — SUCRALFATE 1 G: 1 TABLET ORAL at 21:22

## 2019-08-10 RX ADMIN — SODIUM CHLORIDE 40 MG: 9 INJECTION INTRAMUSCULAR; INTRAVENOUS; SUBCUTANEOUS at 08:55

## 2019-08-10 RX ADMIN — SUCRALFATE 1 G: 1 TABLET ORAL at 06:29

## 2019-08-10 RX ADMIN — MORPHINE SULFATE 2 MG: 2 INJECTION, SOLUTION INTRAMUSCULAR; INTRAVENOUS at 11:53

## 2019-08-10 RX ADMIN — SODIUM CHLORIDE 40 MG: 9 INJECTION INTRAMUSCULAR; INTRAVENOUS; SUBCUTANEOUS at 21:22

## 2019-08-10 RX ADMIN — DEXTROSE MONOHYDRATE, SODIUM CHLORIDE, AND POTASSIUM CHLORIDE 75 ML/HR: 50; 2.25; 1.49 INJECTION, SOLUTION INTRAVENOUS at 22:43

## 2019-08-10 RX ADMIN — MORPHINE SULFATE 2 MG: 2 INJECTION, SOLUTION INTRAMUSCULAR; INTRAVENOUS at 16:44

## 2019-08-10 RX ADMIN — MORPHINE SULFATE 2 MG: 2 INJECTION, SOLUTION INTRAMUSCULAR; INTRAVENOUS at 07:40

## 2019-08-10 RX ADMIN — SUCRALFATE 1 G: 1 TABLET ORAL at 11:35

## 2019-08-11 PROCEDURE — 74011250636 HC RX REV CODE- 250/636: Performed by: INTERNAL MEDICINE

## 2019-08-11 PROCEDURE — 74011250637 HC RX REV CODE- 250/637: Performed by: INTERNAL MEDICINE

## 2019-08-11 PROCEDURE — 65270000032 HC RM SEMIPRIVATE

## 2019-08-11 PROCEDURE — 74011000250 HC RX REV CODE- 250: Performed by: INTERNAL MEDICINE

## 2019-08-11 PROCEDURE — 74011250636 HC RX REV CODE- 250/636: Performed by: HOSPITALIST

## 2019-08-11 PROCEDURE — 74011250636 HC RX REV CODE- 250/636: Performed by: PHYSICAL MEDICINE & REHABILITATION

## 2019-08-11 PROCEDURE — C9113 INJ PANTOPRAZOLE SODIUM, VIA: HCPCS | Performed by: INTERNAL MEDICINE

## 2019-08-11 RX ADMIN — MORPHINE SULFATE 2 MG: 2 INJECTION, SOLUTION INTRAMUSCULAR; INTRAVENOUS at 10:54

## 2019-08-11 RX ADMIN — Medication 10 ML: at 21:20

## 2019-08-11 RX ADMIN — MORPHINE SULFATE 2 MG: 2 INJECTION, SOLUTION INTRAMUSCULAR; INTRAVENOUS at 07:05

## 2019-08-11 RX ADMIN — Medication 10 ML: at 13:17

## 2019-08-11 RX ADMIN — SODIUM CHLORIDE 40 MG: 9 INJECTION INTRAMUSCULAR; INTRAVENOUS; SUBCUTANEOUS at 09:24

## 2019-08-11 RX ADMIN — SUCRALFATE 1 G: 1 TABLET ORAL at 11:03

## 2019-08-11 RX ADMIN — Medication 10 ML: at 10:53

## 2019-08-11 RX ADMIN — MORPHINE SULFATE 2 MG: 2 INJECTION, SOLUTION INTRAMUSCULAR; INTRAVENOUS at 21:18

## 2019-08-11 RX ADMIN — MORPHINE SULFATE 2 MG: 2 INJECTION, SOLUTION INTRAMUSCULAR; INTRAVENOUS at 01:41

## 2019-08-11 RX ADMIN — DEXTROSE MONOHYDRATE, SODIUM CHLORIDE, AND POTASSIUM CHLORIDE 75 ML/HR: 50; 2.25; 1.49 INJECTION, SOLUTION INTRAVENOUS at 13:19

## 2019-08-11 RX ADMIN — SUCRALFATE 1 G: 1 TABLET ORAL at 07:00

## 2019-08-11 RX ADMIN — Medication 10 ML: at 09:24

## 2019-08-11 RX ADMIN — MORPHINE SULFATE 2 MG: 2 INJECTION, SOLUTION INTRAMUSCULAR; INTRAVENOUS at 17:15

## 2019-08-11 RX ADMIN — SODIUM CHLORIDE 40 MG: 9 INJECTION INTRAMUSCULAR; INTRAVENOUS; SUBCUTANEOUS at 21:19

## 2019-08-11 RX ADMIN — SUCRALFATE 1 G: 1 TABLET ORAL at 21:18

## 2019-08-11 RX ADMIN — Medication 10 ML: at 17:18

## 2019-08-11 RX ADMIN — SUCRALFATE 1 G: 1 TABLET ORAL at 17:15

## 2019-08-11 RX ADMIN — Medication 10 ML: at 07:00

## 2019-08-11 NOTE — PROGRESS NOTES
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 He stated that his swallowing progressively worse for the last three weeks and has lost 15 pounds over three weeks. No fevers, chills, sweating, left side chest pain, palpitation, shortness of breath, abdominal pain, urinary complaint, or abnormal bowel movement. He has on and off intermittent dry cough. No lower extremity swelling. The patient does not want any further chemoradiation and has DNR. He states he had a PEG and a tracheostomy in . Interval history / Subjective: Failed PEG placement due to severe stricture , G tube by IR  Monday   No new changes      Assessment & Plan:      Dysphagia, secondary to esophageal stricture. -CXR empysema  -CT Neck  New masslike circumferential wall thickening of the lower cervical and upper  thoracic esophagus as described above, with severe narrowing/near occlusion of  the esophagus at the level of the thoracic inlet, highly suspicious for  esophageal carcinoma. Endoscopy is recommended. Area of irregular heterogeneous enhancement in the anterior supraglottic larynx measuring 1.0 x 0.6 cm, which may represent treatment-related changes or tumor recurrence. No evidence of cervical lymphadenopathy. Severe calcific atherosclerosis of the carotid bifurcations with at least mild stenosis of the proximal bilateral internal carotid arteries. Moderate centrilobular emphysema.  Spiculated subpleural nodular opacity in the right upper lobe measuring 10 mm, which appears stable. follow-up chest CT in 6-12 months is recommended. New mild superior endplate compression deformities of the T4-T6 vertebral bodies since 2015, though these appear to be chronic. Chronic left mandibular condyle fracture  -NPO, IVF  -seen by ST for swallow eval but deemed not safe to test.s/o as dysophagia sec to esophageal cqancer recurrence  -failed attempt to pass through the stricture , so G tube attempt will be made Monday by IR     Esophageal mass, with history of base of tongue cancer with possible metastasis  -history of radiation and chemotherapy, and he had history of tracheostomy and PEG tube, which he has used for eight months. The patient does not want any further chemo or radiation treatment. -pt was agreeable to Onc inpt eval therefore order req but later reviwed pPC notes that pt declined Oncology theerfore will cancle Onc c/s and focus on palliative measures with peg, pain control as per pt desire  -prev [facility] onc [facility] but has not seen since 2014  -palliative care c/s. Pt is DNR    Severe protein-calorie malnutrition, secondary to dysphagia and underlying malignancy. -GI and  Nutritionist following. to have peg today then to start TF  -will order Tf   Chronic T4, T6 compression deformity, stable.    -noted to be chronic findings on CT as above  - p.r.n. Tylenol and morphine. COPD, stable.    -normal O2 sat on room air.   p.r.n. DuoNebs    Hypertension.    -p.r.n. IV hydralazine. Hypokalemia  -replete with ivf     DVT prophylaxis. Heparin. On hold awaiting peg.scd  DNR   Functional status.   Prior to admission, the patient ambulated independently.     Care Plan discussed with: Patient/Family  Disposition: TBD     Hospital Problems  Date Reviewed: 8/7/2019          Codes Class Noted POA    * (Principal) Dysphagia ICD-10-CM: R13.10  ICD-9-CM: 787.20  8/7/2019 Unknown Review of Systems:   A comprehensive review of systems was negative except for that written in the HPI. Vital Signs:    Last 24hrs VS reviewed since prior progress note. Most recent are:  Visit Vitals  /68 (BP 1 Location: Right arm, BP Patient Position: At rest)   Pulse 83   Temp 97.8 °F (36.6 °C)   Resp 17   Wt 47.5 kg (104 lb 12.8 oz)   SpO2 99%   BMI 16.92 kg/m²         Intake/Output Summary (Last 24 hours) at 8/11/2019 1229  Last data filed at 8/11/2019 0518  Gross per 24 hour   Intake    Output 1850 ml   Net -1850 ml        Physical Examination:             Constitutional:  No acute distress, cooperative, pleasant ,cachectic. Speech impaired from prior radiation   ENT:  Oral mucous moist, oropharynx benign. Neck supple,    Resp:  CTA bilaterally. No wheezing/rhonchi/rales. No accessory muscle use   CV:  Regular rhythm, normal rate, no murmurs, gallops, rubs    GI:  Soft, non distended, non tender. normoactive bowel sounds, no hepatosplenomegaly     Musculoskeletal:  No edema, warm, 2+ pulses throughout    Neurologic:  Moves all extremities. AAOx3, CN II-XII reviewed            Data Review:    Review and/or order of clinical lab test      Labs:     Recent Labs     08/10/19  0305 08/09/19  0525   WBC 6.2 7.9   HGB 12.0* 11.7*   HCT 37.4 37.3    212     Recent Labs     08/10/19  0305 08/09/19  0525    139   K 3.1* 3.0*    106   CO2 33* 29   BUN 4* 9   CREA 0.56* 0.61*   * 86   CA 8.9 9.5     Recent Labs     08/09/19  0525   SGOT 16   ALT 20   AP 88   TBILI 0.3   TP 6.9   ALB 3.1*   GLOB 3.8     No results for input(s): INR, PTP, APTT in the last 72 hours. No lab exists for component: INREXT, INREXT   No results for input(s): FE, TIBC, PSAT, FERR in the last 72 hours. Lab Results   Component Value Date/Time    Folate 10.0 01/07/2016 08:41 AM      No results for input(s): PH, PCO2, PO2 in the last 72 hours.   No results for input(s): CPK, CKNDX, TROIQ in the last 72 hours.     No lab exists for component: CPKMB  No results found for: CHOL, CHOLX, CHLST, CHOLV, HDL, LDL, LDLC, DLDLP, TGLX, TRIGL, TRIGP, CHHD, CHHDX  No results found for: [facility]  Lab Results   Component Value Date/Time    Color YELLOW/STRAW 06/07/2018 09:50 AM    Appearance CLEAR 06/07/2018 09:50 AM    Specific gravity >1.030 (H) 06/07/2018 09:50 AM    Specific gravity >1.030 (H) 06/07/2018 09:50 AM    pH (UA) 5.5 06/07/2018 09:50 AM    Protein NEGATIVE  06/07/2018 09:50 AM    Glucose NEGATIVE  06/07/2018 09:50 AM    Ketone NEGATIVE  06/07/2018 09:50 AM    Bilirubin NEGATIVE  06/07/2018 09:50 AM    Urobilinogen 0.2 06/07/2018 09:50 AM    Nitrites NEGATIVE  06/07/2018 09:50 AM    Leukocyte Esterase NEGATIVE  06/07/2018 09:50 AM    Epithelial cells FEW 06/07/2018 09:50 AM    Bacteria NEGATIVE  06/07/2018 09:50 AM    WBC 0-4 06/07/2018 09:50 AM    RBC 0-5 06/07/2018 09:50 AM         Medications Reviewed:     Current Facility-Administered Medications   Medication Dose Route Frequency    dextrose 5% - 0.2% NaCl with KCl 20 mEq/L infusion  75 mL/hr IntraVENous CONTINUOUS    pantoprazole (PROTONIX) 40 mg in sodium chloride 0.9% 10 mL injection  40 mg IntraVENous Q12H    sucralfate (CARAFATE) tablet 1 g  1 g Oral AC&HS    morphine injection 2 mg  2 mg IntraVENous Q3H PRN    sodium chloride (NS) flush 5-40 mL  5-40 mL IntraVENous Q8H    sodium chloride (NS) flush 5-40 mL  5-40 mL IntraVENous PRN    acetaminophen (TYLENOL) tablet 650 mg  650 mg Oral Q4H PRN    HYDROcodone-acetaminophen (NORCO) 5-325 mg per tablet 1 Tab  1 Tab Oral Q4H PRN    hydrALAZINE (APRESOLINE) 20 mg/mL injection 20 mg  20 mg IntraVENous Q6H PRN    albuterol-ipratropium (DUO-NEB) 2.5 MG-0.5 MG/3 ML  3 mL Nebulization Q6H PRN    ondansetron (ZOFRAN) injection 4 mg  4 mg IntraVENous Q4H PRN     ______________________________________________________________________  EXPECTED LENGTH OF STAY: 3d 16h         Shante Terrell MD

## 2019-08-11 NOTE — PROGRESS NOTES
Spiritual Care Partner Volunteer visited patient in [address] [place] [address] on 8/11/19. Documented by:   Chaplain Heredia MDiv, MACE  [phone] [address] ([phone])

## 2019-08-12 ENCOUNTER — APPOINTMENT (OUTPATIENT)
Dept: INTERVENTIONAL RADIOLOGY/VASCULAR | Age: 59
DRG: 240 | End: 2019-08-12
Attending: NURSE PRACTITIONER
Payer: MEDICAID

## 2019-08-12 LAB
ALBUMIN SERPL-MCNC: 2.8 G/DL (ref 3.5–5)
ALBUMIN/GLOB SERPL: 0.8 {RATIO} (ref 1.1–2.2)
ALP SERPL-CCNC: 89 U/L (ref 45–117)
ALT SERPL-CCNC: 17 U/L (ref 12–78)
ANION GAP SERPL CALC-SCNC: 5 MMOL/L (ref 5–15)
AST SERPL-CCNC: 11 U/L (ref 15–37)
BASOPHILS # BLD: 0 K/UL (ref 0–0.1)
BASOPHILS NFR BLD: 0 % (ref 0–1)
BILIRUB SERPL-MCNC: 0.4 MG/DL (ref 0.2–1)
BUN SERPL-MCNC: 3 MG/DL (ref 6–20)
BUN/CREAT SERPL: 5 (ref 12–20)
CALCIUM SERPL-MCNC: 9 MG/DL (ref 8.5–10.1)
CHLORIDE SERPL-SCNC: 98 MMOL/L (ref 97–108)
CO2 SERPL-SCNC: 33 MMOL/L (ref 21–32)
CREAT SERPL-MCNC: 0.63 MG/DL (ref 0.7–1.3)
DIFFERENTIAL METHOD BLD: ABNORMAL
EOSINOPHIL # BLD: 0.3 K/UL (ref 0–0.4)
EOSINOPHIL NFR BLD: 4 % (ref 0–7)
ERYTHROCYTE [DISTWIDTH] IN BLOOD BY AUTOMATED COUNT: 13.7 % (ref 11.5–14.5)
GLOBULIN SER CALC-MCNC: 3.7 G/DL (ref 2–4)
GLUCOSE SERPL-MCNC: 113 MG/DL (ref 65–100)
HCT VFR BLD AUTO: 37.1 % (ref 36.6–50.3)
HGB BLD-MCNC: 12.1 G/DL (ref 12.1–17)
IMM GRANULOCYTES # BLD AUTO: 0 K/UL (ref 0–0.04)
IMM GRANULOCYTES NFR BLD AUTO: 0 % (ref 0–0.5)
LYMPHOCYTES # BLD: 1.1 K/UL (ref 0.8–3.5)
LYMPHOCYTES NFR BLD: 17 % (ref 12–49)
MAGNESIUM SERPL-MCNC: 1.5 MG/DL (ref 1.6–2.4)
MCH RBC QN AUTO: 27.8 PG (ref 26–34)
MCHC RBC AUTO-ENTMCNC: 32.6 G/DL (ref 30–36.5)
MCV RBC AUTO: 85.3 FL (ref 80–99)
MONOCYTES # BLD: 0.9 K/UL (ref 0–1)
MONOCYTES NFR BLD: 14 % (ref 5–13)
NEUTS SEG # BLD: 4.3 K/UL (ref 1.8–8)
NEUTS SEG NFR BLD: 65 % (ref 32–75)
NRBC # BLD: 0 K/UL (ref 0–0.01)
NRBC BLD-RTO: 0 PER 100 WBC
PHOSPHATE SERPL-MCNC: 3.1 MG/DL (ref 2.6–4.7)
PLATELET # BLD AUTO: 222 K/UL (ref 150–400)
PMV BLD AUTO: 11.3 FL (ref 8.9–12.9)
POTASSIUM SERPL-SCNC: 3.8 MMOL/L (ref 3.5–5.1)
PROT SERPL-MCNC: 6.5 G/DL (ref 6.4–8.2)
RBC # BLD AUTO: 4.35 M/UL (ref 4.1–5.7)
SODIUM SERPL-SCNC: 136 MMOL/L (ref 136–145)
WBC # BLD AUTO: 6.5 K/UL (ref 4.1–11.1)

## 2019-08-12 PROCEDURE — 77030004561 HC CATH ANGI DX COBRA ANGI -B

## 2019-08-12 PROCEDURE — 74011000250 HC RX REV CODE- 250: Performed by: HOSPITALIST

## 2019-08-12 PROCEDURE — 77030018617 HC TU FEED GASTMY1 COOK -B

## 2019-08-12 PROCEDURE — 83735 ASSAY OF MAGNESIUM: CPT

## 2019-08-12 PROCEDURE — 94664 DEMO&/EVAL PT USE INHALER: CPT

## 2019-08-12 PROCEDURE — 74011250636 HC RX REV CODE- 250/636: Performed by: HOSPITALIST

## 2019-08-12 PROCEDURE — 74011250636 HC RX REV CODE- 250/636: Performed by: INTERNAL MEDICINE

## 2019-08-12 PROCEDURE — 94640 AIRWAY INHALATION TREATMENT: CPT

## 2019-08-12 PROCEDURE — C9113 INJ PANTOPRAZOLE SODIUM, VIA: HCPCS | Performed by: INTERNAL MEDICINE

## 2019-08-12 PROCEDURE — 84100 ASSAY OF PHOSPHORUS: CPT

## 2019-08-12 PROCEDURE — C1769 GUIDE WIRE: HCPCS

## 2019-08-12 PROCEDURE — C1894 INTRO/SHEATH, NON-LASER: HCPCS

## 2019-08-12 PROCEDURE — 97116 GAIT TRAINING THERAPY: CPT

## 2019-08-12 PROCEDURE — 80053 COMPREHEN METABOLIC PANEL: CPT

## 2019-08-12 PROCEDURE — 74011250636 HC RX REV CODE- 250/636: Performed by: STUDENT IN AN ORGANIZED HEALTH CARE EDUCATION/TRAINING PROGRAM

## 2019-08-12 PROCEDURE — 74011000250 HC RX REV CODE- 250: Performed by: STUDENT IN AN ORGANIZED HEALTH CARE EDUCATION/TRAINING PROGRAM

## 2019-08-12 PROCEDURE — 74011250637 HC RX REV CODE- 250/637: Performed by: HOSPITALIST

## 2019-08-12 PROCEDURE — 74011250637 HC RX REV CODE- 250/637: Performed by: INTERNAL MEDICINE

## 2019-08-12 PROCEDURE — 85025 COMPLETE CBC W/AUTO DIFF WBC: CPT

## 2019-08-12 PROCEDURE — 74011250636 HC RX REV CODE- 250/636: Performed by: PHYSICAL MEDICINE & REHABILITATION

## 2019-08-12 PROCEDURE — 49440 PLACE GASTROSTOMY TUBE PERC: CPT

## 2019-08-12 PROCEDURE — 74011000250 HC RX REV CODE- 250: Performed by: INTERNAL MEDICINE

## 2019-08-12 PROCEDURE — 36415 COLL VENOUS BLD VENIPUNCTURE: CPT

## 2019-08-12 PROCEDURE — 65270000032 HC RM SEMIPRIVATE

## 2019-08-12 RX ORDER — CEFAZOLIN SODIUM 1 G/3ML
2 INJECTION, POWDER, FOR SOLUTION INTRAMUSCULAR; INTRAVENOUS ONCE
Status: DISCONTINUED | OUTPATIENT
Start: 2019-08-12 | End: 2019-08-12 | Stop reason: SDUPTHER

## 2019-08-12 RX ORDER — LIDOCAINE HYDROCHLORIDE 10 MG/ML
INJECTION, SOLUTION EPIDURAL; INFILTRATION; INTRACAUDAL; PERINEURAL
Status: DISPENSED
Start: 2019-08-12 | End: 2019-08-13

## 2019-08-12 RX ORDER — LIDOCAINE HYDROCHLORIDE 20 MG/ML
JELLY TOPICAL AS NEEDED
Status: DISCONTINUED | OUTPATIENT
Start: 2019-08-12 | End: 2019-08-12 | Stop reason: HOSPADM

## 2019-08-12 RX ORDER — CEFAZOLIN SODIUM/WATER 2 G/20 ML
2 SYRINGE (ML) INTRAVENOUS ONCE
Status: COMPLETED | OUTPATIENT
Start: 2019-08-12 | End: 2019-08-12

## 2019-08-12 RX ORDER — MIDAZOLAM HYDROCHLORIDE 1 MG/ML
5 INJECTION, SOLUTION INTRAMUSCULAR; INTRAVENOUS
Status: DISCONTINUED | OUTPATIENT
Start: 2019-08-12 | End: 2019-08-12

## 2019-08-12 RX ORDER — SODIUM CHLORIDE 9 MG/ML
25 INJECTION, SOLUTION INTRAVENOUS CONTINUOUS
Status: DISCONTINUED | OUTPATIENT
Start: 2019-08-12 | End: 2019-08-12

## 2019-08-12 RX ORDER — LIDOCAINE HYDROCHLORIDE 10 MG/ML
10 INJECTION, SOLUTION EPIDURAL; INFILTRATION; INTRACAUDAL; PERINEURAL ONCE
Status: DISCONTINUED | OUTPATIENT
Start: 2019-08-12 | End: 2019-08-12 | Stop reason: HOSPADM

## 2019-08-12 RX ORDER — FENTANYL CITRATE 50 UG/ML
200 INJECTION, SOLUTION INTRAMUSCULAR; INTRAVENOUS
Status: DISCONTINUED | OUTPATIENT
Start: 2019-08-12 | End: 2019-08-12

## 2019-08-12 RX ORDER — DIPHENHYDRAMINE HYDROCHLORIDE 50 MG/ML
25-50 INJECTION, SOLUTION INTRAMUSCULAR; INTRAVENOUS
Status: DISCONTINUED | OUTPATIENT
Start: 2019-08-12 | End: 2019-08-12

## 2019-08-12 RX ADMIN — Medication 2 G: at 13:46

## 2019-08-12 RX ADMIN — MORPHINE SULFATE 2 MG: 2 INJECTION, SOLUTION INTRAMUSCULAR; INTRAVENOUS at 09:32

## 2019-08-12 RX ADMIN — FENTANYL CITRATE 25 MCG: 50 INJECTION, SOLUTION INTRAMUSCULAR; INTRAVENOUS at 13:00

## 2019-08-12 RX ADMIN — LIDOCAINE HYDROCHLORIDE 5 ML: 20 JELLY TOPICAL at 14:00

## 2019-08-12 RX ADMIN — SODIUM CHLORIDE 40 MG: 9 INJECTION INTRAMUSCULAR; INTRAVENOUS; SUBCUTANEOUS at 09:33

## 2019-08-12 RX ADMIN — SUCRALFATE 1 G: 1 TABLET ORAL at 21:41

## 2019-08-12 RX ADMIN — DEXTROSE MONOHYDRATE, SODIUM CHLORIDE, AND POTASSIUM CHLORIDE 75 ML/HR: 50; 2.25; 1.49 INJECTION, SOLUTION INTRAVENOUS at 17:44

## 2019-08-12 RX ADMIN — DEXTROSE MONOHYDRATE, SODIUM CHLORIDE, AND POTASSIUM CHLORIDE 75 ML/HR: 50; 2.25; 1.49 INJECTION, SOLUTION INTRAVENOUS at 02:51

## 2019-08-12 RX ADMIN — MORPHINE SULFATE 2 MG: 2 INJECTION, SOLUTION INTRAMUSCULAR; INTRAVENOUS at 14:17

## 2019-08-12 RX ADMIN — MIDAZOLAM HYDROCHLORIDE 1 MG: 1 INJECTION, SOLUTION INTRAMUSCULAR; INTRAVENOUS at 13:53

## 2019-08-12 RX ADMIN — SODIUM CHLORIDE 40 MG: 9 INJECTION INTRAMUSCULAR; INTRAVENOUS; SUBCUTANEOUS at 21:41

## 2019-08-12 RX ADMIN — MORPHINE SULFATE 2 MG: 2 INJECTION, SOLUTION INTRAMUSCULAR; INTRAVENOUS at 06:05

## 2019-08-12 RX ADMIN — MORPHINE SULFATE 2 MG: 2 INJECTION, SOLUTION INTRAMUSCULAR; INTRAVENOUS at 17:43

## 2019-08-12 RX ADMIN — RACEPINEPHRINE HYDROCHLORIDE 1 ML: 11.25 SOLUTION RESPIRATORY (INHALATION) at 14:29

## 2019-08-12 RX ADMIN — FENTANYL CITRATE 25 MCG: 50 INJECTION, SOLUTION INTRAMUSCULAR; INTRAVENOUS at 13:59

## 2019-08-12 RX ADMIN — MORPHINE SULFATE 2 MG: 2 INJECTION, SOLUTION INTRAMUSCULAR; INTRAVENOUS at 21:41

## 2019-08-12 RX ADMIN — IPRATROPIUM BROMIDE AND ALBUTEROL SULFATE 3 ML: .5; 3 SOLUTION RESPIRATORY (INHALATION) at 14:30

## 2019-08-12 RX ADMIN — SODIUM CHLORIDE 25 ML/HR: 900 INJECTION, SOLUTION INTRAVENOUS at 12:53

## 2019-08-12 RX ADMIN — MIDAZOLAM HYDROCHLORIDE 2 MG: 1 INJECTION, SOLUTION INTRAMUSCULAR; INTRAVENOUS at 13:56

## 2019-08-12 RX ADMIN — FENTANYL CITRATE 25 MCG: 50 INJECTION, SOLUTION INTRAMUSCULAR; INTRAVENOUS at 13:52

## 2019-08-12 RX ADMIN — SUCRALFATE 1 G: 1 TABLET ORAL at 06:05

## 2019-08-12 RX ADMIN — DIPHENHYDRAMINE HYDROCHLORIDE 25 MG: 50 INJECTION, SOLUTION INTRAMUSCULAR; INTRAVENOUS at 12:56

## 2019-08-12 RX ADMIN — SUCRALFATE 1 G: 1 TABLET ORAL at 17:20

## 2019-08-12 RX ADMIN — MIDAZOLAM HYDROCHLORIDE 1 MG: 1 INJECTION, SOLUTION INTRAMUSCULAR; INTRAVENOUS at 14:00

## 2019-08-12 RX ADMIN — Medication 10 ML: at 09:33

## 2019-08-12 RX ADMIN — FENTANYL CITRATE 25 MCG: 50 INJECTION, SOLUTION INTRAMUSCULAR; INTRAVENOUS at 13:55

## 2019-08-12 NOTE — PROGRESS NOTES
TRANSFER - OUT REPORT:    Verbal report given to Maggy Davila RN(name) on Jaydon   being transferred to [facility](unit) for routine progression of care       Report consisted of patients Situation, Background, Assessment and   Recommendations(SBAR). Information from the following report(s) Procedure Summary and MAR was reviewed with the receiving nurse. Lines:   Peripheral IV 08/07/19 Right;Upper Arm (Active)   Site Assessment Clean, dry, & intact 8/12/2019  9:46 AM   Phlebitis Assessment 0 8/12/2019  9:46 AM   Infiltration Assessment 0 8/12/2019  9:46 AM   Dressing Status Clean, dry, & intact 8/12/2019  9:46 AM   Dressing Type Transparent 8/12/2019  9:46 AM   Hub Color/Line Status Pink;Flushed; Infusing 8/12/2019  9:46 AM   Action Taken Open ports on tubing capped 8/12/2019  9:46 AM   Alcohol Cap Used Yes 8/12/2019  9:46 AM        Opportunity for questions and clarification was provided.

## 2019-08-12 NOTE — PROGRESS NOTES
[address]  [address] [facility], [address]       GI PROGRESS NOTE  Alisha Montoya, [facility] office  [phone] NP in-hospital cell phone M-F until 4:30  After 5pm or on weekends, please call  for physician on call      NAME: Nichelle Chowdhury   :  1960   MRN:  [id number]       Subjective:   Feeling as expected, hungry. No acute complaints. Objective:     VITALS:   Last 24hrs VS reviewed since prior progress note. Most recent are:  Visit Vitals  /82 (BP 1 Location: Left arm, BP Patient Position: Sitting)   Pulse 91   Temp 97.6 °F (36.4 °C)   Resp 17   Wt 47.5 kg (104 lb 12.8 oz)   SpO2 100%   BMI 16.92 kg/m²       PHYSICAL EXAM:  General: Cooperative, cachectic no acute distress    Neurologic:  Alert and oriented X 3. HEENT: EOMI, no scleral icterus   Lungs:  CTA bilaterally. No wheezing  Heart:  S1 S2   Abdomen: Soft, non-distended, no tenderness. +Bowel sounds  Extremities: No edema  Psych:   Good insight. Not anxious or agitated. Lab Data Reviewed:     Recent Results (from the past 24 hour(s))   CBC WITH AUTOMATED DIFF    Collection Time: 19  4:12 AM   Result Value Ref Range    WBC 6.5 4.1 - 11.1 K/uL    RBC 4.35 4.10 - 5.70 M/uL    HGB 12.1 12.1 - 17.0 g/dL    HCT 37.1 36.6 - 50.3 %    MCV 85.3 80.0 - 99.0 FL    MCH 27.8 26.0 - 34.0 PG    MCHC 32.6 30.0 - 36.5 g/dL    RDW 13.7 11.5 - 14.5 %    PLATELET 627 804 - 488 K/uL    MPV 11.3 8.9 - 12.9 FL    NRBC 0.0 0  WBC    ABSOLUTE NRBC 0.00 0.00 - 0.01 K/uL    NEUTROPHILS 65 32 - 75 %    LYMPHOCYTES 17 12 - 49 %    MONOCYTES 14 (H) 5 - 13 %    EOSINOPHILS 4 0 - 7 %    BASOPHILS 0 0 - 1 %    IMMATURE GRANULOCYTES 0 0.0 - 0.5 %    ABS. NEUTROPHILS 4.3 1.8 - 8.0 K/UL    ABS. LYMPHOCYTES 1.1 0.8 - 3.5 K/UL    ABS. MONOCYTES 0.9 0.0 - 1.0 K/UL    ABS. EOSINOPHILS 0.3 0.0 - 0.4 K/UL    ABS. BASOPHILS 0.0 0.0 - 0.1 K/UL    ABS. IMM.  GRANS. 0.0 0.00 - 0.04 K/UL    DF AUTOMATED     MAGNESIUM Collection Time: 08/12/19  4:12 AM   Result Value Ref Range    Magnesium 1.5 (L) 1.6 - 2.4 mg/dL   PHOSPHORUS    Collection Time: 08/12/19  4:12 AM   Result Value Ref Range    Phosphorus 3.1 2.6 - 4.7 MG/DL   METABOLIC PANEL, COMPREHENSIVE    Collection Time: 08/12/19  4:12 AM   Result Value Ref Range    Sodium 136 136 - 145 mmol/L    Potassium 3.8 3.5 - 5.1 mmol/L    Chloride 98 97 - 108 mmol/L    CO2 33 (H) 21 - 32 mmol/L    Anion gap 5 5 - 15 mmol/L    Glucose 113 (H) 65 - 100 mg/dL    BUN 3 (L) 6 - 20 MG/DL    Creatinine 0.63 (L) 0.70 - 1.30 MG/DL    BUN/Creatinine ratio 5 (L) 12 - 20      GFR est AA >60 >60 ml/min/1.73m2    GFR est non-AA >60 >60 ml/min/1.73m2    Calcium 9.0 8.5 - 10.1 MG/DL    Bilirubin, total 0.4 0.2 - 1.0 MG/DL    ALT (SGPT) 17 12 - 78 U/L    AST (SGOT) 11 (L) 15 - 37 U/L    Alk. 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 Please consult Surgery for feeding tube placement. We will sign off for now. Please call with any questions. Ralph White MD

## 2019-08-12 NOTE — PROGRESS NOTES
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 He stated that his swallowing progressively worse for the last three weeks and has lost 15 pounds over three weeks. No fevers, chills, sweating, left side chest pain, palpitation, shortness of breath, abdominal pain, urinary complaint, or abnormal bowel movement. He has on and off intermittent dry cough. No lower extremity swelling. The patient does not want any further chemoradiation and has DNR. He states he had a PEG and a tracheostomy in . Interval history / Subjective:   F/y Dysphagia  Failed PEG placement due to severe stricture, G tube by IR  Monday   No new changes   Not able to swallow  Pain controlled with the meds     Assessment & Plan:      Dysphagia, secondary to esophageal stricture. -CXR empysema  -CT Neck  New masslike circumferential wall thickening of the lower cervical and upper  thoracic esophagus as described above, with severe narrowing/near occlusion of the esophagus at the level of the thoracic inlet, highly suspicious for esophageal carcinoma. Endoscopy is recommended. Area of irregular heterogeneous enhancement in the anterior supraglottic larynx measuring 1.0 x 0.6 cm, which may represent treatment-related changes or tumor recurrence. No evidence of cervical lymphadenopathy.   Severe calcific atherosclerosis of the carotid bifurcations with at least mild stenosis of the proximal bilateral internal carotid arteries. Moderate centrilobular emphysema. Spiculated subpleural nodular opacity in the right upper lobe measuring 10 mm, which appears stable. follow-up chest CT in 6-12 months is recommended. New mild superior endplate compression deformities of the T4-T6 vertebral bodies since 2015, though these appear to be chronic. 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 The patient does not want any further chemo or radiation treatment.    -Please see Palliative note: Patient clear he does not want oncology or radiation oncology. The patient is ok with PEG tube  -[facility] Lopez [facility] but has not seen since 2014  -palliative care c/s. Pt is DNR    Severe protein-calorie malnutrition, secondary to dysphagia and underlying malignancy. -GI and  Nutritionist following.  -to have peg 8/12 then to start TF  -will order Tf   Chronic T4, T6 compression deformity, stable.    -noted to be chronic findings on CT as above  - p.r.n. Tylenol and morphine. COPD, stable.    -normal O2 sat on room air.   p.r.n. DuoNebs    Hypertension.    -p.r.n. IV hydralazine. Hypokalemia  -replete with ivf     DVT prophylaxis. Heparin. On hold awaiting peg.scd  Code:DNR   Functional status: independent with walker    Plan: PEG tube 8/12.  Discharge once PEG tube feeding stable, probably 8/13 or 8/14    Care Plan discussed with: Patient/Family  Disposition: TBD     Hospital Problems  Date Reviewed: 8/7/2019          Codes Class Noted POA    * (Principal) Dysphagia ICD-10-CM: R13.10  ICD-9-CM: 787.20  8/7/2019 Unknown                Review of Systems:   A comprehensive review of systems was negative except for that written in the HPI. Vital Signs:    Last 24hrs VS reviewed since prior progress note. Most recent are:  Visit Vitals  /82 (BP 1 Location: Left arm, BP Patient Position: Sitting)   Pulse 91   Temp 97.6 °F (36.4 °C)   Resp 17   Wt 47.5 kg (104 lb 12.8 oz)   SpO2 100%   BMI 16.92 kg/m²         Intake/Output Summary (Last 24 hours) at 8/12/2019 0910  Last data filed at 8/12/2019 4781  Gross per 24 hour   Intake    Output 2200 ml   Net -2200 ml        Physical Examination:             Constitutional:  No acute distress, cooperative, pleasant ,cachectic. Speech impaired from prior radiation   ENT:  Oral mucous moist, oropharynx benign. Neck supple,    Resp:  CTA bilaterally. No wheezing/rhonchi/rales. No accessory muscle use   CV:  Regular rhythm, normal rate, no murmurs, gallops, rubs    GI:  Soft, non distended, non tender. normoactive bowel sounds, no hepatosplenomegaly     Musculoskeletal:  No edema, warm, 2+ pulses throughout    Neurologic:  Moves all extremities. AAOx3, CN II-XII reviewed            Data Review:    Review and/or order of clinical lab test      Labs:     Recent Labs     08/12/19 0412 08/10/19  0305   WBC 6.5 6.2   HGB 12.1 12.0*   HCT 37.1 37.4    224     Recent Labs     08/12/19  0412 08/10/19  0305    140   K 3.8 3.1*   CL 98 101   CO2 33* 33*   BUN 3* 4*   CREA 0.63* 0.56*   * 106*   CA 9.0 8.9   MG 1.5*  --    PHOS 3.1  --      Recent Labs     08/12/19  0412   SGOT 11*   ALT 17   AP 89   TBILI 0.4   TP 6.5   ALB 2.8*   GLOB 3.7     No results for input(s): INR, PTP, APTT in the last 72 hours. No lab exists for component: INREXT, INREXT   No results for input(s): FE, TIBC, PSAT, FERR in the last 72 hours. Lab Results   Component Value Date/Time    Folate 10.0 01/07/2016 08:41 AM      No results for input(s): PH, PCO2, PO2 in the last 72 hours.   No results for input(s): CPK, CKNDX, TROIQ in the last 72 hours.     No lab exists for component: CPKMB  No results found for: CHOL, CHOLX, CHLST, CHOLV, HDL, LDL, LDLC, DLDLP, TGLX, TRIGL, TRIGP, CHHD, CHHDX  No results found for: [facility]  Lab Results   Component Value Date/Time    Color YELLOW/STRAW 06/07/2018 09:50 AM    Appearance CLEAR 06/07/2018 09:50 AM    Specific gravity >1.030 (H) 06/07/2018 09:50 AM    Specific gravity >1.030 (H) 06/07/2018 09:50 AM    pH (UA) 5.5 06/07/2018 09:50 AM    Protein NEGATIVE  06/07/2018 09:50 AM    Glucose NEGATIVE  06/07/2018 09:50 AM    Ketone NEGATIVE  06/07/2018 09:50 AM    Bilirubin NEGATIVE  06/07/2018 09:50 AM    Urobilinogen 0.2 06/07/2018 09:50 AM    Nitrites NEGATIVE  06/07/2018 09:50 AM    Leukocyte Esterase NEGATIVE  06/07/2018 09:50 AM    Epithelial cells FEW 06/07/2018 09:50 AM    Bacteria NEGATIVE  06/07/2018 09:50 AM    WBC 0-4 06/07/2018 09:50 AM    RBC 0-5 06/07/2018 09:50 AM         Medications Reviewed:     Current Facility-Administered Medications   Medication Dose Route Frequency    dextrose 5% - 0.2% NaCl with KCl 20 mEq/L infusion  75 mL/hr IntraVENous CONTINUOUS    pantoprazole (PROTONIX) 40 mg in sodium chloride 0.9% 10 mL injection  40 mg IntraVENous Q12H    sucralfate (CARAFATE) tablet 1 g  1 g Oral AC&HS    morphine injection 2 mg  2 mg IntraVENous Q3H PRN    sodium chloride (NS) flush 5-40 mL  5-40 mL IntraVENous Q8H    sodium chloride (NS) flush 5-40 mL  5-40 mL IntraVENous PRN    acetaminophen (TYLENOL) tablet 650 mg  650 mg Oral Q4H PRN    HYDROcodone-acetaminophen (NORCO) 5-325 mg per tablet 1 Tab  1 Tab Oral Q4H PRN    hydrALAZINE (APRESOLINE) 20 mg/mL injection 20 mg  20 mg IntraVENous Q6H PRN    albuterol-ipratropium (DUO-NEB) 2.5 MG-0.5 MG/3 ML  3 mL Nebulization Q6H PRN    ondansetron (ZOFRAN) injection 4 mg  4 mg IntraVENous Q4H PRN     ______________________________________________________________________  EXPECTED LENGTH OF STAY: Jai Proctor MD

## 2019-08-12 NOTE — PALLIATIVE CARE
Palliative Medicine Social Work    ADDENDUM:  Spoke to care manager about plan to place hospice consult on day of discharge so that agency can meet patient and wife at home ([address]). DDNR placed on chart for scanning. Checked back in on patient who informed PEG could not be placed because of the stricture. He was very frustrated and is not sure of the plan; wants someone from GI to come and tell him whether anything can be done for placement of PEG; if nothing can be done, he is ready to be discharged. Nurse informed. Chart reviewed and note EGD findings of tight proximal stricture of esophagus; unable to take biopsy; consideration for IR placement of PEG today. I checked in on patient today. He was alert and engaging; disappointed in findings, but not at all surprised. He continues to feel hunger and hopeful that PEG can be placed today. Inquired if he had the chance to talk to his wife about hospice. He has not, and is not sure how to navigate this conversation, as she is not well herself. She cannot come to the hospital and he cannot speak to her on phone due to difficulty with speech. Talked more about his goals. He completely anticipates his mass is cancer and that stricture was caused or worsened by his previous XRT. He is adamant that he will never consider any oncologic treatments again. He accepts that his life expectancy is limited; does not ever want to be in the hospital again and wants to [place] nature take it's course. Discussed why hospice would be a benefit and is completely aligned with his goals. He agrees and would like for hospice to be arranged after he returns home so that he can talk to his wife. We reviewed his code status and patient signed DDNR this date. Spoke to nurse about the above. Thank you for the opportunity to be involved in the care of Mr. Mg Clements. Bettie Bartlett, JAZMINEW, [facility]-SW  Palliative Medicine   [employer]pecting Choices ® ACP Facilitator   [phone]

## 2019-08-12 NOTE — PROGRESS NOTES
1235 pm- Patient arrived to [facility] holding for procedure. 1240 pm- Dr. Silva Pedraza in to talk with patient about procedure. Patient evaluated and assessed for procedure Consent signed. 1305 pm- Waiting to go to [facility] procedure room when they are ready.
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
## 2019-08-12 NOTE — PROGRESS NOTES
Problem: Mobility Impaired (Adult and Pediatric)  Goal: *Acute Goals and Plan of Care (Insert Text)  Description  FUNCTIONAL STATUS PRIOR TO ADMISSION: Patient was modified independent using a Rollator for functional mobility. HOME SUPPORT PRIOR TO ADMISSION: The patient lived with wife but did not require assist.    Physical Therapy Goals  Initiated 8/8/2019  1. Patient will move from supine to sit and sit to supine  in bed with independence within 7 day(s). 2.  Patient will transfer from bed to chair and chair to bed with modified independence using the least restrictive device within 7 day(s). 3.  Patient will perform sit to stand with modified independence within 7 day(s). 4.  Patient will ambulate with supervision/set-up for 200 feet with the least restrictive device within 7 day(s). 5.  Patient will ascend/descend 2 stairs with bilat handrail(s) with SBA within 7 day(s). Outcome: Progressing Towards Goal   PHYSICAL THERAPY TREATMENT  Patient: Danielle Hill (69 y.o. male)  Date: 8/12/2019  Diagnosis: Dysphagia [R13.10] Dysphagia  Procedure(s) (LRB):  ESOPHAGOGASTRODUODENOSCOPY (EGD) (N/A) 3 Days Post-Op  Precautions:    Chart, physical therapy assessment, plan of care and goals were reviewed. ASSESSMENT  Based on the objective data described below, pt demo increasing tolerance to activity, increasing safety/ stability with amb using RW and shoes. Pt eager to mobilize and increase LOF with mobility. Notes plan for possible G tube placement today. Will benefit from progressive mobility training to increase safety and indep in prep for return to home environment. Current Level of Function Impacting Discharge (mobility/balance): bed mob indep, transfers SBA, amb with RW and shoes CGA/SBA    Other factors to consider for discharge: wife unable to provide physical assist         PLAN :  Patient continues to benefit from skilled intervention to address the above impairments.   Continue treatment per established plan of care. to address goals. Recommendation for discharge: (in order for the patient to meet his/her long term goals)  Physical therapy at least 2 days/week in the home     This discharge recommendation:  Has been made in collaboration with the attending provider and/or case management    Equipment recommendations for successful discharge (if) home: none needed       SUBJECTIVE:   Patient stated We'll see if they can do the tube today.     OBJECTIVE DATA SUMMARY:   Critical Behavior:     Orientation Level: Oriented X4  Cognition: Appropriate decision making     Functional Mobility Training:  Bed Mobility:     Supine to Sit: Independent              Transfers:  Sit to Stand: Stand-by assistance  Stand to Sit: Supervision                             Balance:  Sitting: Intact  Standing: Intact; With support  Ambulation/Gait Training:  Distance (ft): 300 Feet (ft)  Assistive Device: Gait belt;Walker, rolling(shoes)  Ambulation - Level of Assistance: Contact guard assistance;Stand-by assistance        Gait Abnormalities: Ataxic              Speed/Chani: Pace decreased (<100 feet/min)     Pain Rating:  Notes constant pain, rates as manageable    Activity Tolerance:   Good  Please refer to the flowsheet for vital signs taken during this treatment.     After treatment patient left in no apparent distress:   sitting EOB, all needs in reach, nursing aware    COMMUNICATION/COLLABORATION:   The patients plan of care was discussed with: Registered Nurse    Duke Mahajan, PT   Time Calculation: 20 mins

## 2019-08-13 PROCEDURE — 74011250637 HC RX REV CODE- 250/637: Performed by: INTERNAL MEDICINE

## 2019-08-13 PROCEDURE — 74011250636 HC RX REV CODE- 250/636: Performed by: PHYSICAL MEDICINE & REHABILITATION

## 2019-08-13 PROCEDURE — C9113 INJ PANTOPRAZOLE SODIUM, VIA: HCPCS | Performed by: INTERNAL MEDICINE

## 2019-08-13 PROCEDURE — 74011250636 HC RX REV CODE- 250/636: Performed by: HOSPITALIST

## 2019-08-13 PROCEDURE — 97116 GAIT TRAINING THERAPY: CPT

## 2019-08-13 PROCEDURE — 74011250636 HC RX REV CODE- 250/636: Performed by: INTERNAL MEDICINE

## 2019-08-13 PROCEDURE — 65270000032 HC RM SEMIPRIVATE

## 2019-08-13 PROCEDURE — 74011000250 HC RX REV CODE- 250: Performed by: INTERNAL MEDICINE

## 2019-08-13 RX ADMIN — MORPHINE SULFATE 2 MG: 2 INJECTION, SOLUTION INTRAMUSCULAR; INTRAVENOUS at 10:30

## 2019-08-13 RX ADMIN — SODIUM CHLORIDE 40 MG: 9 INJECTION INTRAMUSCULAR; INTRAVENOUS; SUBCUTANEOUS at 09:00

## 2019-08-13 RX ADMIN — SUCRALFATE 1 G: 1 TABLET ORAL at 20:40

## 2019-08-13 RX ADMIN — MORPHINE SULFATE 2 MG: 2 INJECTION, SOLUTION INTRAMUSCULAR; INTRAVENOUS at 16:33

## 2019-08-13 RX ADMIN — SODIUM CHLORIDE 40 MG: 9 INJECTION INTRAMUSCULAR; INTRAVENOUS; SUBCUTANEOUS at 20:41

## 2019-08-13 RX ADMIN — Medication 10 ML: at 06:14

## 2019-08-13 RX ADMIN — MORPHINE SULFATE 2 MG: 2 INJECTION, SOLUTION INTRAMUSCULAR; INTRAVENOUS at 02:17

## 2019-08-13 RX ADMIN — MORPHINE SULFATE 2 MG: 2 INJECTION, SOLUTION INTRAMUSCULAR; INTRAVENOUS at 23:39

## 2019-08-13 RX ADMIN — SUCRALFATE 1 G: 1 TABLET ORAL at 16:08

## 2019-08-13 RX ADMIN — SUCRALFATE 1 G: 1 TABLET ORAL at 11:34

## 2019-08-13 RX ADMIN — DEXTROSE MONOHYDRATE, SODIUM CHLORIDE, AND POTASSIUM CHLORIDE 75 ML/HR: 50; 2.25; 1.49 INJECTION, SOLUTION INTRAVENOUS at 20:42

## 2019-08-13 RX ADMIN — MORPHINE SULFATE 2 MG: 2 INJECTION, SOLUTION INTRAMUSCULAR; INTRAVENOUS at 07:17

## 2019-08-13 RX ADMIN — MORPHINE SULFATE 2 MG: 2 INJECTION, SOLUTION INTRAMUSCULAR; INTRAVENOUS at 19:54

## 2019-08-13 RX ADMIN — DEXTROSE MONOHYDRATE, SODIUM CHLORIDE, AND POTASSIUM CHLORIDE 75 ML/HR: 50; 2.25; 1.49 INJECTION, SOLUTION INTRAVENOUS at 07:31

## 2019-08-13 RX ADMIN — Medication 10 ML: at 20:41

## 2019-08-13 RX ADMIN — SUCRALFATE 1 G: 1 TABLET ORAL at 07:06

## 2019-08-13 NOTE — PROGRESS NOTES
Problem: Mobility Impaired (Adult and Pediatric)  Goal: *Acute Goals and Plan of Care (Insert Text)  Description  FUNCTIONAL STATUS PRIOR TO ADMISSION: Patient was modified independent using a Rollator for functional mobility. HOME SUPPORT PRIOR TO ADMISSION: The patient lived with wife but did not require assist.    Physical Therapy Goals  Initiated 8/8/2019  1. Patient will move from supine to sit and sit to supine  in bed with independence within 7 day(s). 2.  Patient will transfer from bed to chair and chair to bed with modified independence using the least restrictive device within 7 day(s). 3.  Patient will perform sit to stand with modified independence within 7 day(s). 4.  Patient will ambulate with supervision/set-up for 200 feet with the least restrictive device within 7 day(s). 5.  Patient will ascend/descend 2 stairs with bilat handrail(s) with SBA within 7 day(s). Outcome: Progressing Towards Goal   PHYSICAL THERAPY TREATMENT  Patient: Kaiden Conn (76 y.o. male)  Date: 8/13/2019  Diagnosis: Dysphagia [R13.10] Dysphagia  Procedure(s) (LRB):  ESOPHAGOGASTRODUODENOSCOPY (EGD) (N/A) 4 Days Post-Op  Precautions:    Chart, physical therapy assessment, plan of care and goals were reviewed. ASSESSMENT  Based on the objective data described below, patient presents with SBA to CGA for mobility and transfers. Patient demonstrates good balance with use of rolling walker and does not require VC for management. Patient prefers to ambulate with the walker just slightly low and with his shoes donned. He demonstrates good dynamic sitting balance reach to feet don and doff shoes safely. Per patient he is to receive G-tube later this week after failed attempt at PEG placement.      Current Level of Function Impacting Discharge (mobility/balance): CGA-SBA for transfers and gait with RW    Other factors to consider for discharge: medical, patient          PLAN :  Patient continues to benefit from skilled intervention to address the above impairments. Continue treatment per established plan of care. to address goals. Recommendation for discharge: (in order for the patient to meet his/her long term goals)  Physical therapy at least 2 days/week in the home pending progress post G-tube placement    This discharge recommendation:  Has been made in collaboration with the attending provider and/or case management    Equipment recommendations for successful discharge (if) home: none       SUBJECTIVE:   Patient stated i'm optimistic this second procedure will work.     OBJECTIVE DATA SUMMARY:   Critical Behavior:     Orientation Level: Oriented X4  Cognition: Appropriate decision making     Functional Mobility Training:  Bed Mobility:     Supine to Sit: Independent  Sit to Supine: Independent           Transfers:  Sit to Stand: Stand-by assistance  Stand to Sit: Supervision                             Balance:  Sitting: Intact  Standing: Intact; With support  Ambulation/Gait Training:  Distance (ft): 300 Feet (ft)  Assistive Device: Gait belt;Walker, rolling(shoes)  Ambulation - Level of Assistance: Stand-by assistance        Gait Abnormalities: Ataxic              Speed/Chani: Pace decreased (<100 feet/min)                       Stairs: Therapeutic Exercises:     Pain Rating:      Activity Tolerance:   Good  Please refer to the flowsheet for vital signs taken during this treatment.     After treatment patient left in no apparent distress:   Supine in bed and Call bell within reach    COMMUNICATION/COLLABORATION:   The patients plan of care was discussed with: Registered Nurse    Janette Kramer, PT   Time Calculation: 17 mins

## 2019-08-13 NOTE — PROGRESS NOTES
Physical Therapy Note    123  Patient is sleeping and request PT to return later. Will re-attempt as able.      Thank you,  William BENAVIDEZT

## 2019-08-13 NOTE — PROGRESS NOTES
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 He stated that his swallowing progressively worse for the last three weeks and has lost 15 pounds over three weeks. No fevers, chills, sweating, left side chest pain, palpitation, shortness of breath, abdominal pain, urinary complaint, or abnormal bowel movement. He has on and off intermittent dry cough. No lower extremity swelling. The patient does not want any further chemoradiation and has DNR. He states he had a PEG and a tracheostomy in . Interval history / Subjective:   F/y Dysphagia  Failed PEG placement due to severe stricture  G tube could not be placed by IR   Plan for J tube , the patient aware  Right arm swelling  Not able to swallow  Pain controlled with the meds     Assessment & Plan:      Dysphagia, secondary to esophageal stricture. -CXR empysema  -CT Neck  New masslike circumferential wall thickening of the lower cervical and upper  thoracic esophagus as described above, with severe narrowing/near occlusion of the esophagus at the level of the thoracic inlet, highly suspicious for esophageal carcinoma. Endoscopy is recommended. Area of irregular heterogeneous enhancement in the anterior supraglottic larynx measuring 1.0 x 0.6 cm, which may represent treatment-related changes or tumor recurrence. No evidence of cervical lymphadenopathy.   Severe calcific atherosclerosis of the carotid bifurcations with at least mild stenosis of the proximal bilateral internal carotid arteries. Moderate centrilobular emphysema. Spiculated subpleural nodular opacity in the right upper lobe measuring 10 mm, which appears stable. follow-up chest CT in 6-12 months is recommended. New mild superior endplate compression deformities of the T4-T6 vertebral bodies since 2015, though these appear to be chronic. Chronic left mandibular condyle fracture  -NPO, IVF  -seen by ST for swallow eval but deemed not safe to test.s/o as dysphagia sec to esophageal cancer recurrence  -failed attempt to pass through the stricture, J tube scheduled for 8/14    Esophageal mass, with history of base of tongue cancer with possible metastasis  -history of radiation and chemotherapy, and he had history of tracheostomy and PEG tube, which he has used for eight months. The patient does not want any further chemo or radiation treatment.    -Please see Palliative note: Patient clear he does not want oncology or radiation oncology. The patient is ok with PEG tube  -prev under onc dr Jessica Fuentes with [facility] but has not seen since 2014  -palliative care c/s. Pt is DNR    Right arm swelling  -likely sec to infiltration of fluids  -no need to look for DVT as would not change the management    Severe protein-calorie malnutrition, secondary to dysphagia and underlying malignancy. -GI and  Nutritionist following.  -to have peg 8/12 then to start TF  -will order Tf   Chronic T4, T6 compression deformity, stable.    -noted to be chronic findings on CT as above  - p.r.n. Tylenol and morphine. COPD, stable.    -normal O2 sat on room air.   p.r.n. DuoNebs    Hypertension.    -p.r.n. IV hydralazine. Hypokalemia  -replete with ivf     DVT prophylaxis. Heparin.  On hold awaiting peg.scd  Code:DNR   Functional status: independent with walker    Plan: J tube to be placed 8/14    Care Plan discussed with: Patient/Family  Disposition: TBD     Hospital Problems  Date Reviewed: 8/12/2019          Codes Class Noted POA    * (Principal) Dysphagia ICD-10-CM: R13.10  ICD-9-CM: 787.20  8/7/2019 Unknown                Review of Systems:   A comprehensive review of systems was negative except for that written in the HPI. Vital Signs:    Last 24hrs VS reviewed since prior progress note. Most recent are:  Visit Vitals  /88 (BP 1 Location: Left arm, BP Patient Position: At rest)   Pulse 90   Temp 98.2 °F (36.8 °C)   Resp 17   Ht 5' 5.98\" (1.676 m)   Wt 47.5 kg (104 lb 12.8 oz)   SpO2 99%   BMI 16.92 kg/m²         Intake/Output Summary (Last 24 hours) at 8/13/2019 1556  Last data filed at 8/13/2019 1510  Gross per 24 hour   Intake    Output 1450 ml   Net -1450 ml        Physical Examination:             Constitutional:  No acute distress, cooperative, pleasant ,cachectic. Speech impaired from prior radiation   ENT:  Oral mucous moist, oropharynx benign. Neck supple,    Resp:  CTA bilaterally. No wheezing/rhonchi/rales. No accessory muscle use   CV:  Regular rhythm, normal rate, no murmurs, gallops, rubs    GI:  Soft, non distended, non tender. normoactive bowel sounds, no hepatosplenomegaly     Musculoskeletal:  No edema, warm, 2+ pulses throughout    Neurologic:  Moves all extremities. AAOx3, CN II-XII reviewed            Data Review:    Review and/or order of clinical lab test      Labs:     Recent Labs     08/12/19  0412   WBC 6.5   HGB 12.1   HCT 37.1        Recent Labs     08/12/19  0412      K 3.8   CL 98   CO2 33*   BUN 3*   CREA 0.63*   *   CA 9.0   MG 1.5*   PHOS 3.1     Recent Labs     08/12/19  0412   SGOT 11*   ALT 17   AP 89   TBILI 0.4   TP 6.5   ALB 2.8*   GLOB 3.7     No results for input(s): INR, PTP, APTT in the last 72 hours. No lab exists for component: INREXT, INREXT   No results for input(s): FE, TIBC, PSAT, FERR in the last 72 hours.    Lab Results   Component Value Date/Time    Folate 10.0 01/07/2016 08:41 AM      No results for input(s): PH, PCO2, PO2 in the last 72 hours. No results for input(s): CPK, CKNDX, TROIQ in the last 72 hours.     No lab exists for component: CPKMB  No results found for: CHOL, CHOLX, CHLST, CHOLV, HDL, LDL, LDLC, DLDLP, TGLX, TRIGL, TRIGP, CHHD, CHHDX  No results found for: [facility]  Lab Results   Component Value Date/Time    Color YELLOW/STRAW 06/07/2018 09:50 AM    Appearance CLEAR 06/07/2018 09:50 AM    Specific gravity >1.030 (H) 06/07/2018 09:50 AM    Specific gravity >1.030 (H) 06/07/2018 09:50 AM    pH (UA) 5.5 06/07/2018 09:50 AM    Protein NEGATIVE  06/07/2018 09:50 AM    Glucose NEGATIVE  06/07/2018 09:50 AM    Ketone NEGATIVE  06/07/2018 09:50 AM    Bilirubin NEGATIVE  06/07/2018 09:50 AM    Urobilinogen 0.2 06/07/2018 09:50 AM    Nitrites NEGATIVE  06/07/2018 09:50 AM    Leukocyte Esterase NEGATIVE  06/07/2018 09:50 AM    Epithelial cells FEW 06/07/2018 09:50 AM    Bacteria NEGATIVE  06/07/2018 09:50 AM    WBC 0-4 06/07/2018 09:50 AM    RBC 0-5 06/07/2018 09:50 AM         Medications Reviewed:     Current Facility-Administered Medications   Medication Dose Route Frequency    dextrose 5% - 0.2% NaCl with KCl 20 mEq/L infusion  75 mL/hr IntraVENous CONTINUOUS    pantoprazole (PROTONIX) 40 mg in sodium chloride 0.9% 10 mL injection  40 mg IntraVENous Q12H    sucralfate (CARAFATE) tablet 1 g  1 g Oral AC&HS    morphine injection 2 mg  2 mg IntraVENous Q3H PRN    sodium chloride (NS) flush 5-40 mL  5-40 mL IntraVENous Q8H    sodium chloride (NS) flush 5-40 mL  5-40 mL IntraVENous PRN    acetaminophen (TYLENOL) tablet 650 mg  650 mg Oral Q4H PRN    HYDROcodone-acetaminophen (NORCO) 5-325 mg per tablet 1 Tab  1 Tab Oral Q4H PRN    hydrALAZINE (APRESOLINE) 20 mg/mL injection 20 mg  20 mg IntraVENous Q6H PRN    albuterol-ipratropium (DUO-NEB) 2.5 MG-0.5 MG/3 ML  3 mL Nebulization Q6H PRN    ondansetron (ZOFRAN) injection 4 mg  4 mg IntraVENous Q4H PRN ______________________________________________________________________  EXPECTED LENGTH OF STAY: 3d Katharine Bun, MD

## 2019-08-13 NOTE — PROGRESS NOTES
Problem: Falls - Risk of  Goal: *Absence of Falls  Description  Document Madhavi Lewis Fall Risk and appropriate interventions in the flowsheet.   Outcome: Progressing Towards Goal  Note:   Fall Risk Interventions:  Mobility Interventions: Communicate number of staff needed for ambulation/transfer         Medication Interventions: Evaluate medications/consider consulting pharmacy         History of Falls Interventions: Bed/chair exit alarm

## 2019-08-14 ENCOUNTER — APPOINTMENT (OUTPATIENT)
Dept: GENERAL RADIOLOGY | Age: 59
DRG: 240 | End: 2019-08-14
Attending: SURGERY
Payer: MEDICAID

## 2019-08-14 ENCOUNTER — ANESTHESIA (OUTPATIENT)
Dept: SURGERY | Age: 59
DRG: 240 | End: 2019-08-14
Payer: MEDICAID

## 2019-08-14 ENCOUNTER — ANESTHESIA EVENT (OUTPATIENT)
Dept: SURGERY | Age: 59
DRG: 240 | End: 2019-08-14
Payer: MEDICAID

## 2019-08-14 PROCEDURE — 74011250636 HC RX REV CODE- 250/636: Performed by: NURSE ANESTHETIST, CERTIFIED REGISTERED

## 2019-08-14 PROCEDURE — C9113 INJ PANTOPRAZOLE SODIUM, VIA: HCPCS | Performed by: INTERNAL MEDICINE

## 2019-08-14 PROCEDURE — 77030031139 HC SUT VCRL2 J&J -A: Performed by: SURGERY

## 2019-08-14 PROCEDURE — 74011000250 HC RX REV CODE- 250: Performed by: INTERNAL MEDICINE

## 2019-08-14 PROCEDURE — 74011000250 HC RX REV CODE- 250: Performed by: NURSE ANESTHETIST, CERTIFIED REGISTERED

## 2019-08-14 PROCEDURE — 76210000016 HC OR PH I REC 1 TO 1.5 HR: Performed by: SURGERY

## 2019-08-14 PROCEDURE — 0DJ08ZZ INSPECTION OF UPPER INTESTINAL TRACT, VIA NATURAL OR ARTIFICIAL OPENING ENDOSCOPIC: ICD-10-PCS | Performed by: SURGERY

## 2019-08-14 PROCEDURE — 74011250636 HC RX REV CODE- 250/636: Performed by: HOSPITALIST

## 2019-08-14 PROCEDURE — 65270000032 HC RM SEMIPRIVATE

## 2019-08-14 PROCEDURE — 74011636320 HC RX REV CODE- 636/320: Performed by: SURGERY

## 2019-08-14 PROCEDURE — 74011000250 HC RX REV CODE- 250: Performed by: SURGERY

## 2019-08-14 PROCEDURE — 74018 RADEX ABDOMEN 1 VIEW: CPT

## 2019-08-14 PROCEDURE — 77030018836 HC SOL IRR NACL ICUM -A: Performed by: SURGERY

## 2019-08-14 PROCEDURE — 74011250636 HC RX REV CODE- 250/636: Performed by: INTERNAL MEDICINE

## 2019-08-14 PROCEDURE — BD12YZZ FLUOROSCOPY OF STOMACH USING OTHER CONTRAST: ICD-10-PCS | Performed by: SURGERY

## 2019-08-14 PROCEDURE — C1729 CATH, DRAINAGE: HCPCS | Performed by: SURGERY

## 2019-08-14 PROCEDURE — 76060000035 HC ANESTHESIA 2 TO 2.5 HR: Performed by: SURGERY

## 2019-08-14 PROCEDURE — 76010000131 HC OR TIME 2 TO 2.5 HR: Performed by: SURGERY

## 2019-08-14 PROCEDURE — 74011250637 HC RX REV CODE- 250/637: Performed by: INTERNAL MEDICINE

## 2019-08-14 PROCEDURE — 74011250636 HC RX REV CODE- 250/636: Performed by: PHYSICAL MEDICINE & REHABILITATION

## 2019-08-14 PROCEDURE — P9045 ALBUMIN (HUMAN), 5%, 250 ML: HCPCS | Performed by: NURSE ANESTHETIST, CERTIFIED REGISTERED

## 2019-08-14 PROCEDURE — 77030011640 HC PAD GRND REM COVD -A: Performed by: SURGERY

## 2019-08-14 PROCEDURE — 74011000258 HC RX REV CODE- 258: Performed by: NURSE ANESTHETIST, CERTIFIED REGISTERED

## 2019-08-14 PROCEDURE — 77030002933 HC SUT MCRYL J&J -A: Performed by: SURGERY

## 2019-08-14 PROCEDURE — 77030002996 HC SUT SLK J&J -A: Performed by: SURGERY

## 2019-08-14 PROCEDURE — 77030006565 HC BG DRN BILE BARD -A: Performed by: SURGERY

## 2019-08-14 RX ORDER — SODIUM CHLORIDE 0.9 % (FLUSH) 0.9 %
5-40 SYRINGE (ML) INJECTION EVERY 8 HOURS
Status: DISCONTINUED | OUTPATIENT
Start: 2019-08-14 | End: 2019-08-14 | Stop reason: HOSPADM

## 2019-08-14 RX ORDER — NEOSTIGMINE METHYLSULFATE 1 MG/ML
INJECTION INTRAVENOUS AS NEEDED
Status: DISCONTINUED | OUTPATIENT
Start: 2019-08-14 | End: 2019-08-14

## 2019-08-14 RX ORDER — ROCURONIUM BROMIDE 10 MG/ML
INJECTION, SOLUTION INTRAVENOUS AS NEEDED
Status: DISCONTINUED | OUTPATIENT
Start: 2019-08-14 | End: 2019-08-14 | Stop reason: HOSPADM

## 2019-08-14 RX ORDER — MORPHINE SULFATE 2 MG/ML
2 INJECTION, SOLUTION INTRAMUSCULAR; INTRAVENOUS
Status: DISCONTINUED | OUTPATIENT
Start: 2019-08-14 | End: 2019-08-14 | Stop reason: HOSPADM

## 2019-08-14 RX ORDER — PHENYLEPHRINE HCL IN 0.9% NACL 0.4MG/10ML
SYRINGE (ML) INTRAVENOUS AS NEEDED
Status: DISCONTINUED | OUTPATIENT
Start: 2019-08-14 | End: 2019-08-14 | Stop reason: HOSPADM

## 2019-08-14 RX ORDER — GLYCOPYRROLATE 0.2 MG/ML
INJECTION INTRAMUSCULAR; INTRAVENOUS AS NEEDED
Status: DISCONTINUED | OUTPATIENT
Start: 2019-08-14 | End: 2019-08-14

## 2019-08-14 RX ORDER — DEXMEDETOMIDINE HYDROCHLORIDE 100 UG/ML
INJECTION, SOLUTION INTRAVENOUS AS NEEDED
Status: DISCONTINUED | OUTPATIENT
Start: 2019-08-14 | End: 2019-08-14 | Stop reason: HOSPADM

## 2019-08-14 RX ORDER — SODIUM CHLORIDE, SODIUM LACTATE, POTASSIUM CHLORIDE, CALCIUM CHLORIDE 600; 310; 30; 20 MG/100ML; MG/100ML; MG/100ML; MG/100ML
INJECTION, SOLUTION INTRAVENOUS
Status: DISCONTINUED | OUTPATIENT
Start: 2019-08-14 | End: 2019-08-14 | Stop reason: HOSPADM

## 2019-08-14 RX ORDER — BUPIVACAINE HYDROCHLORIDE 2.5 MG/ML
INJECTION, SOLUTION EPIDURAL; INFILTRATION; INTRACAUDAL AS NEEDED
Status: DISCONTINUED | OUTPATIENT
Start: 2019-08-14 | End: 2019-08-14 | Stop reason: HOSPADM

## 2019-08-14 RX ORDER — DIPHENHYDRAMINE HYDROCHLORIDE 50 MG/ML
INJECTION, SOLUTION INTRAMUSCULAR; INTRAVENOUS AS NEEDED
Status: DISCONTINUED | OUTPATIENT
Start: 2019-08-14 | End: 2019-08-14 | Stop reason: HOSPADM

## 2019-08-14 RX ORDER — SUCCINYLCHOLINE CHLORIDE 20 MG/ML
INJECTION INTRAMUSCULAR; INTRAVENOUS AS NEEDED
Status: DISCONTINUED | OUTPATIENT
Start: 2019-08-14 | End: 2019-08-14 | Stop reason: HOSPADM

## 2019-08-14 RX ORDER — LANOLIN ALCOHOL/MO/W.PET/CERES
100 CREAM (GRAM) TOPICAL DAILY
Status: DISCONTINUED | OUTPATIENT
Start: 2019-08-14 | End: 2019-08-20 | Stop reason: HOSPADM

## 2019-08-14 RX ORDER — HYDROMORPHONE HYDROCHLORIDE 1 MG/ML
0.2 INJECTION, SOLUTION INTRAMUSCULAR; INTRAVENOUS; SUBCUTANEOUS
Status: DISCONTINUED | OUTPATIENT
Start: 2019-08-14 | End: 2019-08-14 | Stop reason: HOSPADM

## 2019-08-14 RX ORDER — LIDOCAINE HYDROCHLORIDE 20 MG/ML
INJECTION, SOLUTION EPIDURAL; INFILTRATION; INTRACAUDAL; PERINEURAL AS NEEDED
Status: DISCONTINUED | OUTPATIENT
Start: 2019-08-14 | End: 2019-08-14 | Stop reason: HOSPADM

## 2019-08-14 RX ORDER — FENTANYL CITRATE 50 UG/ML
INJECTION, SOLUTION INTRAMUSCULAR; INTRAVENOUS AS NEEDED
Status: DISCONTINUED | OUTPATIENT
Start: 2019-08-14 | End: 2019-08-14 | Stop reason: HOSPADM

## 2019-08-14 RX ORDER — EPHEDRINE SULFATE/0.9% NACL/PF 50 MG/5 ML
SYRINGE (ML) INTRAVENOUS AS NEEDED
Status: DISCONTINUED | OUTPATIENT
Start: 2019-08-14 | End: 2019-08-14 | Stop reason: HOSPADM

## 2019-08-14 RX ORDER — FENTANYL CITRATE 50 UG/ML
25 INJECTION, SOLUTION INTRAMUSCULAR; INTRAVENOUS
Status: DISCONTINUED | OUTPATIENT
Start: 2019-08-14 | End: 2019-08-14 | Stop reason: HOSPADM

## 2019-08-14 RX ORDER — PROPOFOL 10 MG/ML
INJECTION, EMULSION INTRAVENOUS AS NEEDED
Status: DISCONTINUED | OUTPATIENT
Start: 2019-08-14 | End: 2019-08-14 | Stop reason: HOSPADM

## 2019-08-14 RX ORDER — SODIUM CHLORIDE 0.9 % (FLUSH) 0.9 %
5-40 SYRINGE (ML) INJECTION AS NEEDED
Status: DISCONTINUED | OUTPATIENT
Start: 2019-08-14 | End: 2019-08-14 | Stop reason: HOSPADM

## 2019-08-14 RX ORDER — ONDANSETRON 2 MG/ML
4 INJECTION INTRAMUSCULAR; INTRAVENOUS AS NEEDED
Status: DISCONTINUED | OUTPATIENT
Start: 2019-08-14 | End: 2019-08-14 | Stop reason: HOSPADM

## 2019-08-14 RX ORDER — THERA TABS 400 MCG
1 TAB ORAL DAILY
Status: DISCONTINUED | OUTPATIENT
Start: 2019-08-15 | End: 2019-08-20 | Stop reason: HOSPADM

## 2019-08-14 RX ORDER — DEXAMETHASONE SODIUM PHOSPHATE 4 MG/ML
INJECTION, SOLUTION INTRA-ARTICULAR; INTRALESIONAL; INTRAMUSCULAR; INTRAVENOUS; SOFT TISSUE AS NEEDED
Status: DISCONTINUED | OUTPATIENT
Start: 2019-08-14 | End: 2019-08-14 | Stop reason: HOSPADM

## 2019-08-14 RX ORDER — ONDANSETRON 2 MG/ML
INJECTION INTRAMUSCULAR; INTRAVENOUS AS NEEDED
Status: DISCONTINUED | OUTPATIENT
Start: 2019-08-14 | End: 2019-08-14 | Stop reason: HOSPADM

## 2019-08-14 RX ORDER — ALBUMIN HUMAN 50 G/1000ML
SOLUTION INTRAVENOUS AS NEEDED
Status: DISCONTINUED | OUTPATIENT
Start: 2019-08-14 | End: 2019-08-14 | Stop reason: HOSPADM

## 2019-08-14 RX ADMIN — MORPHINE SULFATE 2 MG: 2 INJECTION, SOLUTION INTRAMUSCULAR; INTRAVENOUS at 18:16

## 2019-08-14 RX ADMIN — ONDANSETRON HYDROCHLORIDE 4 MG: 2 INJECTION, SOLUTION INTRAMUSCULAR; INTRAVENOUS at 17:15

## 2019-08-14 RX ADMIN — SUCRALFATE 1 G: 1 TABLET ORAL at 21:47

## 2019-08-14 RX ADMIN — Medication 10 ML: at 08:50

## 2019-08-14 RX ADMIN — Medication 80 MCG: at 15:56

## 2019-08-14 RX ADMIN — Medication 80 MCG: at 16:33

## 2019-08-14 RX ADMIN — SODIUM CHLORIDE 40 MG: 9 INJECTION INTRAMUSCULAR; INTRAVENOUS; SUBCUTANEOUS at 08:50

## 2019-08-14 RX ADMIN — Medication 10 MG: at 16:03

## 2019-08-14 RX ADMIN — ROCURONIUM BROMIDE 10 MG: 10 SOLUTION INTRAVENOUS at 15:59

## 2019-08-14 RX ADMIN — Medication 120 MCG: at 16:04

## 2019-08-14 RX ADMIN — FENTANYL CITRATE 50 MCG: 50 INJECTION, SOLUTION INTRAMUSCULAR; INTRAVENOUS at 18:03

## 2019-08-14 RX ADMIN — Medication 10 MG: at 16:08

## 2019-08-14 RX ADMIN — ROCURONIUM BROMIDE 10 MG: 10 SOLUTION INTRAVENOUS at 15:56

## 2019-08-14 RX ADMIN — Medication 80 MCG: at 15:57

## 2019-08-14 RX ADMIN — PROPOFOL 150 MG: 10 INJECTION, EMULSION INTRAVENOUS at 15:56

## 2019-08-14 RX ADMIN — CEFOTETAN DISODIUM 2 G: 2 INJECTION, POWDER, FOR SOLUTION INTRAMUSCULAR; INTRAVENOUS at 16:23

## 2019-08-14 RX ADMIN — SUCCINYLCHOLINE CHLORIDE 160 MG: 20 INJECTION, SOLUTION INTRAMUSCULAR; INTRAVENOUS at 15:56

## 2019-08-14 RX ADMIN — MORPHINE SULFATE 2 MG: 2 INJECTION, SOLUTION INTRAMUSCULAR; INTRAVENOUS at 21:46

## 2019-08-14 RX ADMIN — MORPHINE SULFATE 2 MG: 2 INJECTION, SOLUTION INTRAMUSCULAR; INTRAVENOUS at 12:37

## 2019-08-14 RX ADMIN — DEXTROSE MONOHYDRATE, SODIUM CHLORIDE, AND POTASSIUM CHLORIDE 75 ML/HR: 50; 2.25; 1.49 INJECTION, SOLUTION INTRAVENOUS at 08:40

## 2019-08-14 RX ADMIN — DEXMEDETOMIDINE HYDROCHLORIDE 4 MCG: 100 INJECTION, SOLUTION, CONCENTRATE INTRAVENOUS at 17:11

## 2019-08-14 RX ADMIN — DEXMEDETOMIDINE HYDROCHLORIDE 4 MCG: 100 INJECTION, SOLUTION, CONCENTRATE INTRAVENOUS at 16:55

## 2019-08-14 RX ADMIN — FENTANYL CITRATE 50 MCG: 50 INJECTION, SOLUTION INTRAMUSCULAR; INTRAVENOUS at 15:56

## 2019-08-14 RX ADMIN — MORPHINE SULFATE 2 MG: 2 INJECTION, SOLUTION INTRAMUSCULAR; INTRAVENOUS at 04:32

## 2019-08-14 RX ADMIN — PHENYLEPHRINE HYDROCHLORIDE 40 MCG/MIN: 10 INJECTION INTRAVENOUS at 16:09

## 2019-08-14 RX ADMIN — ALBUMIN (HUMAN) 250 ML: 12.5 INJECTION, SOLUTION INTRAVENOUS at 16:07

## 2019-08-14 RX ADMIN — DIPHENHYDRAMINE HYDROCHLORIDE 25 MG: 50 INJECTION, SOLUTION INTRAMUSCULAR; INTRAVENOUS at 18:03

## 2019-08-14 RX ADMIN — DEXAMETHASONE SODIUM PHOSPHATE 4 MG: 4 INJECTION, SOLUTION INTRAMUSCULAR; INTRAVENOUS at 16:09

## 2019-08-14 RX ADMIN — SODIUM CHLORIDE, POTASSIUM CHLORIDE, SODIUM LACTATE AND CALCIUM CHLORIDE: 600; 310; 30; 20 INJECTION, SOLUTION INTRAVENOUS at 15:36

## 2019-08-14 RX ADMIN — MORPHINE SULFATE 2 MG: 2 INJECTION, SOLUTION INTRAMUSCULAR; INTRAVENOUS at 18:11

## 2019-08-14 RX ADMIN — MORPHINE SULFATE 2 MG: 2 INJECTION, SOLUTION INTRAMUSCULAR; INTRAVENOUS at 08:50

## 2019-08-14 RX ADMIN — DEXMEDETOMIDINE HYDROCHLORIDE 4 MCG: 100 INJECTION, SOLUTION, CONCENTRATE INTRAVENOUS at 17:06

## 2019-08-14 RX ADMIN — MORPHINE SULFATE 2 MG: 2 INJECTION, SOLUTION INTRAMUSCULAR; INTRAVENOUS at 18:24

## 2019-08-14 RX ADMIN — Medication 10 ML: at 04:33

## 2019-08-14 RX ADMIN — LIDOCAINE HYDROCHLORIDE 80 MG: 20 INJECTION, SOLUTION EPIDURAL; INFILTRATION; INTRACAUDAL; PERINEURAL at 15:56

## 2019-08-14 RX ADMIN — Medication 5 MG: at 16:29

## 2019-08-14 RX ADMIN — Medication 10 ML: at 21:47

## 2019-08-14 RX ADMIN — SODIUM CHLORIDE 40 MG: 9 INJECTION INTRAMUSCULAR; INTRAVENOUS; SUBCUTANEOUS at 21:46

## 2019-08-14 RX ADMIN — MORPHINE SULFATE 2 MG: 2 INJECTION, SOLUTION INTRAMUSCULAR; INTRAVENOUS at 18:40

## 2019-08-14 RX ADMIN — Medication 80 MCG: at 15:58

## 2019-08-14 RX ADMIN — MORPHINE SULFATE 2 MG: 2 INJECTION, SOLUTION INTRAMUSCULAR; INTRAVENOUS at 18:34

## 2019-08-14 RX ADMIN — PROPOFOL 100 MG: 10 INJECTION, EMULSION INTRAVENOUS at 16:55

## 2019-08-14 RX ADMIN — DEXMEDETOMIDINE HYDROCHLORIDE 4 MCG: 100 INJECTION, SOLUTION, CONCENTRATE INTRAVENOUS at 16:49

## 2019-08-14 NOTE — BRIEF OP NOTE
BRIEF OPERATIVE NOTE    Date of Procedure: 8/14/2019   Preoperative Diagnosis:  Dysphagia. Postoperative Diagnosis:  Same. Procedure(s): Insert Gastrostomy Tube. Intra Operative Fluoroscopy. Surgeon(s) and Role:   Kasia Sierra MD - Primary  Surgical Staff:  Circ-1: Carleen Garcia  Scrub Tech-1: Cherylene Potter, Myrick Hasten  Surg Asst-1: [place] Key  Event Time In Time Out   Incision Start 08/14/2019 1623    Incision Close       Anesthesia: General   Estimated Blood Loss: Approx. 10cc. Specimens: * No specimens in log *   Findings: 16 [place]amanian feeding gastrostomy. Complications: None.   Implants: * No implants in log *

## 2019-08-14 NOTE — PALLIATIVE CARE
Palliative Medicine Social Work      Chart reviewed and note plans for PEG placement today. Goals clear after placement for Home Hospice. Would consider [facility]/Hospice consult at time of discharge. Patient wants an opportunity to talk to his wife about hospice before he it is arranged. Thank you for the opportunity to be involved in the care of Mr. Dasia Davis. Bettie Bartlett, JAZMINEW, [facility]-  Palliative Medicine   Respecting Choices ® ACP Facilitator   [phone]

## 2019-08-14 NOTE — PROGRESS NOTES
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 He stated that his swallowing progressively worse for the last three weeks and has lost 15 pounds over three weeks. No fevers, chills, sweating, left side chest pain, palpitation, shortness of breath, abdominal pain, urinary complaint, or abnormal bowel movement. He has on and off intermittent dry cough. No lower extremity swelling. The patient does not want any further chemoradiation and has DNR. He states he had a PEG and a tracheostomy in . Interval history / Subjective:   F/y Dysphagia  Failed PEG placement due to severe stricture  G tube could not be placed by IR   Plan for J tube , the patient aware  Right arm swelling  Not able to swallow  Pain controlled with the meds     Assessment & Plan:      Dysphagia, secondary to esophageal stricture. -CXR empysema  -CT Neck  New masslike circumferential wall thickening of the lower cervical and upper  thoracic esophagus as described above, with severe narrowing/near occlusion of the esophagus at the level of the thoracic inlet, highly suspicious for esophageal carcinoma. Endoscopy is recommended. Area of irregular heterogeneous enhancement in the anterior supraglottic larynx measuring 1.0 x 0.6 cm, which may represent treatment-related changes or tumor recurrence. No evidence of cervical lymphadenopathy.   Severe calcific atherosclerosis of the carotid bifurcations with at least mild stenosis of the proximal bilateral internal carotid arteries. Moderate centrilobular emphysema. Spiculated subpleural nodular opacity in the right upper lobe measuring 10 mm, which appears stable. follow-up chest CT in 6-12 months is recommended. New mild superior endplate compression deformities of the T4-T6 vertebral bodies since 2015, though these appear to be chronic. Chronic left mandibular condyle fracture  -NPO, IVF  -seen by ST for swallow eval but deemed not safe to test.s/o as dysphagia sec to esophageal cancer recurrence  -failed attempt to pass through the stricture, J tube scheduled for 8/14. Will start tube feeds when cleared by Surgery    Esophageal mass, with history of base of tongue cancer with possible metastasis  -history of radiation and chemotherapy, and he had history of tracheostomy and PEG tube, which he has used for eight months. The patient does not want any further chemo or radiation treatment.    -Please see Palliative note: Patient clear he does not want oncology or radiation oncology. The patient is ok with PEG tube  -prev under onc [facility] Julia Hen[facility] but has not seen since 2014  -palliative care c/s. Pt is DNR    Right arm swelling  -likely sec to infiltration of fluids  -no need to look for DVT as would not change the management    Severe protein-calorie malnutrition, secondary to dysphagia and underlying malignancy. -GI and  Nutritionist following.  -to have peg 8/12 then to start TF  -will order Tf   Chronic T4, T6 compression deformity, stable.    -noted to be chronic findings on CT as above  - p.r.n. Tylenol and morphine. COPD, stable.    -normal O2 sat on room air.   p.r.n. DuoNebs    Hypertension.    -p.r.n. IV hydralazine. Hypokalemia  -replete with ivf     DVT prophylaxis. Heparin.  On hold awaiting peg.scd  Code:DNR   Functional status: independent with walker    Plan: J tube to be placed today    Care Plan discussed with: Patient/Family  Disposition: TBD     Hospital Problems  Date Reviewed: 8/12/2019          Codes Class Noted POA    * (Principal) Dysphagia ICD-10-CM: R13.10  ICD-9-CM: 787.20  8/7/2019 Unknown                Review of Systems:   A comprehensive review of systems was negative except for that written in the HPI. Vital Signs:    Last 24hrs VS reviewed since prior progress note. Most recent are:  Visit Vitals  BP (!) 139/96 (BP 1 Location: Right arm, BP Patient Position: At rest)   Pulse 96   Temp 97.9 °F (36.6 °C)   Resp 18   Ht 5' 5.98\" (1.676 m)   Wt 47.5 kg (104 lb 12.8 oz)   SpO2 98%   BMI 16.92 kg/m²         Intake/Output Summary (Last 24 hours) at 8/14/2019 0910  Last data filed at 8/14/2019 0841  Gross per 24 hour   Intake 900 ml   Output 1950 ml   Net -1050 ml        Physical Examination:             Constitutional:  No acute distress, cooperative, pleasant ,cachectic. Speech impaired from prior radiation   ENT:  Oral mucous moist, oropharynx benign. Neck supple,    Resp:  CTA bilaterally. No wheezing/rhonchi/rales. No accessory muscle use   CV:  Regular rhythm, normal rate, no murmurs, gallops, rubs    GI:  Soft, non distended, non tender. normoactive bowel sounds, no hepatosplenomegaly     Musculoskeletal:  No edema, warm, 2+ pulses throughout    Neurologic:  Moves all extremities. AAOx3, CN II-XII reviewed            Data Review:    Review and/or order of clinical lab test      Labs:     Recent Labs     08/12/19 0412   WBC 6.5   HGB 12.1   HCT 37.1        Recent Labs     08/12/19  0412      K 3.8   CL 98   CO2 33*   BUN 3*   CREA 0.63*   *   CA 9.0   MG 1.5*   PHOS 3.1     Recent Labs     08/12/19 0412   SGOT 11*   ALT 17   AP 89   TBILI 0.4   TP 6.5   ALB 2.8*   GLOB 3.7     No results for input(s): INR, PTP, APTT in the last 72 hours. No lab exists for component: INREXT, INREXT   No results for input(s): FE, TIBC, PSAT, FERR in the last 72 hours.    Lab Results   Component Value Date/Time    Folate 10.0 01/07/2016 08:41 AM      No results for input(s): PH, PCO2, PO2 in the last 72 hours. No results for input(s): CPK, CKNDX, TROIQ in the last 72 hours.     No lab exists for component: CPKMB  No results found for: CHOL, CHOLX, CHLST, CHOLV, HDL, LDL, LDLC, DLDLP, TGLX, TRIGL, TRIGP, CHHD, CHHDX  No results found for: [facility]  Lab Results   Component Value Date/Time    Color YELLOW/STRAW 06/07/2018 09:50 AM    Appearance CLEAR 06/07/2018 09:50 AM    Specific gravity >1.030 (H) 06/07/2018 09:50 AM    Specific gravity >1.030 (H) 06/07/2018 09:50 AM    pH (UA) 5.5 06/07/2018 09:50 AM    Protein NEGATIVE  06/07/2018 09:50 AM    Glucose NEGATIVE  06/07/2018 09:50 AM    Ketone NEGATIVE  06/07/2018 09:50 AM    Bilirubin NEGATIVE  06/07/2018 09:50 AM    Urobilinogen 0.2 06/07/2018 09:50 AM    Nitrites NEGATIVE  06/07/2018 09:50 AM    Leukocyte Esterase NEGATIVE  06/07/2018 09:50 AM    Epithelial cells FEW 06/07/2018 09:50 AM    Bacteria NEGATIVE  06/07/2018 09:50 AM    WBC 0-4 06/07/2018 09:50 AM    RBC 0-5 06/07/2018 09:50 AM         Medications Reviewed:     Current Facility-Administered Medications   Medication Dose Route Frequency    dextrose 5% - 0.2% NaCl with KCl 20 mEq/L infusion  75 mL/hr IntraVENous CONTINUOUS    pantoprazole (PROTONIX) 40 mg in sodium chloride 0.9% 10 mL injection  40 mg IntraVENous Q12H    sucralfate (CARAFATE) tablet 1 g  1 g Oral AC&HS    morphine injection 2 mg  2 mg IntraVENous Q3H PRN    sodium chloride (NS) flush 5-40 mL  5-40 mL IntraVENous Q8H    sodium chloride (NS) flush 5-40 mL  5-40 mL IntraVENous PRN    acetaminophen (TYLENOL) tablet 650 mg  650 mg Oral Q4H PRN    HYDROcodone-acetaminophen (NORCO) 5-325 mg per tablet 1 Tab  1 Tab Oral Q4H PRN    hydrALAZINE (APRESOLINE) 20 mg/mL injection 20 mg  20 mg IntraVENous Q6H PRN    albuterol-ipratropium (DUO-NEB) 2.5 MG-0.5 MG/3 ML  3 mL Nebulization Q6H PRN    ondansetron (ZOFRAN) injection 4 mg  4 mg IntraVENous Q4H PRN     ______________________________________________________________________  EXPECTED LENGTH OF STAY: 3d Kenisha Members, MD

## 2019-08-14 NOTE — ANESTHESIA POSTPROCEDURE EVALUATION
Procedure(s):  INSERTION GASTROSTOMY TUBE. general    Anesthesia Post Evaluation      Multimodal analgesia: multimodal analgesia used between 6 hours prior to anesthesia start to PACU discharge  Patient location during evaluation: PACU  Patient participation: complete - patient participated  Level of consciousness: awake  Pain score: 2  Pain management: adequate  Airway patency: patent  Anesthetic complications: no  Cardiovascular status: acceptable  Respiratory status: acceptable  Hydration status: acceptable  Comments: I have evaluated the patient and meets criteria for discharge from PACU. Shadi Edmonds MD  Post anesthesia nausea and vomiting:  controlled      Vitals Value Taken Time   /78 8/14/2019  7:15 PM   Temp 36.7 °C (98 °F) 8/14/2019  7:00 PM   Pulse 86 8/14/2019  7:26 PM   Resp 11 8/14/2019  7:26 PM   SpO2 100 % 8/14/2019  7:26 PM   Vitals shown include unvalidated device data.

## 2019-08-14 NOTE — PERIOP NOTES
TRANSFER - IN REPORT:    Verbal report received from MARY Vaughan RN(name) on Latisha   being received from 6E(unit) for ordered procedure      Report consisted of patients Situation, Background, Assessment and   Recommendations(SBAR). Information from the following report(s) SBAR and MAR was reviewed with the receiving nurse. Opportunity for questions and clarification was provided. Assessment completed upon patients arrival to unit and care assumed.

## 2019-08-14 NOTE — PROGRESS NOTES
Physical Therapy  8/14/2019    Pt cleared and received supine in bed. Awake and pleasant demeanor. Reported he would like to walk but at this time would prefer to sleep before G Tube placement scheduled for 1400 today. Will follow up next treatment day.      Thank Marian Victoria, PT, DPT
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
## 2019-08-14 NOTE — PROGRESS NOTES
TRANSFER - OUT REPORT:    Verbal report given to Radha Pulliam RN(name) on Citlalli Ortiz  being transferred to [facility](unit) for routine post - op       Report consisted of patients Situation, Background, Assessment and   Recommendations(SBAR). Time Pre op antibiotic given:  Anesthesia Stop time: 7684  Mar Present on Transfer to floor:n  Order for Mar on Chart:n  Discharge Prescriptions with Chart:n    Information from the following report(s) SBAR, Kardex, OR Summary, Procedure Summary, Intake/Output and MAR was reviewed with the receiving nurse. Opportunity for questions and clarification was provided. Is the patient on 02? n         Is the patient on a monitor? NO    Is the nurse transporting with the patient? NO    Surgical Waiting Area notified of patient's transfer from PACU? NO      The following personal items collected during your admission accompanied patient upon transfer:   Dental Appliance: Dental Appliances: None  Vision: Visual Aid: None  Hearing Aid:    Jewelry: Jewelry: None  Clothing: Clothing: (underwear and glasses to pacu)  Other Valuables:  Other Valuables: Cell Phone, Rigoberto Abts, At bedside  Valuables sent to safe:

## 2019-08-14 NOTE — ROUTINE PROCESS
Patient: Haylee Hatch MRN: [id number]  SSN: [id number] YOB: 1960  Age: 61 y.o. Sex: male Patient is status post Procedure(s): 
INSERTION GASTROSTOMY TUBE. Surgeon(s) and Role: 
   Massiel Glover MD - Primary Local/Dose/Irrigation:  Marcaine 0.25% 20ml; isovue 95ml Peripheral IV 08/14/19 Left;Posterior Forearm (Active) Site Assessment Clean, dry, & intact 8/14/2019  2:47 PM  
Phlebitis Assessment 0 8/14/2019  2:47 PM  
Infiltration Assessment 0 8/14/2019  2:47 PM  
Dressing Status New 8/14/2019  2:47 PM  
Dressing Type Transparent 8/14/2019  2:47 PM  
Hub Color/Line Status Green; Infusing 8/14/2019  2:47 PM  
      
PEG/Gastrostomy Tube 08/14/19 (Active) Site Assessment Clean, dry, & intact 8/14/2019  5:36 PM  
Dressing Status Clean, dry, & intact 8/14/2019  5:36 PM  
G Port Status Other(comment) 8/14/2019  5:36 PM  
  
Airway - Endotracheal Tube 08/14/19 Oral (Active) Dressing/Packing:  Wound Abdomen -Dressing Type: 4 x 4;Gauze;Special tape (comment); Topical skin adhesive/glue (08/14/19 0052) Splint/Cast:  ] Other:  gtube to bile bag

## 2019-08-14 NOTE — ANESTHESIA PREPROCEDURE EVALUATION
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 Previous trach which has been decannulated. Anesthetic History   No history of anesthetic complications            Review of Systems / Medical History  Patient summary reviewed, nursing notes reviewed and pertinent labs reviewed    Pulmonary    COPD            Comments: Previous trach. Neuro/Psych   Within defined limits           Cardiovascular    Hypertension                   GI/Hepatic/Renal           PUD     Endo/Other  Within defined limits           Other Findings              Physical Exam    Airway  Mallampati: I  TM Distance: 4 - 6 cm  Neck ROM: normal range of motion   Mouth opening: Normal     Cardiovascular    Rhythm: regular  Rate: normal         Dental    Dentition: Poor dentition     Pulmonary  Breath sounds clear to auscultation               Abdominal  Abdominal exam normal       Other Findings            Anesthetic Plan    ASA: 3  Anesthesia type: general          Induction: Intravenous  Anesthetic plan and risks discussed with: Patient      Plan for GETA, standard monitors.

## 2019-08-15 LAB
ANION GAP SERPL CALC-SCNC: 5 MMOL/L (ref 5–15)
BASOPHILS # BLD: 0 K/UL (ref 0–0.1)
BASOPHILS NFR BLD: 0 % (ref 0–1)
BUN SERPL-MCNC: 6 MG/DL (ref 6–20)
BUN/CREAT SERPL: 7 (ref 12–20)
CALCIUM SERPL-MCNC: 9.6 MG/DL (ref 8.5–10.1)
CHLORIDE SERPL-SCNC: 96 MMOL/L (ref 97–108)
CO2 SERPL-SCNC: 30 MMOL/L (ref 21–32)
CREAT SERPL-MCNC: 0.9 MG/DL (ref 0.7–1.3)
DIFFERENTIAL METHOD BLD: ABNORMAL
EOSINOPHIL # BLD: 0 K/UL (ref 0–0.4)
EOSINOPHIL NFR BLD: 0 % (ref 0–7)
ERYTHROCYTE [DISTWIDTH] IN BLOOD BY AUTOMATED COUNT: 13.6 % (ref 11.5–14.5)
GLUCOSE SERPL-MCNC: 173 MG/DL (ref 65–100)
HCT VFR BLD AUTO: 37.4 % (ref 36.6–50.3)
HGB BLD-MCNC: 12.2 G/DL (ref 12.1–17)
IMM GRANULOCYTES # BLD AUTO: 0 K/UL (ref 0–0.04)
IMM GRANULOCYTES NFR BLD AUTO: 0 % (ref 0–0.5)
LYMPHOCYTES # BLD: 0.5 K/UL (ref 0.8–3.5)
LYMPHOCYTES NFR BLD: 6 % (ref 12–49)
MAGNESIUM SERPL-MCNC: 1.8 MG/DL (ref 1.6–2.4)
MCH RBC QN AUTO: 28 PG (ref 26–34)
MCHC RBC AUTO-ENTMCNC: 32.6 G/DL (ref 30–36.5)
MCV RBC AUTO: 85.8 FL (ref 80–99)
MONOCYTES # BLD: 0.7 K/UL (ref 0–1)
MONOCYTES NFR BLD: 9 % (ref 5–13)
NEUTS SEG # BLD: 6.9 K/UL (ref 1.8–8)
NEUTS SEG NFR BLD: 85 % (ref 32–75)
NRBC # BLD: 0 K/UL (ref 0–0.01)
NRBC BLD-RTO: 0 PER 100 WBC
PHOSPHATE SERPL-MCNC: 3.9 MG/DL (ref 2.6–4.7)
PLATELET # BLD AUTO: 220 K/UL (ref 150–400)
PMV BLD AUTO: 11.5 FL (ref 8.9–12.9)
POTASSIUM SERPL-SCNC: 4.9 MMOL/L (ref 3.5–5.1)
RBC # BLD AUTO: 4.36 M/UL (ref 4.1–5.7)
RBC MORPH BLD: ABNORMAL
SODIUM SERPL-SCNC: 131 MMOL/L (ref 136–145)
WBC # BLD AUTO: 8.1 K/UL (ref 4.1–11.1)

## 2019-08-15 PROCEDURE — 83735 ASSAY OF MAGNESIUM: CPT

## 2019-08-15 PROCEDURE — 74011250637 HC RX REV CODE- 250/637: Performed by: HOSPITALIST

## 2019-08-15 PROCEDURE — 84100 ASSAY OF PHOSPHORUS: CPT

## 2019-08-15 PROCEDURE — 85025 COMPLETE CBC W/AUTO DIFF WBC: CPT

## 2019-08-15 PROCEDURE — 74011250637 HC RX REV CODE- 250/637: Performed by: SURGERY

## 2019-08-15 PROCEDURE — 65270000032 HC RM SEMIPRIVATE

## 2019-08-15 PROCEDURE — 80048 BASIC METABOLIC PNL TOTAL CA: CPT

## 2019-08-15 PROCEDURE — 74011250637 HC RX REV CODE- 250/637: Performed by: INTERNAL MEDICINE

## 2019-08-15 PROCEDURE — 74011250636 HC RX REV CODE- 250/636: Performed by: SURGERY

## 2019-08-15 PROCEDURE — 36415 COLL VENOUS BLD VENIPUNCTURE: CPT

## 2019-08-15 PROCEDURE — 74011000250 HC RX REV CODE- 250: Performed by: SURGERY

## 2019-08-15 PROCEDURE — 74011250636 HC RX REV CODE- 250/636: Performed by: PHYSICAL MEDICINE & REHABILITATION

## 2019-08-15 PROCEDURE — C9113 INJ PANTOPRAZOLE SODIUM, VIA: HCPCS | Performed by: SURGERY

## 2019-08-15 RX ORDER — LIDOCAINE HYDROCHLORIDE 10 MG/ML
0.1 INJECTION, SOLUTION EPIDURAL; INFILTRATION; INTRACAUDAL; PERINEURAL AS NEEDED
Status: DISCONTINUED | OUTPATIENT
Start: 2019-08-15 | End: 2019-08-15

## 2019-08-15 RX ORDER — SODIUM CHLORIDE, SODIUM LACTATE, POTASSIUM CHLORIDE, CALCIUM CHLORIDE 600; 310; 30; 20 MG/100ML; MG/100ML; MG/100ML; MG/100ML
50 INJECTION, SOLUTION INTRAVENOUS CONTINUOUS
Status: DISCONTINUED | OUTPATIENT
Start: 2019-08-15 | End: 2019-08-15

## 2019-08-15 RX ORDER — SODIUM CHLORIDE 0.9 % (FLUSH) 0.9 %
5-40 SYRINGE (ML) INJECTION AS NEEDED
Status: DISCONTINUED | OUTPATIENT
Start: 2019-08-15 | End: 2019-08-20 | Stop reason: HOSPADM

## 2019-08-15 RX ORDER — SODIUM CHLORIDE 0.9 % (FLUSH) 0.9 %
5-40 SYRINGE (ML) INJECTION EVERY 8 HOURS
Status: DISCONTINUED | OUTPATIENT
Start: 2019-08-15 | End: 2019-08-17

## 2019-08-15 RX ADMIN — SUCRALFATE 1 G: 1 TABLET ORAL at 07:11

## 2019-08-15 RX ADMIN — MORPHINE SULFATE 2 MG: 2 INJECTION, SOLUTION INTRAMUSCULAR; INTRAVENOUS at 02:35

## 2019-08-15 RX ADMIN — SUCRALFATE 1 G: 1 TABLET ORAL at 16:06

## 2019-08-15 RX ADMIN — MORPHINE SULFATE 2 MG: 2 INJECTION, SOLUTION INTRAMUSCULAR; INTRAVENOUS at 15:07

## 2019-08-15 RX ADMIN — Medication 10 ML: at 02:35

## 2019-08-15 RX ADMIN — SUCRALFATE 1 G: 1 TABLET ORAL at 11:59

## 2019-08-15 RX ADMIN — DEXTROSE MONOHYDRATE, SODIUM CHLORIDE, AND POTASSIUM CHLORIDE 75 ML/HR: 50; 2.25; 1.49 INJECTION, SOLUTION INTRAVENOUS at 23:20

## 2019-08-15 RX ADMIN — HYDROCODONE BITARTRATE AND ACETAMINOPHEN 1 TABLET: 5; 325 TABLET ORAL at 07:13

## 2019-08-15 RX ADMIN — Medication 10 ML: at 21:21

## 2019-08-15 RX ADMIN — Medication 10 ML: at 07:04

## 2019-08-15 RX ADMIN — MORPHINE SULFATE 2 MG: 2 INJECTION, SOLUTION INTRAMUSCULAR; INTRAVENOUS at 18:36

## 2019-08-15 RX ADMIN — MORPHINE SULFATE 2 MG: 2 INJECTION, SOLUTION INTRAMUSCULAR; INTRAVENOUS at 09:26

## 2019-08-15 RX ADMIN — MORPHINE SULFATE 2 MG: 2 INJECTION, SOLUTION INTRAMUSCULAR; INTRAVENOUS at 21:22

## 2019-08-15 RX ADMIN — DEXTROSE MONOHYDRATE, SODIUM CHLORIDE, AND POTASSIUM CHLORIDE 75 ML/HR: 50; 2.25; 1.49 INJECTION, SOLUTION INTRAVENOUS at 07:03

## 2019-08-15 RX ADMIN — SODIUM CHLORIDE 40 MG: 9 INJECTION INTRAMUSCULAR; INTRAVENOUS; SUBCUTANEOUS at 09:24

## 2019-08-15 RX ADMIN — THERA TABS 1 TABLET: TAB at 10:37

## 2019-08-15 RX ADMIN — Medication 100 MG: at 10:37

## 2019-08-15 RX ADMIN — SUCRALFATE 1 G: 1 TABLET ORAL at 21:22

## 2019-08-15 RX ADMIN — SODIUM CHLORIDE 40 MG: 9 INJECTION INTRAMUSCULAR; INTRAVENOUS; SUBCUTANEOUS at 21:17

## 2019-08-15 NOTE — PROGRESS NOTES
[employer] [facility]    POD #1 s/p open G-tube placement    Subjective     No events overnight. He describes some 'burning' in his abdomen but no real pain. Tube feeds started this morning and now up to 25/hr. He is tolerating this well. No n/v.      Objective     Patient Vitals for the past 24 hrs:   Temp Pulse Resp BP SpO2   08/15/19 1439 97.9 °F (36.6 °C) 79 18 160/89 99 %   08/15/19 0914 97.4 °F (36.3 °C) 86 18 115/72 97 %   08/15/19 0345 97.7 °F (36.5 °C) 79 16 138/83 97 %   08/14/19 1915  84 14 126/78 100 %   08/14/19 1900 98 °F (36.7 °C) 83 12 132/83 100 %   08/14/19 1845  81 16 141/77 100 %   08/14/19 1830  84 13 121/77 99 %   08/14/19 1825  83 15  99 %   08/14/19 1820  85 10  99 %   08/14/19 1815  85 18 127/81 98 %   08/14/19 1810  97 19 138/72 99 %   08/14/19 1809 98.1 °F (36.7 °C) 83 20 137/77 99 %   08/14/19 1805  99 24 137/77 96 %       Date 08/14/19 0700 - 08/15/19 0659 08/15/19 0700 - 08/16/19 0659   Shift 5273-6445 3444-2303 24 Hour Total 1022-4994 7474-4783 24 Hour Total   INTAKE   I.V.(mL/kg/hr) 1600(2.8)  1600(1.4)        Volume (lactated Ringers infusion) 700  700        Volume (dextrose 5% - 0.2% NaCl with KCl 20 mEq/L infusion) 900  900      NG/GT    120  120     Water Flush Volume (mL) (PEG/Gastrostomy Tube 08/14/19)    120  120   Shift Total(mL/kg) 1600(33.7)  1600(33.7) 120(2.5)  120(2.5)   OUTPUT   Urine(mL/kg/hr) 550(1) 1225(2.1) 1775(1.6) 1475  1475     Urine Voided 550 1225 1775 1475  1475     Urine Occurrence(s) 1 x  1 x      Shift Total(mL/kg) 550(11.6) 1225(25.8) 1775(37.3) 1475(31)  1475(31)   NET 1050 -1228 -175 -1355  -1355   Weight (kg) 47.5 47.5 47.5 47.5 47.5 47.5       PE  GEN - Awake, alert, communicating appropriately. NAD  Abd - soft, ND.  G-tube in place. No leakage around tube noted. No skin erythema noted.         Labs  Recent Results (from the past 24 hour(s))   METABOLIC PANEL, BASIC Collection Time: 08/15/19  3:55 AM   Result Value Ref Range    Sodium 131 (L) 136 - 145 mmol/L    Potassium 4.9 3.5 - 5.1 mmol/L    Chloride 96 (L) 97 - 108 mmol/L    CO2 30 21 - 32 mmol/L    Anion gap 5 5 - 15 mmol/L    Glucose 173 (H) 65 - 100 mg/dL    BUN 6 6 - 20 MG/DL    Creatinine 0.90 0.70 - 1.30 MG/DL    BUN/Creatinine ratio 7 (L) 12 - 20      GFR est AA >60 >60 ml/min/1.73m2    GFR est non-AA >60 >60 ml/min/1.73m2    Calcium 9.6 8.5 - 10.1 MG/DL   PHOSPHORUS    Collection Time: 08/15/19  3:55 AM   Result Value Ref Range    Phosphorus 3.9 2.6 - 4.7 MG/DL   MAGNESIUM    Collection Time: 08/15/19  3:55 AM   Result Value Ref Range    Magnesium 1.8 1.6 - 2.4 mg/dL   CBC WITH AUTOMATED DIFF    Collection Time: 08/15/19  3:55 AM   Result Value Ref Range    WBC 8.1 4.1 - 11.1 K/uL    RBC 4.36 4.10 - 5.70 M/uL    HGB 12.2 12.1 - 17.0 g/dL    HCT 37.4 36.6 - 50.3 %    MCV 85.8 80.0 - 99.0 FL    MCH 28.0 26.0 - 34.0 PG    MCHC 32.6 30.0 - 36.5 g/dL    RDW 13.6 11.5 - 14.5 %    PLATELET 135 058 - 365 K/uL    MPV 11.5 8.9 - 12.9 FL    NRBC 0.0 0  WBC    ABSOLUTE NRBC 0.00 0.00 - 0.01 K/uL    NEUTROPHILS 85 (H) 32 - 75 %    LYMPHOCYTES 6 (L) 12 - 49 %    MONOCYTES 9 5 - 13 %    EOSINOPHILS 0 0 - 7 %    BASOPHILS 0 0 - 1 %    IMMATURE GRANULOCYTES 0 0.0 - 0.5 %    ABS. NEUTROPHILS 6.9 1.8 - 8.0 K/UL    ABS. LYMPHOCYTES 0.5 (L) 0.8 - 3.5 K/UL    ABS. MONOCYTES 0.7 0.0 - 1.0 K/UL    ABS. EOSINOPHILS 0.0 0.0 - 0.4 K/UL    ABS. BASOPHILS 0.0 0.0 - 0.1 K/UL    ABS. IMM. GRANS. 0.0 0.00 - 0.04 K/UL    DF SMEAR SCANNED      RBC COMMENTS NORMOCYTIC, NORMOCHROMIC           Assessment     Karan Morelos is a 61 y. o.yr old male s/p open G-tube placement. Plan     Ok to advance tube feeds to goal as tolerated as recommended by RD. Surgery will sign off at this time. Please call back with further questions.       Delaney Sharp MD  8/15/2019  4:30 PM

## 2019-08-15 NOTE — PALLIATIVE CARE DISCHARGE
Goals of Care/Treatment Preferences The Palliative Medicine team was consulted as part of your/your loved one's care in the hospital. Our team is a supportive service; we strive to relieve suffering and improve quality of life. We reviewed advance care planning information, which includes the following: 
 
  Primary Decision Maker: Heber Napoles [facility] - Spouse - [phone] Confirm Advance Directive: None Patient Would Like to Complete Advance Directive: Yes Does the patient have other document types: Do Not Resuscitate Patient/Health Care Proxy Stated Goals: Comfort(To eat) We reviewed / discussed your code status as: DNR 
   Full Code means perform CPR in the event of cardiac arrest. 
    DNR means do NOT perform CPR in the event of cardiac arrest. 
    Partial Code means you have specific preferences, please discuss with your healthcare team. 
    Tono Cuevas means this issue was not addressed / resolved during your stay Medical Interventions: Limited additional interventions We talked about the new finding of esophageal mass that is most likely cancer. This mass, along with probable scarring from past radiation, has caused and esophageal stricture that makes swallowing challenging. You had a feeding tube placed to attend to your hunger. You shared your wishes to seek no further follow up for your cancer. You shared your desire to avoid any future hospitalizations. You also shared your desire to focus on comfort and having your pain managed at home; to allow \"nature to take it's course\" and die comfortably when the time comes. You signed a Durable Do Not Resuscitate (DDNR) order that will protect you from CPR, shock or being placed on a ventilator when your heart stops. We discussed home hospice as the best way to manage your symptoms at home and to meet your above stated goals.   You told us you wanted to talk to your wife about this first.  We encourage ongoing discussions once you're home. Your [facility] agency can easily transition you into hospice care. Because of the importance of this information, we are providing you with a printed copy to share with other healthcare providers after this hospitalization is complete.

## 2019-08-15 NOTE — PROGRESS NOTES
Care Management Interventions  PCP Verified by CM: Yes  Mode of Transport at Discharge: (pvt vehicle)  Transition of Care Consult (CM Consult): Discharge Planning  Discharge Durable Medical Equipment: No  Physical Therapy Consult: Yes  Occupational Therapy Consult: Yes  Speech Therapy Consult: Yes  Current Support Network: Lives with Spouse  Plan discussed with Pt/Family/Caregiver: Yes  Freedom of Choice Offered: Yes  Discharge Location  Discharge Placement: Home with home health and infusion services for g-tube feedings. Chart reviewed. Patient transferred here from [facility] with complaints of Sore Throat andand recent 15 lb weight loss. The patient has a past medical hx of Lung mass, Carcinoma of floor of mouth, Alcohol abuse, Hypertension, COPD,Tobacco abuse and Dysphagia    CM met with patient to introduce self and role in care transitions. CM discussed with patient plan for patient to return home with home health and infusion services for his G-Tube feedings. Freedom of choice offered and patient has no preference for agencies providing  [facility] or Infusion services. The patient resides in [place] with his wife Daniel Munguia). She is wheelchair bound. Patient reports being independent at home with a walker. Patient has a brother who lives in [place], [place]. He states he will call his brother to ask for transportation assistance (as he was brought here from [facility] by ambulance). CM will check on Medicaid cab transport to [place] which is approx 84 miles from [address] [facility]. Referral is being sent to [employer] to check insurance. CM will need specific orders for gastrostomy tube feedings. CM will continue to follow.   KE Martínez, CRM

## 2019-08-15 NOTE — PALLIATIVE CARE
Palliative Medicine Social Work      Palliative team will sign off for now. Goals are clear at discharge for [place] and transition into hospice when ready. DDNR on file. Thank you for the opportunity to be involved in the care of Mr. Isabel Thomas. Bettie Bartlett, JAZMINEW, [facility]-  Palliative Medicine   Respecting Choices ® ACP Facilitator   [phone]

## 2019-08-15 NOTE — PROGRESS NOTES
NUTRITION    Pt discussed again in rounds. TF started this AM, therefore labs from this morning are not reflective of response to feeding. RD will follow up again tomorrow regarding recommendations for TF advancement. RECOMMENDATIONS:     1. Continue Jevity 1.5 @ 25 mL/hr for first 24 hours, not to exceed 50% of est needs  · If electrolytes WNL, increase rate by 10 mL q 12 hours to goal of 45 mL/hr  · Give 120 mL H2O flushes q 4 hours  · Goal provides 1620 kcal, 69 gm pro, 1540 mL free H2O  2. Pt is at high risk for refeeding  · Continue 100 mg thiamine for 10 days; MVI daily (for folic acid)  · Monitor K+, Mg, Phos and replete as needed. Avoid advancing TF until electrolytes corrected.         Seamus Dean RD

## 2019-08-15 NOTE — PROGRESS NOTES
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 He stated that his swallowing progressively worse for the last three weeks and has lost 15 pounds over three weeks. No fevers, chills, sweating, left side chest pain, palpitation, shortness of breath, abdominal pain, urinary complaint, or abnormal bowel movement. He has on and off intermittent dry cough. No lower extremity swelling. The patient does not want any further chemoradiation and has DNR. He states he had a PEG and a tracheostomy in . Interval history / Subjective:   F/y Dysphagia  Failed PEG placement due to severe stricture  G tube could not be placed by IR   Plan for J tube , the patient aware  Right arm swelling  Not able to swallow  Pain controlled with the meds     Assessment & Plan:      Dysphagia, secondary to esophageal stricture. -CXR empysema  -CT Neck  New masslike circumferential wall thickening of the lower cervical and upper  thoracic esophagus as described above, with severe narrowing/near occlusion of the esophagus at the level of the thoracic inlet, highly suspicious for esophageal carcinoma. Endoscopy is recommended. Area of irregular heterogeneous enhancement in the anterior supraglottic larynx measuring 1.0 x 0.6 cm, which may represent treatment-related changes or tumor recurrence. No evidence of cervical lymphadenopathy.   Severe calcific atherosclerosis of the carotid bifurcations with at least mild stenosis of the proximal bilateral internal carotid arteries. Moderate centrilobular emphysema. Spiculated subpleural nodular opacity in the right upper lobe measuring 10 mm, which appears stable. follow-up chest CT in 6-12 months is recommended. New mild superior endplate compression deformities of the T4-T6 vertebral bodies since 2015, though these appear to be chronic. Chronic left mandibular condyle fracture  -NPO, IVF  -seen by ST for swallow eval but deemed not safe to test.s/o as dysphagia sec to esophageal cancer recurrence  -failed attempt to pass through the stricture, J tube scheduled for 8/14. Will start tube feeds when cleared by Surgery    Esophageal mass, with history of base of tongue cancer with possible metastasis  -history of radiation and chemotherapy, and he had history of tracheostomy and PEG tube, which he has used for eight months. The patient does not want any further chemo or radiation treatment.    -Please see Palliative note: Patient clear he does not want oncology or radiation oncology. The patient is ok with PEG tube  -prev under onc dr Carmenza Azev[facility] but has not seen since 2014  -palliative care c/s. Pt is DNR    Right arm swelling  -likely sec to infiltration of fluids  -no need to look for DVT as would not change the management    Severe protein-calorie malnutrition, secondary to dysphagia and underlying malignancy. -GI and  Nutritionist following.  -to have peg 8/12 then to start TF  -will order Tf   Chronic T4, T6 compression deformity, stable.    -noted to be chronic findings on CT as above  - p.r.n. Tylenol and morphine. COPD, stable.    -normal O2 sat on room air.   p.r.n. DuoNebs    Hypertension.    -p.r.n. IV hydralazine. Hypokalemia  -replete with ivf     DVT prophylaxis. Heparin.  On hold awaiting peg.scd  Code:DNR   Functional status: independent with walker    Plan: J tube to be placed today    Care Plan discussed with: Patient/Family  Disposition: TBD     Hospital Problems  Date Reviewed: 8/14/2019          Codes Class Noted POA    * (Principal) Dysphagia ICD-10-CM: R13.10  ICD-9-CM: 787.20  8/7/2019 Unknown                Review of Systems:   A comprehensive review of systems was negative except for that written in the HPI. Vital Signs:    Last 24hrs VS reviewed since prior progress note. Most recent are:  Visit Vitals  /72 (BP 1 Location: Right arm, BP Patient Position: At rest)   Pulse 86   Temp 97.4 °F (36.3 °C)   Resp 18   Ht 5' 5.98\" (1.676 m)   Wt 47.5 kg (104 lb 12.8 oz)   SpO2 97%   BMI 16.92 kg/m²         Intake/Output Summary (Last 24 hours) at 8/15/2019 0945  Last data filed at 8/15/2019 4505  Gross per 24 hour   Intake 700 ml   Output 2050 ml   Net -1350 ml        Physical Examination:             Constitutional:  No acute distress, cooperative, pleasant ,cachectic. Speech impaired from prior radiation   ENT:  Oral mucous moist, oropharynx benign. Neck supple,    Resp:  CTA bilaterally. No wheezing/rhonchi/rales. No accessory muscle use   CV:  Regular rhythm, normal rate, no murmurs, gallops, rubs    GI:  Soft, non distended, non tender. normoactive bowel sounds, no hepatosplenomegaly     Musculoskeletal:  No edema, warm, 2+ pulses throughout    Neurologic:  Moves all extremities. AAOx3, CN II-XII reviewed            Data Review:    Review and/or order of clinical lab test      Labs:     Recent Labs     08/15/19  0355   WBC 8.1   HGB 12.2   HCT 37.4        Recent Labs     08/15/19  0355   *   K 4.9   CL 96*   CO2 30   BUN 6   CREA 0.90   *   CA 9.6   MG 1.8   PHOS 3.9     No results for input(s): SGOT, GPT, ALT, AP, TBIL, TBILI, TP, ALB, GLOB, GGT, AML, LPSE in the last 72 hours. No lab exists for component: AMYP, HLPSE  No results for input(s): INR, PTP, APTT in the last 72 hours.     No lab exists for component: INREXT, INREXT   No results for input(s): FE, TIBC, PSAT, FERR in the last 72 hours. Lab Results   Component Value Date/Time    Folate 10.0 01/07/2016 08:41 AM      No results for input(s): PH, PCO2, PO2 in the last 72 hours. No results for input(s): CPK, CKNDX, TROIQ in the last 72 hours.     No lab exists for component: CPKMB  No results found for: CHOL, CHOLX, CHLST, CHOLV, HDL, LDL, LDLC, DLDLP, TGLX, TRIGL, TRIGP, CHHD, CHHDX  No results found for: [facility]  Lab Results   Component Value Date/Time    Color YELLOW/STRAW 06/07/2018 09:50 AM    Appearance CLEAR 06/07/2018 09:50 AM    Specific gravity >1.030 (H) 06/07/2018 09:50 AM    Specific gravity >1.030 (H) 06/07/2018 09:50 AM    pH (UA) 5.5 06/07/2018 09:50 AM    Protein NEGATIVE  06/07/2018 09:50 AM    Glucose NEGATIVE  06/07/2018 09:50 AM    Ketone NEGATIVE  06/07/2018 09:50 AM    Bilirubin NEGATIVE  06/07/2018 09:50 AM    Urobilinogen 0.2 06/07/2018 09:50 AM    Nitrites NEGATIVE  06/07/2018 09:50 AM    Leukocyte Esterase NEGATIVE  06/07/2018 09:50 AM    Epithelial cells FEW 06/07/2018 09:50 AM    Bacteria NEGATIVE  06/07/2018 09:50 AM    WBC 0-4 06/07/2018 09:50 AM    RBC 0-5 06/07/2018 09:50 AM         Medications Reviewed:     Current Facility-Administered Medications   Medication Dose Route Frequency    thiamine HCL (B-1) tablet 100 mg  100 mg Per G Tube DAILY    therapeutic multivitamin (THERAGRAN) tablet 1 Tab  1 Tab Oral DAILY    dextrose 5% - 0.2% NaCl with KCl 20 mEq/L infusion  75 mL/hr IntraVENous CONTINUOUS    pantoprazole (PROTONIX) 40 mg in sodium chloride 0.9% 10 mL injection  40 mg IntraVENous Q12H    sucralfate (CARAFATE) tablet 1 g  1 g Oral AC&HS    morphine injection 2 mg  2 mg IntraVENous Q3H PRN    sodium chloride (NS) flush 5-40 mL  5-40 mL IntraVENous Q8H    sodium chloride (NS) flush 5-40 mL  5-40 mL IntraVENous PRN    acetaminophen (TYLENOL) tablet 650 mg  650 mg Oral Q4H PRN    HYDROcodone-acetaminophen (NORCO) 5-325 mg per tablet 1 Tab  1 Tab Oral Q4H PRN    hydrALAZINE (APRESOLINE) 20 mg/mL injection 20 mg  20 mg IntraVENous Q6H PRN    albuterol-ipratropium (DUO-NEB) 2.5 MG-0.5 MG/3 ML  3 mL Nebulization Q6H PRN    ondansetron (ZOFRAN) injection 4 mg  4 mg IntraVENous Q4H PRN     ______________________________________________________________________  EXPECTED LENGTH OF STAY: 3d Dev Cleaning MD

## 2019-08-15 NOTE — PROGRESS NOTES
Problem: Falls - Risk of  Goal: *Absence of Falls  Description  Document Elissa Akers Fall Risk and appropriate interventions in the flowsheet. Outcome: Progressing Towards Goal  Note:   Fall Risk Interventions:  Mobility Interventions: Communicate number of staff needed for ambulation/transfer     Medication Interventions: Evaluate medications/consider consulting pharmacy, Patient to call before getting OOB    History of Falls Interventions: Door open when patient unattended, Evaluate medications/consider consulting pharmacy         Problem: Nutrition Deficit  Goal: *Optimize nutritional status  Outcome: Progressing Towards Goal  Note:   Patient on tube feeds. Tolerating well. Problem: Pressure Injury - Risk of  Goal: *Prevention of pressure injury  Description  Document Bryan Scale and appropriate interventions in the flowsheet. Outcome: Progressing Towards Goal  Note:   Pressure Injury Interventions:  Sensory Interventions: Assess changes in LOC, Check visual cues for pain, Float heels, Keep linens dry and wrinkle-free, Maintain/enhance activity level, Pad between skin to skin, Pressure redistribution bed/mattress (bed type), Turn and reposition approx.  every two hours (pillows and wedges if needed)    Moisture Interventions: Absorbent underpads    Activity Interventions: Increase time out of bed, PT/OT evaluation    Mobility Interventions: HOB 30 degrees or less, Float heels    Nutrition Interventions: Document food/fluid/supplement intake    Friction and Shear Interventions: HOB 30 degrees or less, Lift sheet

## 2019-08-15 NOTE — OP NOTES
[facility]  OPERATIVE REPORT    Name:  Saravanan Suarez  MR#:  [id number]  :  1960  ACCOUNT #:  [de-identified]  DATE OF SERVICE:  2019      PREOPERATIVE DIAGNOSIS:  Dysphagia. POSTOPERATIVE DIAGNOSIS:  Dysphagia. PROCEDURES PERFORMED:  1. Insert gastrostomy tube. 2.  Intraoperative fluoroscopy. SURGEON:  Georgette Miguel MD.    ASSISTANT SURGEON:  Brook Banks SA    ANESTHESIA:  General endotracheal.    COMPLICATIONS:  None. SPECIMENS REMOVED:  None. IMPLANTS:  None. ESTIMATED BLOOD LOSS:  Approximately 10 mL. IV FLUIDS:  Crystalloid 600 mL. DRAINS:  16-Bur[place]ian feeding gastrostomy. INDICATIONS FOR SURGERY:  The patient is a 27-year-old man with dysphagia. He was found to have a proximal esophageal stricture on endoscopy and so a percutaneous endoscopic gastrostomy tube could not be placed. Furthermore, the interventional radiologists were unable to advance the wire across the upper esophagus. The patient is brought to the operating room at this time for insertion of a feeding gastrostomy. The risks of the procedure including, but not limited to bleeding, infection and enteric leak were discussed in detail with the patient. Mr. Ruth Abarca understood and wished to proceed. PROCEDURE:  After consent was obtained, the patient was brought to the operating room where he was placed in the supine position on the operating room table. Following the induction of an adequate level of general anesthesia via the endotracheal tube, compression devices were placed on both lower extremities. The abdomen was prepped with ChloraPrep and draped as a sterile field. Local anesthetic was infiltrated and a midline incision through the patient's previous PEG site was opened sharply. Subcutaneous bleeders were carefully cauterized. The incision was carried down to the fascia. Hemostasis was maintained with the cautery.   The site of the patient's previous percutaneous endoscopic gastrostomy tube was identified and the stomach was identified. The stomach was carefully taken down from the anterior abdominal wall. Two concentric 2-0 silk pursestring sutures were placed at the site of the patient's previous gastrostomy tube. The gastrotomy was reopened and a 16-Croatian feeding tube was inserted. The pursestring sutures were tightened down completing the Sergei gastrostomy. Next, contrast was injected into the stomach via the gastrostomy tube. The tube was found to be appropriately positioned in the stomach on intraoperative fluoroscopy. A site on the left side of the abdominal wall was chosen and the feeding tube was brought out through a small opening in the patient's previous left paramedian incision. The stomach was brought up onto the anterior abdominal wall and secured with interrupted 2-0 silk sutures. The feeding tube was secured to the skin with two 2-0 silk sutures. The wound was irrigated copiously with saline, inspected and found to be hemostatic. The fascia was closed with running #1 Vicryl suture. The wound was irrigated again with saline and the incision was closed with two layers of running 2-0 Vicryl suture followed by 4-0 Monocryl subcuticular suture to the skin. Additional local anesthetic was infiltrated, and the incision was dressed with Dermabond. The feeding tube was connected to gravity and a dressing applied. The patient was awakened from his general anesthetic and extubated in the operating room. He was transferred to his bed and brought to the recovery room in stable condition having tolerated the procedure well. At the conclusion of the procedure, all sponge counts, instruments counts and needle counts were reported as correct x2.         Jovita Palafox MD      [id number]  D:  08/14/2019 17:56  T:  08/14/2019 23:40  JOB #:  [id number]  CC:  MD Shannon Oro MD Haroldine Handing, MD

## 2019-08-16 LAB
ALBUMIN SERPL-MCNC: 3 G/DL (ref 3.5–5)
ALBUMIN/GLOB SERPL: 0.8 {RATIO} (ref 1.1–2.2)
ALP SERPL-CCNC: 92 U/L (ref 45–117)
ALT SERPL-CCNC: 13 U/L (ref 12–78)
ANION GAP SERPL CALC-SCNC: 4 MMOL/L (ref 5–15)
AST SERPL-CCNC: 10 U/L (ref 15–37)
BILIRUB SERPL-MCNC: 0.3 MG/DL (ref 0.2–1)
BUN SERPL-MCNC: 4 MG/DL (ref 6–20)
BUN/CREAT SERPL: 7 (ref 12–20)
CALCIUM SERPL-MCNC: 9.3 MG/DL (ref 8.5–10.1)
CHLORIDE SERPL-SCNC: 101 MMOL/L (ref 97–108)
CO2 SERPL-SCNC: 29 MMOL/L (ref 21–32)
CREAT SERPL-MCNC: 0.6 MG/DL (ref 0.7–1.3)
GLOBULIN SER CALC-MCNC: 3.8 G/DL (ref 2–4)
GLUCOSE SERPL-MCNC: 118 MG/DL (ref 65–100)
MAGNESIUM SERPL-MCNC: 1.6 MG/DL (ref 1.6–2.4)
PHOSPHATE SERPL-MCNC: 2.5 MG/DL (ref 2.6–4.7)
POTASSIUM SERPL-SCNC: 3.9 MMOL/L (ref 3.5–5.1)
PROT SERPL-MCNC: 6.8 G/DL (ref 6.4–8.2)
SODIUM SERPL-SCNC: 134 MMOL/L (ref 136–145)

## 2019-08-16 PROCEDURE — 74011250637 HC RX REV CODE- 250/637: Performed by: SURGERY

## 2019-08-16 PROCEDURE — 74011250637 HC RX REV CODE- 250/637: Performed by: HOSPITALIST

## 2019-08-16 PROCEDURE — C9113 INJ PANTOPRAZOLE SODIUM, VIA: HCPCS | Performed by: SURGERY

## 2019-08-16 PROCEDURE — 97116 GAIT TRAINING THERAPY: CPT

## 2019-08-16 PROCEDURE — 74011250636 HC RX REV CODE- 250/636: Performed by: SURGERY

## 2019-08-16 PROCEDURE — 65270000032 HC RM SEMIPRIVATE

## 2019-08-16 PROCEDURE — 36415 COLL VENOUS BLD VENIPUNCTURE: CPT

## 2019-08-16 PROCEDURE — 83735 ASSAY OF MAGNESIUM: CPT

## 2019-08-16 PROCEDURE — 74011000250 HC RX REV CODE- 250: Performed by: SURGERY

## 2019-08-16 PROCEDURE — 80053 COMPREHEN METABOLIC PANEL: CPT

## 2019-08-16 PROCEDURE — 84100 ASSAY OF PHOSPHORUS: CPT

## 2019-08-16 RX ADMIN — SUCRALFATE 1 G: 1 TABLET ORAL at 20:40

## 2019-08-16 RX ADMIN — SODIUM CHLORIDE 40 MG: 9 INJECTION INTRAMUSCULAR; INTRAVENOUS; SUBCUTANEOUS at 20:39

## 2019-08-16 RX ADMIN — Medication 10 ML: at 20:39

## 2019-08-16 RX ADMIN — DIBASIC SODIUM PHOSPHATE, MONOBASIC POTASSIUM PHOSPHATE AND MONOBASIC SODIUM PHOSPHATE 1 TABLET: 852; 155; 130 TABLET ORAL at 11:30

## 2019-08-16 RX ADMIN — Medication 10 ML: at 13:39

## 2019-08-16 RX ADMIN — Medication 10 ML: at 19:00

## 2019-08-16 RX ADMIN — MORPHINE SULFATE 2 MG: 2 INJECTION, SOLUTION INTRAMUSCULAR; INTRAVENOUS at 22:49

## 2019-08-16 RX ADMIN — SUCRALFATE 1 G: 1 TABLET ORAL at 06:42

## 2019-08-16 RX ADMIN — SUCRALFATE 1 G: 1 TABLET ORAL at 16:04

## 2019-08-16 RX ADMIN — Medication 100 MG: at 08:43

## 2019-08-16 RX ADMIN — THERA TABS 1 TABLET: TAB at 08:43

## 2019-08-16 RX ADMIN — MORPHINE SULFATE 2 MG: 2 INJECTION, SOLUTION INTRAMUSCULAR; INTRAVENOUS at 18:58

## 2019-08-16 RX ADMIN — SODIUM CHLORIDE 40 MG: 9 INJECTION INTRAMUSCULAR; INTRAVENOUS; SUBCUTANEOUS at 08:43

## 2019-08-16 RX ADMIN — DIBASIC SODIUM PHOSPHATE, MONOBASIC POTASSIUM PHOSPHATE AND MONOBASIC SODIUM PHOSPHATE 1 TABLET: 852; 155; 130 TABLET ORAL at 19:03

## 2019-08-16 RX ADMIN — Medication 10 ML: at 20:40

## 2019-08-16 RX ADMIN — MORPHINE SULFATE 2 MG: 2 INJECTION, SOLUTION INTRAMUSCULAR; INTRAVENOUS at 13:38

## 2019-08-16 RX ADMIN — ACETAMINOPHEN 650 MG: 325 TABLET, FILM COATED ORAL at 09:34

## 2019-08-16 RX ADMIN — MORPHINE SULFATE 2 MG: 2 INJECTION, SOLUTION INTRAMUSCULAR; INTRAVENOUS at 02:00

## 2019-08-16 NOTE — PROGRESS NOTES
Referral sent to [facility]/ [facility]. Agency checked insurance and patient does not have coverage for out-of hospital services. Therefore, home health, enteral services are not covered. Patient would have an out-of-pocket cost of $22-$23/day. Patient told the agency liaison Eros Flores RN) that he can't afford this out-of pocket expense. CM and agency exploring other options for payment and checking insurance limitations.     Verónica López, BSW, CRM

## 2019-08-16 NOTE — PROGRESS NOTES
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 He stated that his swallowing progressively worse for the last three weeks and has lost 15 pounds over three weeks. No fevers, chills, sweating, left side chest pain, palpitation, shortness of breath, abdominal pain, urinary complaint, or abnormal bowel movement. He has on and off intermittent dry cough. No lower extremity swelling. The patient does not want any further chemoradiation and has DNR. He states he had a PEG and a tracheostomy in . Interval history / Subjective:   F/y Dysphagia  Now s/p J tube   Pain controlled with the meds  Tolerating tube feeds, started 8/15     Assessment & Plan:      Dysphagia, secondary to esophageal stricture/likely esophageal carcinoma  -CXR empysema  -CT Neck  New masslike circumferential wall thickening of the lower cervical and upper  thoracic esophagus as described above, with severe narrowing/near occlusion of the esophagus at the level of the thoracic inlet, highly suspicious for esophageal carcinoma. Endoscopy is recommended. Area of irregular heterogeneous enhancement in the anterior supraglottic larynx measuring 1.0 x 0.6 cm, which may represent treatment-related changes or tumor recurrence. No evidence of cervical lymphadenopathy. Severe calcific atherosclerosis of the carotid bifurcations with at least mild stenosis of the proximal bilateral internal carotid arteries. Moderate centrilobular emphysema. Spiculated subpleural nodular opacity in the right upper lobe measuring 10 mm, which appears stable. follow-up chest CT in 6-12 months is recommended. New mild superior endplate compression deformities of the T4-T6 vertebral bodies since 2015, though these appear to be chronic. Chronic left mandibular condyle fracture  -seen by SLP for swallow eval but deemed not safe to test. s/o as dysphagia sec to esophageal cancer recurrence  -s/p J tube 8/14, started tube feeds 8/15  -Monitoring for refeeding    Esophageal mass, with history of base of tongue cancer with possible metastasis  -history of radiation and chemotherapy, and he had history of tracheostomy and PEG tube, which he has used for eight months. The patient does not want any further chemo or radiation treatment.    -Please see Palliative note: Patient clear he does not want oncology or radiation oncology. The patient is ok with PEG tube  -prev under onc [facility]ip [facility] but has not seen since 2014  -palliative care c/s. Pt is DNR    Right arm swelling  -likely sec to infiltration of fluids  -no need to look for DVT as would not change the management  -now resolved    Severe protein-calorie malnutrition, secondary to dysphagia and underlying malignancy. -GI and  Nutritionist following.  -tube feeds started 8/15  -advance as per RD rec    Chronic T4, T6 compression deformity, stable.    -noted to be chronic findings on CT as above  - p.r.n. Tylenol and morphine. COPD, stable.    -normal O2 sat on room air.   p.r.n. DuoNebs    Hypertension.    -p.r.n. IV hydralazine. Hypokalemia  -replace as needed     DVT prophylaxis.   scd  Code: DNR   Functional status: independent with walker    Plan: Advance tube feeds as recommended by [facility] Mane Negron discussed with: Patient/Family  Disposition: home when able     Hospital Problems  Date Reviewed: 8/14/2019          Codes Class Noted POA    * (Principal) Dysphagia ICD-10-CM: R13.10  ICD-9-CM: 787.20  8/7/2019 Unknown                Review of Systems:   A comprehensive review of systems was negative except for that written in the HPI. Vital Signs:    Last 24hrs VS reviewed since prior progress note. Most recent are:  Visit Vitals  BP (!) 150/97 (BP 1 Location: Right arm, BP Patient Position: At rest)   Pulse 85   Temp 98.7 °F (37.1 °C)   Resp 18   Ht 5' 5.98\" (1.676 m)   Wt 47.5 kg (104 lb 12.8 oz)   SpO2 99%   BMI 16.92 kg/m²         Intake/Output Summary (Last 24 hours) at 8/16/2019 8298  Last data filed at 8/16/2019 7580  Gross per 24 hour   Intake 240 ml   Output 1450 ml   Net -1210 ml        Physical Examination:             Constitutional:  No acute distress, cooperative, pleasant ,cachectic. Speech impaired from prior radiation   ENT:  Oral mucous moist, oropharynx benign. Neck supple,    Resp:  CTA bilaterally. No wheezing/rhonchi/rales. No accessory muscle use   CV:  Regular rhythm, normal rate, no murmurs, gallops, rubs    GI:  Soft, non distended, non tender. normoactive bowel sounds, no hepatosplenomegaly     Musculoskeletal:  No edema, warm, 2+ pulses throughout    Neurologic:  Moves all extremities. AAOx3, CN II-XII reviewed            Data Review:    Review and/or order of clinical lab test      Labs:     Recent Labs     08/15/19  0355   WBC 8.1   HGB 12.2   HCT 37.4        Recent Labs     08/16/19  0116 08/15/19  0355   * 131*   K 3.9 4.9    96*   CO2 29 30   BUN 4* 6   CREA 0.60* 0.90   * 173*   CA 9.3 9.6   MG 1.6 1.8   PHOS 2.5* 3.9     Recent Labs     08/16/19  0116   SGOT 10*   ALT 13   AP 92   TBILI 0.3   TP 6.8   ALB 3.0*   GLOB 3.8     No results for input(s): INR, PTP, APTT in the last 72 hours. No lab exists for component: INREXT, INREXT   No results for input(s): FE, TIBC, PSAT, FERR in the last 72 hours.    Lab Results   Component Value Date/Time    Folate 10.0 01/07/2016 08:41 AM      No results for input(s): PH, PCO2, PO2 in the last 72 hours. No results for input(s): CPK, CKNDX, TROIQ in the last 72 hours.     No lab exists for component: CPKMB  No results found for: CHOL, CHOLX, CHLST, CHOLV, HDL, LDL, LDLC, DLDLP, TGLX, TRIGL, TRIGP, CHHD, CHHDX  No results found for: [facility]  Lab Results   Component Value Date/Time    Color YELLOW/STRAW 06/07/2018 09:50 AM    Appearance CLEAR 06/07/2018 09:50 AM    Specific gravity >1.030 (H) 06/07/2018 09:50 AM    Specific gravity >1.030 (H) 06/07/2018 09:50 AM    pH (UA) 5.5 06/07/2018 09:50 AM    Protein NEGATIVE  06/07/2018 09:50 AM    Glucose NEGATIVE  06/07/2018 09:50 AM    Ketone NEGATIVE  06/07/2018 09:50 AM    Bilirubin NEGATIVE  06/07/2018 09:50 AM    Urobilinogen 0.2 06/07/2018 09:50 AM    Nitrites NEGATIVE  06/07/2018 09:50 AM    Leukocyte Esterase NEGATIVE  06/07/2018 09:50 AM    Epithelial cells FEW 06/07/2018 09:50 AM    Bacteria NEGATIVE  06/07/2018 09:50 AM    WBC 0-4 06/07/2018 09:50 AM    RBC 0-5 06/07/2018 09:50 AM         Medications Reviewed:     Current Facility-Administered Medications   Medication Dose Route Frequency    sodium chloride (NS) flush 5-40 mL  5-40 mL IntraVENous Q8H    sodium chloride (NS) flush 5-40 mL  5-40 mL IntraVENous PRN    thiamine HCL (B-1) tablet 100 mg  100 mg Per G Tube DAILY    therapeutic multivitamin (THERAGRAN) tablet 1 Tab  1 Tab Oral DAILY    dextrose 5% - 0.2% NaCl with KCl 20 mEq/L infusion  75 mL/hr IntraVENous CONTINUOUS    pantoprazole (PROTONIX) 40 mg in sodium chloride 0.9% 10 mL injection  40 mg IntraVENous Q12H    sucralfate (CARAFATE) tablet 1 g  1 g Oral AC&HS    morphine injection 2 mg  2 mg IntraVENous Q3H PRN    sodium chloride (NS) flush 5-40 mL  5-40 mL IntraVENous Q8H    sodium chloride (NS) flush 5-40 mL  5-40 mL IntraVENous PRN    acetaminophen (TYLENOL) tablet 650 mg  650 mg Oral Q4H PRN    HYDROcodone-acetaminophen (NORCO) 5-325 mg per tablet 1 Tab  1 Tab Oral Q4H PRN    hydrALAZINE (APRESOLINE) 20 mg/mL injection 20 mg  20 mg IntraVENous Q6H PRN    albuterol-ipratropium (DUO-NEB) 2.5 MG-0.5 MG/3 ML  3 mL Nebulization Q6H PRN    ondansetron (ZOFRAN) injection 4 mg  4 mg IntraVENous Q4H PRN     ______________________________________________________________________  EXPECTED LENGTH OF STAY: 3d Louisa Gilman MD

## 2019-08-16 NOTE — PROGRESS NOTES
Problem: Mobility Impaired (Adult and Pediatric)  Goal: *Acute Goals and Plan of Care (Insert Text)  Description  FUNCTIONAL STATUS PRIOR TO ADMISSION: Patient was modified independent using a Rollator for functional mobility. HOME SUPPORT PRIOR TO ADMISSION: The patient lived with wife but did not require assist.    Physical Therapy Goals  Initiated 8/8/2019  1. Patient will move from supine to sit and sit to supine  in bed with independence within 7 day(s). 2.  Patient will transfer from bed to chair and chair to bed with modified independence using the least restrictive device within 7 day(s). 3.  Patient will perform sit to stand with modified independence within 7 day(s). 4.  Patient will ambulate with supervision/set-up for 200 feet with the least restrictive device within 7 day(s). 5.  Patient will ascend/descend 2 stairs with bilat handrail(s) with SBA within 7 day(s). Outcome: Resolved/Met   PHYSICAL THERAPY TREATMENT/DISCHARGE  Patient: Aria Moreno (26 y.o. male)  Date: 8/16/2019  Diagnosis: Dysphagia [R13.10] Dysphagia  Procedure(s) (LRB):  INSERTION GASTROSTOMY TUBE (N/A) 2 Days Post-Op  Precautions:    Chart, physical therapy assessment, plan of care and goals were reviewed. ASSESSMENT  Based on the objective data described below, patient moving well with steady gait and management of bed mobility. Patient states he has a ramp and doesn't need to practice steps. Demonstrating safe mobility and anticipate returning home soon. Safe to ambulate in halls with staff supervision until discharge and no PT needs at discharge. Siria Kumar PLAN :  Patient will be discharged from acute skilled physical therapy at this time. Rationale for discharge:  Goals achieved    Recommendation for discharge: (in order for the patient to meet his/her long term goals)  No skilled physical therapy/ follow up rehabilitation needs identified at this time.     This discharge recommendation:  Has been made in collaboration with the attending provider and/or case management    Equipment recommendations for successful discharge: none       SUBJECTIVE:   Patient stated I do okay at home.     OBJECTIVE DATA SUMMARY:   Critical Behavior:     Orientation Level: Appropriate for age  Cognition: Follows commands     Functional Mobility Training:  Bed Mobility:     Supine to Sit: Independent  Sit to Supine: Independent           Transfers:  Sit to Stand: Modified independent  Stand to Sit: Modified independent                             Balance:  Sitting: Intact  Standing: Intact; With support  Ambulation/Gait Training:  Distance (ft): 300 Feet (ft)  Assistive Device: Gait belt;Walker, rolling  Ambulation - Level of Assistance: Supervision        Gait Abnormalities: Ataxic              Speed/Chani: Slow             After treatment patient left in no apparent distress:   Supine in bed and Call bell within reach    COMMUNICATION/COLLABORATION:   The patients plan of care was discussed with: Registered Nurse    Raj Spencer PT   Time Calculation: 15 mins

## 2019-08-16 NOTE — PROGRESS NOTES
NUTRITION    RECOMMENDATIONS:     1. Replete Phos. Continue to monitor electrolytes daily for the next 48 hours and replete aggressively as needed. 2. Continue 100 mg thiamine and MVI daily    3. Convert to bolus feeds in next 24 hours in anticipation of d/c. Pt needs 4.5 cans of Jevity 1.5 and minimum additional 720 mL (3 cups) H2O daily to meet hydration needs  · Give 1 can (240 mL) TID + 1.5 cans (360 mL) x 1 feeding (total 4 feeds/day)  · Flush with 90 mL H2O before and after each feed  · Goal provides 1600 kcal, 69 gm pro, 1530 mL free H2O     RD INTERVENTION:    Food/Nutrient Delivery:   ,  ,  ,  , Enteral/Parenteral Nutrition: Modify rate, concentration, composition, and scheduletube feeding  Nutrition Education: ,    Nutrition Counseling: Provided: Collaboration with other providers  Coordination of Care:     ASSESSMENT:   Reason for Assessment:   [x]Follow Up      8/8/19:  Pt is a 61year old male admitted on 8/7 with dysphagia, inability to tolerate po for past 2+ weeks. Pt has h/o tobacco and alcohol abuse, tongue cancer, COPD, ? Lung mass. Pt was first diagnosed in 2014, underwent trach and PEG placement, radiation and chemo. He apparently did not follow up with specialists after that period of time. Upon admission here, found to have large esophageal mass which has prohibited him from swallowing. Pt reports back in may 2019 he weighed about 138 pounds (62.7 kg); he is now 105 pounds. This equates to a 24% loss of total body weight. He is very cachectic in appearance, muslce wasting on all extremeties, temporal wasting as well. He meets criteria for severe protein calorie malnutrition. Pt states is Coco Miracle with getting a trach and PEG, but does not plan to pursue additional chemo/radiation treatments. 8/16/19:  PEG placed 8/14, started continuous feeds AM of 8/15 and was supposed to remain on low kcal feeds for 24 hours d/t refeeding risk. However, pt is already at goal this morning. Minimal residuals noted. His Phos is low and while K and Mg currently WNL, they dropped from yesterday (see labs below). All of this is indicative of refeeding. He needs repletion. Per CM, pt's insurance will not cover his TF and it appears he may need to pay out of pocket, which he reports he cannot afford. CM is looking into additional insurance coverage at this time. Since pt will need to d/c home with bolus feeds, will plan to convert to goal bolus feeding over the next couple days. Recent Labs     08/16/19  0116 08/15/19  0355   * 173*   BUN 4* 6   CREA 0.60* 0.90   * 131*   K 3.9 4.9    96*   CO2 29 30   CA 9.3 9.6   PHOS 2.5* 3.9   MG 1.6 1.8         Meets Criteria for Severe Acute Malnutrition as evidenced by:   [x] Moderate muscle wasting, loss of subcutaneous fat   [x] Nutritional intake of <50% of recommended intake for >5 days   [x] Weight loss of >1-2% in 1 week, >5% in 1 month, >7.5% in 3 months, or >10% in 6 months   [] Moderate-severe edema     Nutrition History:  Usual Appetite: Poor  Diet at Home: (some liquids for past few weeks)  Vitamins/Supplements: No    Current Nutritional Intake:   Diet Order:     Appetite: Poor  PO Ability:    Documented %PO Intake:  No data found. Avg %meal intake:   Average supplements intake:     Average kcal/pro intake:    Estimated Nutrition Needs Met based on Average %meal intake:  Calories %:    Protein %:         Anthropometrics: Wt Readings from Last 8 Encounters:   08/10/19 47.5 kg (104 lb 12.8 oz)   08/07/19 56.7 kg (125 lb)   06/14/18 47.6 kg (105 lb)   06/09/18 51.6 kg (113 lb 12.1 oz)   06/07/18 48.9 kg (107 lb 12.9 oz)   08/29/17 54.4 kg (120 lb)   01/07/16 46 kg (101 lb 6.4 oz)   12/31/15 46.3 kg (102 lb)     Weight Source: Standing scale (comment)  Height: 5' 5.98\" (167.6 cm)(obtained 8/7/19),    Body mass index is 16.92 kg/m².    ,  , Usual Body Weight: (~ 138 pounds / 62.7 kg),      Estimated Daily Nutrition Requirements: Weight Used: Actual wt  Kcals: (~ 5588-7750 kcals) Based on:Timur William(using actual wt x factor of 1.3-1.4)  Protein: (1.3-1.5 gm pro/kg or 62-72 gm pro)   Fluid: (~ 1 ml/kcal)    GI / Oral / Respiratory Concerns  Abd:  Non distended  BM: 8/14   ; Chew/Swallow: Difficulty (comment)(has esophageal mass);  ;   ;    Skin Integrity: [x]Intact  []Other  Edema: [x]None []Other  Food Allergies: [x]None []Other    Nutritionally Significant Labs & Medications: [x] Reviewed & Includes:  Protonix, K Phos, Carafate, MVI, Thiamine      Recent Labs     08/16/19  0116 08/15/19  0355   * 173*   BUN 4* 6   CREA 0.60* 0.90   * 131*   K 3.9 4.9    96*   CO2 29 30   CA 9.3 9.6   PHOS 2.5* 3.9   MG 1.6 1.8         Education & Discharge Needs:   [] None Identified   [x] Identified and addressed    [x] Participated in care plan, discharge planning, Interdisciplinary rounds        Diet Restrictions:   Cultural/Jewish Preference(s): None     NUTRITION DIAGNOSIS:     Malnutrition related to inability to tolerate po as evidenced by large esophageal mass    Pt is at Nutrition Risk:  [x]    No Nutrition Risk Identified:  []    PT GOALS:     1. Pt will tolerate goal tube feeding; electrolytes will be repleted and remain WNL    MONITORING & EVALUATION:    Food/Nutrient Intake Outcomes: Total energy intake, Enteral/parenteral nutrition intake   Physical Signs/Symptoms Outcomes: Weight/weight change        Previous Nutrition Goals:   Previous Goal Met: Progressing    Previous Recommendations:      Previous Recommendations Implemented:  Yes                                                                                                          Brad Quezada RD

## 2019-08-17 LAB
ANION GAP SERPL CALC-SCNC: 9 MMOL/L (ref 5–15)
BUN SERPL-MCNC: 5 MG/DL (ref 6–20)
BUN/CREAT SERPL: 9 (ref 12–20)
CALCIUM SERPL-MCNC: 9.7 MG/DL (ref 8.5–10.1)
CHLORIDE SERPL-SCNC: 96 MMOL/L (ref 97–108)
CO2 SERPL-SCNC: 29 MMOL/L (ref 21–32)
CREAT SERPL-MCNC: 0.56 MG/DL (ref 0.7–1.3)
GLUCOSE SERPL-MCNC: 95 MG/DL (ref 65–100)
MAGNESIUM SERPL-MCNC: 1.9 MG/DL (ref 1.6–2.4)
PHOSPHATE SERPL-MCNC: 3.3 MG/DL (ref 2.6–4.7)
POTASSIUM SERPL-SCNC: 3.9 MMOL/L (ref 3.5–5.1)
SODIUM SERPL-SCNC: 134 MMOL/L (ref 136–145)

## 2019-08-17 PROCEDURE — 74011250637 HC RX REV CODE- 250/637: Performed by: SURGERY

## 2019-08-17 PROCEDURE — 74011250637 HC RX REV CODE- 250/637: Performed by: HOSPITALIST

## 2019-08-17 PROCEDURE — 36415 COLL VENOUS BLD VENIPUNCTURE: CPT

## 2019-08-17 PROCEDURE — 65270000032 HC RM SEMIPRIVATE

## 2019-08-17 PROCEDURE — 74011250636 HC RX REV CODE- 250/636: Performed by: SURGERY

## 2019-08-17 PROCEDURE — 84100 ASSAY OF PHOSPHORUS: CPT

## 2019-08-17 PROCEDURE — 74011000250 HC RX REV CODE- 250: Performed by: SURGERY

## 2019-08-17 PROCEDURE — 83735 ASSAY OF MAGNESIUM: CPT

## 2019-08-17 PROCEDURE — 80048 BASIC METABOLIC PNL TOTAL CA: CPT

## 2019-08-17 PROCEDURE — C9113 INJ PANTOPRAZOLE SODIUM, VIA: HCPCS | Performed by: SURGERY

## 2019-08-17 RX ADMIN — SODIUM CHLORIDE 40 MG: 9 INJECTION INTRAMUSCULAR; INTRAVENOUS; SUBCUTANEOUS at 08:33

## 2019-08-17 RX ADMIN — MORPHINE SULFATE 2 MG: 2 INJECTION, SOLUTION INTRAMUSCULAR; INTRAVENOUS at 20:32

## 2019-08-17 RX ADMIN — Medication 10 ML: at 21:07

## 2019-08-17 RX ADMIN — Medication 100 MG: at 08:33

## 2019-08-17 RX ADMIN — SUCRALFATE 1 G: 1 TABLET ORAL at 21:06

## 2019-08-17 RX ADMIN — SUCRALFATE 1 G: 1 TABLET ORAL at 17:20

## 2019-08-17 RX ADMIN — DIBASIC SODIUM PHOSPHATE, MONOBASIC POTASSIUM PHOSPHATE AND MONOBASIC SODIUM PHOSPHATE 1 TABLET: 852; 155; 130 TABLET ORAL at 17:20

## 2019-08-17 RX ADMIN — SUCRALFATE 1 G: 1 TABLET ORAL at 10:56

## 2019-08-17 RX ADMIN — SUCRALFATE 1 G: 1 TABLET ORAL at 06:22

## 2019-08-17 RX ADMIN — DIBASIC SODIUM PHOSPHATE, MONOBASIC POTASSIUM PHOSPHATE AND MONOBASIC SODIUM PHOSPHATE 1 TABLET: 852; 155; 130 TABLET ORAL at 08:37

## 2019-08-17 RX ADMIN — MORPHINE SULFATE 2 MG: 2 INJECTION, SOLUTION INTRAMUSCULAR; INTRAVENOUS at 10:57

## 2019-08-17 RX ADMIN — Medication 10 ML: at 14:19

## 2019-08-17 RX ADMIN — SODIUM CHLORIDE 40 MG: 9 INJECTION INTRAMUSCULAR; INTRAVENOUS; SUBCUTANEOUS at 20:32

## 2019-08-17 RX ADMIN — MORPHINE SULFATE 2 MG: 2 INJECTION, SOLUTION INTRAMUSCULAR; INTRAVENOUS at 14:52

## 2019-08-17 RX ADMIN — THERA TABS 1 TABLET: TAB at 08:33

## 2019-08-17 RX ADMIN — MORPHINE SULFATE 2 MG: 2 INJECTION, SOLUTION INTRAMUSCULAR; INTRAVENOUS at 06:21

## 2019-08-17 RX ADMIN — Medication 10 ML: at 06:21

## 2019-08-17 NOTE — PROGRESS NOTES
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 He stated that his swallowing progressively worse for the last three weeks and has lost 15 pounds over three weeks. No fevers, chills, sweating, left side chest pain, palpitation, shortness of breath, abdominal pain, urinary complaint, or abnormal bowel movement. He has on and off intermittent dry cough. No lower extremity swelling. The patient does not want any further chemoradiation and has DNR. He states he had a PEG and a tracheostomy in . Interval history / Subjective:   F/y Dysphagia  Now s/p J tube   Pain controlled with the meds  Tolerating tube feeds, started 8/15     Assessment & Plan:      Dysphagia, secondary to esophageal stricture/likely esophageal carcinoma  -CXR empysema  -CT Neck  New masslike circumferential wall thickening of the lower cervical and upper  thoracic esophagus as described above, with severe narrowing/near occlusion of the esophagus at the level of the thoracic inlet, highly suspicious for esophageal carcinoma. Endoscopy is recommended. Area of irregular heterogeneous enhancement in the anterior supraglottic larynx measuring 1.0 x 0.6 cm, which may represent treatment-related changes or tumor recurrence. No evidence of cervical lymphadenopathy. Severe calcific atherosclerosis of the carotid bifurcations with at least mild stenosis of the proximal bilateral internal carotid arteries. Moderate centrilobular emphysema. Spiculated subpleural nodular opacity in the right upper lobe measuring 10 mm, which appears stable. follow-up chest CT in 6-12 months is recommended. New mild superior endplate compression deformities of the T4-T6 vertebral bodies since 2015, though these appear to be chronic. Chronic left mandibular condyle fracture  -seen by SLP for swallow eval but deemed not safe to test. s/o as dysphagia sec to esophageal cancer recurrence  -s/p J tube 8/14, started tube feeds 8/15  -Monitoring for refeeding    Esophageal mass, with history of base of tongue cancer with possible metastasis  -history of radiation and chemotherapy, and he had history of tracheostomy and PEG tube, which he has used for eight months. The patient does not want any further chemo or radiation treatment.    -Please see Palliative note: Patient clear he does not want oncology or radiation oncology. The patient is ok with PEG tube  -prev under onc dr Chip Klinefelter with [facility] but has not seen since 2014  -palliative care c/s. Pt is DNR    Right arm swelling  -likely sec to infiltration of fluids  -no need to look for DVT as would not change the management  -now resolved    Severe protein-calorie malnutrition, secondary to dysphagia and underlying malignancy. -GI and  Nutritionist following.  -tube feeds started 8/15  -advance as per RD rec    Chronic T4, T6 compression deformity, stable.    -noted to be chronic findings on CT as above  - p.r.n. Tylenol and morphine. COPD, stable.    -normal O2 sat on room air.   p.r.n. DuoNebs    Hypertension.    -p.r.n. IV hydralazine. Hypokalemia  -replace as needed     DVT prophylaxis. scd  Code: DNR   Functional status: independent with walker    Plan: Advance tube feeds as recommended by dietician.  Discharge on 8/19 if remains stable    Care Plan discussed with: Patient/Family  Disposition: home when able     Hospital Problems  Date Reviewed: 8/14/2019          Codes Class Noted POA    * (Principal) Dysphagia ICD-10-CM: R13.10  ICD-9-CM: 787.20  8/7/2019 Unknown                Review of Systems:   A comprehensive review of systems was negative except for that written in the HPI. Vital Signs:    Last 24hrs VS reviewed since prior progress note. Most recent are:  Visit Vitals  /85 (BP 1 Location: Right arm, BP Patient Position: At rest)   Pulse 79   Temp 98.7 °F (37.1 °C)   Resp 18   Ht 5' 5.98\" (1.676 m)   Wt 47.5 kg (104 lb 12.8 oz)   SpO2 98%   BMI 16.92 kg/m²         Intake/Output Summary (Last 24 hours) at 8/17/2019 1631  Last data filed at 8/17/2019 1043  Gross per 24 hour   Intake 230 ml   Output 550 ml   Net -320 ml        Physical Examination:             Constitutional:  No acute distress, cooperative, pleasant ,cachectic. Speech impaired from prior radiation   ENT:  Oral mucous moist, oropharynx benign. Neck supple,    Resp:  CTA bilaterally. No wheezing/rhonchi/rales. No accessory muscle use   CV:  Regular rhythm, normal rate, no murmurs, gallops, rubs    GI:  Soft, non distended, non tender. normoactive bowel sounds, no hepatosplenomegaly     Musculoskeletal:  No edema, warm, 2+ pulses throughout    Neurologic:  Moves all extremities. AAOx3, CN II-XII reviewed            Data Review:    Review and/or order of clinical lab test      Labs:     Recent Labs     08/15/19  0355   WBC 8.1   HGB 12.2   HCT 37.4        Recent Labs     08/17/19  0434 08/16/19  0116 08/15/19  0355   * 134* 131*   K 3.9 3.9 4.9   CL 96* 101 96*   CO2 29 29 30   BUN 5* 4* 6   CREA 0.56* 0.60* 0.90   GLU 95 118* 173*   CA 9.7 9.3 9.6   MG 1.9 1.6 1.8   PHOS 3.3 2.5* 3.9     Recent Labs     08/16/19  0116   SGOT 10*   ALT 13   AP 92   TBILI 0.3   TP 6.8   ALB 3.0*   GLOB 3.8     No results for input(s): INR, PTP, APTT in the last 72 hours. No lab exists for component: INREXT, INREXT   No results for input(s): FE, TIBC, PSAT, FERR in the last 72 hours.    Lab Results   Component Value Date/Time    Folate 10.0 01/07/2016 08:41 AM      No results for input(s): PH, PCO2, PO2 in the last 72 hours. No results for input(s): CPK, CKNDX, TROIQ in the last 72 hours.     No lab exists for component: CPKMB  No results found for: CHOL, CHOLX, CHLST, CHOLV, HDL, LDL, LDLC, DLDLP, TGLX, TRIGL, TRIGP, CHHD, CHHDX  No results found for: [facility]  Lab Results   Component Value Date/Time    Color YELLOW/STRAW 06/07/2018 09:50 AM    Appearance CLEAR 06/07/2018 09:50 AM    Specific gravity >1.030 (H) 06/07/2018 09:50 AM    Specific gravity >1.030 (H) 06/07/2018 09:50 AM    pH (UA) 5.5 06/07/2018 09:50 AM    Protein NEGATIVE  06/07/2018 09:50 AM    Glucose NEGATIVE  06/07/2018 09:50 AM    Ketone NEGATIVE  06/07/2018 09:50 AM    Bilirubin NEGATIVE  06/07/2018 09:50 AM    Urobilinogen 0.2 06/07/2018 09:50 AM    Nitrites NEGATIVE  06/07/2018 09:50 AM    Leukocyte Esterase NEGATIVE  06/07/2018 09:50 AM    Epithelial cells FEW 06/07/2018 09:50 AM    Bacteria NEGATIVE  06/07/2018 09:50 AM    WBC 0-4 06/07/2018 09:50 AM    RBC 0-5 06/07/2018 09:50 AM         Medications Reviewed:     Current Facility-Administered Medications   Medication Dose Route Frequency    phosphorus (K PHOS NEUTRAL) 250 mg tablet 1 Tab  1 Tab Per J Tube BID    sodium chloride (NS) flush 5-40 mL  5-40 mL IntraVENous PRN    thiamine HCL (B-1) tablet 100 mg  100 mg Per G Tube DAILY    therapeutic multivitamin (THERAGRAN) tablet 1 Tab  1 Tab Oral DAILY    pantoprazole (PROTONIX) 40 mg in sodium chloride 0.9% 10 mL injection  40 mg IntraVENous Q12H    sucralfate (CARAFATE) tablet 1 g  1 g Oral AC&HS    morphine injection 2 mg  2 mg IntraVENous Q3H PRN    sodium chloride (NS) flush 5-40 mL  5-40 mL IntraVENous Q8H    sodium chloride (NS) flush 5-40 mL  5-40 mL IntraVENous PRN    acetaminophen (TYLENOL) tablet 650 mg  650 mg Oral Q4H PRN    HYDROcodone-acetaminophen (NORCO) 5-325 mg per tablet 1 Tab  1 Tab Oral Q4H PRN    hydrALAZINE (APRESOLINE) 20 mg/mL injection 20 mg  20 mg IntraVENous Q6H PRN    albuterol-ipratropium (DUO-NEB) 2.5 MG-0.5 MG/3 ML  3 mL Nebulization Q6H PRN    ondansetron (ZOFRAN) injection 4 mg  4 mg IntraVENous Q4H PRN     ______________________________________________________________________  EXPECTED LENGTH OF STAY: 3d Adri Chavira MD

## 2019-08-18 LAB
ANION GAP SERPL CALC-SCNC: 5 MMOL/L (ref 5–15)
BUN SERPL-MCNC: 8 MG/DL (ref 6–20)
BUN/CREAT SERPL: 15 (ref 12–20)
CALCIUM SERPL-MCNC: 9.2 MG/DL (ref 8.5–10.1)
CHLORIDE SERPL-SCNC: 98 MMOL/L (ref 97–108)
CO2 SERPL-SCNC: 30 MMOL/L (ref 21–32)
CREAT SERPL-MCNC: 0.55 MG/DL (ref 0.7–1.3)
GLUCOSE SERPL-MCNC: 99 MG/DL (ref 65–100)
MAGNESIUM SERPL-MCNC: 2 MG/DL (ref 1.6–2.4)
PHOSPHATE SERPL-MCNC: 3.5 MG/DL (ref 2.6–4.7)
POTASSIUM SERPL-SCNC: 4.2 MMOL/L (ref 3.5–5.1)
SODIUM SERPL-SCNC: 133 MMOL/L (ref 136–145)

## 2019-08-18 PROCEDURE — 36415 COLL VENOUS BLD VENIPUNCTURE: CPT

## 2019-08-18 PROCEDURE — 80048 BASIC METABOLIC PNL TOTAL CA: CPT

## 2019-08-18 PROCEDURE — 83735 ASSAY OF MAGNESIUM: CPT

## 2019-08-18 PROCEDURE — 74011250636 HC RX REV CODE- 250/636: Performed by: SURGERY

## 2019-08-18 PROCEDURE — 74011250637 HC RX REV CODE- 250/637: Performed by: SURGERY

## 2019-08-18 PROCEDURE — C9113 INJ PANTOPRAZOLE SODIUM, VIA: HCPCS | Performed by: SURGERY

## 2019-08-18 PROCEDURE — 74011000250 HC RX REV CODE- 250: Performed by: SURGERY

## 2019-08-18 PROCEDURE — 84100 ASSAY OF PHOSPHORUS: CPT

## 2019-08-18 PROCEDURE — 74011250637 HC RX REV CODE- 250/637: Performed by: HOSPITALIST

## 2019-08-18 PROCEDURE — 65270000032 HC RM SEMIPRIVATE

## 2019-08-18 RX ADMIN — DIBASIC SODIUM PHOSPHATE, MONOBASIC POTASSIUM PHOSPHATE AND MONOBASIC SODIUM PHOSPHATE 1 TABLET: 852; 155; 130 TABLET ORAL at 18:58

## 2019-08-18 RX ADMIN — Medication 10 ML: at 22:22

## 2019-08-18 RX ADMIN — Medication 100 MG: at 08:27

## 2019-08-18 RX ADMIN — SODIUM CHLORIDE 40 MG: 9 INJECTION INTRAMUSCULAR; INTRAVENOUS; SUBCUTANEOUS at 08:27

## 2019-08-18 RX ADMIN — Medication 10 ML: at 06:12

## 2019-08-18 RX ADMIN — MORPHINE SULFATE 2 MG: 2 INJECTION, SOLUTION INTRAMUSCULAR; INTRAVENOUS at 19:02

## 2019-08-18 RX ADMIN — SUCRALFATE 1 G: 1 TABLET ORAL at 22:22

## 2019-08-18 RX ADMIN — Medication 10 ML: at 09:10

## 2019-08-18 RX ADMIN — SUCRALFATE 1 G: 1 TABLET ORAL at 15:30

## 2019-08-18 RX ADMIN — SUCRALFATE 1 G: 1 TABLET ORAL at 06:11

## 2019-08-18 RX ADMIN — Medication 10 ML: at 13:16

## 2019-08-18 RX ADMIN — SODIUM CHLORIDE 40 MG: 9 INJECTION INTRAMUSCULAR; INTRAVENOUS; SUBCUTANEOUS at 22:22

## 2019-08-18 RX ADMIN — MORPHINE SULFATE 2 MG: 2 INJECTION, SOLUTION INTRAMUSCULAR; INTRAVENOUS at 06:11

## 2019-08-18 RX ADMIN — MORPHINE SULFATE 2 MG: 2 INJECTION, SOLUTION INTRAMUSCULAR; INTRAVENOUS at 15:40

## 2019-08-18 RX ADMIN — MORPHINE SULFATE 2 MG: 2 INJECTION, SOLUTION INTRAMUSCULAR; INTRAVENOUS at 02:24

## 2019-08-18 RX ADMIN — DIBASIC SODIUM PHOSPHATE, MONOBASIC POTASSIUM PHOSPHATE AND MONOBASIC SODIUM PHOSPHATE 1 TABLET: 852; 155; 130 TABLET ORAL at 08:27

## 2019-08-18 RX ADMIN — THERA TABS 1 TABLET: TAB at 08:27

## 2019-08-18 RX ADMIN — MORPHINE SULFATE 2 MG: 2 INJECTION, SOLUTION INTRAMUSCULAR; INTRAVENOUS at 09:09

## 2019-08-18 RX ADMIN — MORPHINE SULFATE 2 MG: 2 INJECTION, SOLUTION INTRAMUSCULAR; INTRAVENOUS at 12:05

## 2019-08-18 RX ADMIN — SUCRALFATE 1 G: 1 TABLET ORAL at 12:05

## 2019-08-18 RX ADMIN — MORPHINE SULFATE 2 MG: 2 INJECTION, SOLUTION INTRAMUSCULAR; INTRAVENOUS at 22:22

## 2019-08-18 NOTE — PROGRESS NOTES
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 He stated that his swallowing progressively worse for the last three weeks and has lost 15 pounds over three weeks. No fevers, chills, sweating, left side chest pain, palpitation, shortness of breath, abdominal pain, urinary complaint, or abnormal bowel movement. He has on and off intermittent dry cough. No lower extremity swelling. The patient does not want any further chemoradiation and has DNR. He states he had a PEG and a tracheostomy in . Interval history / Subjective:   F/y Dysphagia  Now s/p J tube   Pain controlled with the meds  Tolerating tube feeds, started 8/15  No new issues     Assessment & Plan:      Dysphagia, secondary to esophageal stricture/likely esophageal carcinoma  -CXR empysema  -CT Neck  New masslike circumferential wall thickening of the lower cervical and upper  thoracic esophagus as described above, with severe narrowing/near occlusion of the esophagus at the level of the thoracic inlet, highly suspicious for esophageal carcinoma. Endoscopy is recommended. Area of irregular heterogeneous enhancement in the anterior supraglottic larynx measuring 1.0 x 0.6 cm, which may represent treatment-related changes or tumor recurrence. No evidence of cervical lymphadenopathy.   Severe calcific atherosclerosis of the carotid bifurcations with at least mild stenosis of the proximal bilateral internal carotid arteries. Moderate centrilobular emphysema. Spiculated subpleural nodular opacity in the right upper lobe measuring 10 mm, which appears stable. follow-up chest CT in 6-12 months is recommended. New mild superior endplate compression deformities of the T4-T6 vertebral bodies since 2015, though these appear to be chronic. Chronic left mandibular condyle fracture  -seen by SLP for swallow eval but deemed not safe to test. s/o as dysphagia sec to esophageal cancer recurrence  -s/p J tube 8/14, started tube feeds 8/15  -Monitoring for refeeding    Esophageal mass, with history of base of tongue cancer with possible metastasis  -history of radiation and chemotherapy, and he had history of tracheostomy and PEG tube, which he has used for eight months. The patient does not want any further chemo or radiation treatment.    -Please see Palliative note: Patient clear he does not want oncology or radiation oncology. The patient is ok with PEG tube  -prev under onc [facility] Car[facility] S[facility] but has not seen since 2014  -palliative care c/s. Pt is DNR    Right arm swelling  -likely sec to infiltration of fluids  -no need to look for DVT as would not change the management  -now resolved    Severe protein-calorie malnutrition, secondary to dysphagia and underlying malignancy. -GI and  Nutritionist following.  -tube feeds started 8/15  -advance as per RD rec    Chronic T4, T6 compression deformity, stable.    -noted to be chronic findings on CT as above  - p.r.n. Tylenol and morphine. COPD, stable.    -normal O2 sat on room air.   p.r.n. DuoNebs    Hypertension.    -p.r.n. IV hydralazine. Hypokalemia  -replace as needed     DVT prophylaxis. scd  Code: DNR   Functional status: independent with walker    Plan: Advance tube feeds as recommended by dietician.  Discharge on 8/19 if remains stable    Care Plan discussed with: Patient/Family  Disposition: home when able     Hospital Problems  Date Reviewed: 8/14/2019 Codes Class Noted POA    * (Principal) Dysphagia ICD-10-CM: R13.10  ICD-9-CM: 787.20  8/7/2019 Unknown                Review of Systems:   A comprehensive review of systems was negative except for that written in the HPI. Vital Signs:    Last 24hrs VS reviewed since prior progress note. Most recent are:  Visit Vitals  /78 (BP 1 Location: Right arm, BP Patient Position: At rest)   Pulse 80   Temp 98.3 °F (36.8 °C)   Resp 18   Ht 5' 5.98\" (1.676 m)   Wt 47.5 kg (104 lb 12.8 oz)   SpO2 96%   BMI 16.92 kg/m²         Intake/Output Summary (Last 24 hours) at 8/18/2019 1109  Last data filed at 8/18/2019 1000  Gross per 24 hour   Intake 90 ml   Output 200 ml   Net -110 ml        Physical Examination:             Constitutional:  No acute distress, cooperative, pleasant ,cachectic. Speech impaired from prior radiation   ENT:  Oral mucous moist, oropharynx benign. Neck supple,    Resp:  CTA bilaterally. No wheezing/rhonchi/rales. No accessory muscle use   CV:  Regular rhythm, normal rate, no murmurs, gallops, rubs    GI:  Soft, non distended, non tender. normoactive bowel sounds, no hepatosplenomegaly     Musculoskeletal:  No edema, warm, 2+ pulses throughout    Neurologic:  Moves all extremities. AAOx3, CN II-XII reviewed            Data Review:    Review and/or order of clinical lab test      Labs:     No results for input(s): WBC, HGB, HCT, PLT, HGBEXT, HCTEXT, PLTEXT, HGBEXT, HCTEXT, PLTEXT in the last 72 hours. Recent Labs     08/18/19  0224 08/17/19  0434 08/16/19  0116   * 134* 134*   K 4.2 3.9 3.9   CL 98 96* 101   CO2 30 29 29   BUN 8 5* 4*   CREA 0.55* 0.56* 0.60*   GLU 99 95 118*   CA 9.2 9.7 9.3   MG 2.0 1.9 1.6   PHOS 3.5 3.3 2.5*     Recent Labs     08/16/19  0116   SGOT 10*   ALT 13   AP 92   TBILI 0.3   TP 6.8   ALB 3.0*   GLOB 3.8     No results for input(s): INR, PTP, APTT in the last 72 hours.     No lab exists for component: INREXT, INREXT   No results for input(s): FE, TIBC, PSAT, FERR in the last 72 hours. Lab Results   Component Value Date/Time    Folate 10.0 01/07/2016 08:41 AM      No results for input(s): PH, PCO2, PO2 in the last 72 hours. No results for input(s): CPK, CKNDX, TROIQ in the last 72 hours.     No lab exists for component: CPKMB  No results found for: CHOL, CHOLX, CHLST, CHOLV, HDL, LDL, LDLC, DLDLP, TGLX, TRIGL, TRIGP, CHHD, CHHDX  No results found for: [facility]  Lab Results   Component Value Date/Time    Color YELLOW/STRAW 06/07/2018 09:50 AM    Appearance CLEAR 06/07/2018 09:50 AM    Specific gravity >1.030 (H) 06/07/2018 09:50 AM    Specific gravity >1.030 (H) 06/07/2018 09:50 AM    pH (UA) 5.5 06/07/2018 09:50 AM    Protein NEGATIVE  06/07/2018 09:50 AM    Glucose NEGATIVE  06/07/2018 09:50 AM    Ketone NEGATIVE  06/07/2018 09:50 AM    Bilirubin NEGATIVE  06/07/2018 09:50 AM    Urobilinogen 0.2 06/07/2018 09:50 AM    Nitrites NEGATIVE  06/07/2018 09:50 AM    Leukocyte Esterase NEGATIVE  06/07/2018 09:50 AM    Epithelial cells FEW 06/07/2018 09:50 AM    Bacteria NEGATIVE  06/07/2018 09:50 AM    WBC 0-4 06/07/2018 09:50 AM    RBC 0-5 06/07/2018 09:50 AM         Medications Reviewed:     Current Facility-Administered Medications   Medication Dose Route Frequency    phosphorus (K PHOS NEUTRAL) 250 mg tablet 1 Tab  1 Tab Per J Tube BID    sodium chloride (NS) flush 5-40 mL  5-40 mL IntraVENous PRN    thiamine HCL (B-1) tablet 100 mg  100 mg Per G Tube DAILY    therapeutic multivitamin (THERAGRAN) tablet 1 Tab  1 Tab Oral DAILY    pantoprazole (PROTONIX) 40 mg in sodium chloride 0.9% 10 mL injection  40 mg IntraVENous Q12H    sucralfate (CARAFATE) tablet 1 g  1 g Oral AC&HS    morphine injection 2 mg  2 mg IntraVENous Q3H PRN    sodium chloride (NS) flush 5-40 mL  5-40 mL IntraVENous Q8H    sodium chloride (NS) flush 5-40 mL  5-40 mL IntraVENous PRN    acetaminophen (TYLENOL) tablet 650 mg  650 mg Oral Q4H PRN    HYDROcodone-acetaminophen (NORCO) 5-325 mg per tablet 1 Tab  1 Tab Oral Q4H PRN    hydrALAZINE (APRESOLINE) 20 mg/mL injection 20 mg  20 mg IntraVENous Q6H PRN    albuterol-ipratropium (DUO-NEB) 2.5 MG-0.5 MG/3 ML  3 mL Nebulization Q6H PRN    ondansetron (ZOFRAN) injection 4 mg  4 mg IntraVENous Q4H PRN     ______________________________________________________________________  EXPECTED LENGTH OF STAY: 3d Patricia Mcleod MD

## 2019-08-19 PROCEDURE — 74011250637 HC RX REV CODE- 250/637: Performed by: HOSPITALIST

## 2019-08-19 PROCEDURE — 74011250637 HC RX REV CODE- 250/637: Performed by: SURGERY

## 2019-08-19 PROCEDURE — 74011250636 HC RX REV CODE- 250/636: Performed by: SURGERY

## 2019-08-19 PROCEDURE — C9113 INJ PANTOPRAZOLE SODIUM, VIA: HCPCS | Performed by: SURGERY

## 2019-08-19 PROCEDURE — 74011000250 HC RX REV CODE- 250: Performed by: SURGERY

## 2019-08-19 PROCEDURE — 65270000032 HC RM SEMIPRIVATE

## 2019-08-19 RX ORDER — SUCRALFATE 1 G/1
1 TABLET ORAL
Status: DISCONTINUED | OUTPATIENT
Start: 2019-08-19 | End: 2019-08-20 | Stop reason: HOSPADM

## 2019-08-19 RX ORDER — MORPHINE SULFATE ORAL SOLUTION 10 MG/5ML
2.5 SOLUTION ORAL
Status: DISCONTINUED | OUTPATIENT
Start: 2019-08-19 | End: 2019-08-19 | Stop reason: SDUPTHER

## 2019-08-19 RX ORDER — SUCRALFATE 1 G/1
1 TABLET ORAL
Qty: 120 TAB | Refills: 2 | Status: SHIPPED | OUTPATIENT
Start: 2019-08-19 | End: 2019-08-20

## 2019-08-19 RX ORDER — THERA TABS 400 MCG
1 TAB ORAL DAILY
Qty: 30 TAB | Refills: 0 | Status: SHIPPED | OUTPATIENT
Start: 2019-08-20

## 2019-08-19 RX ORDER — MORPHINE SULFATE 20 MG/ML
2.5 SOLUTION ORAL
Status: DISCONTINUED | OUTPATIENT
Start: 2019-08-19 | End: 2019-08-20 | Stop reason: HOSPADM

## 2019-08-19 RX ORDER — LANOLIN ALCOHOL/MO/W.PET/CERES
100 CREAM (GRAM) TOPICAL DAILY
Qty: 5 TAB | Refills: 0 | Status: SHIPPED | OUTPATIENT
Start: 2019-08-20

## 2019-08-19 RX ADMIN — SUCRALFATE 1 G: 1 TABLET ORAL at 15:35

## 2019-08-19 RX ADMIN — THERA TABS 1 TABLET: TAB at 09:18

## 2019-08-19 RX ADMIN — Medication 10 ML: at 22:59

## 2019-08-19 RX ADMIN — MORPHINE SULFATE 2 MG: 2 INJECTION, SOLUTION INTRAMUSCULAR; INTRAVENOUS at 09:18

## 2019-08-19 RX ADMIN — MORPHINE SULFATE 2.6 MG: 20 SOLUTION ORAL at 22:54

## 2019-08-19 RX ADMIN — MORPHINE SULFATE 2 MG: 2 INJECTION, SOLUTION INTRAMUSCULAR; INTRAVENOUS at 02:50

## 2019-08-19 RX ADMIN — SODIUM CHLORIDE 40 MG: 9 INJECTION INTRAMUSCULAR; INTRAVENOUS; SUBCUTANEOUS at 09:18

## 2019-08-19 RX ADMIN — SUCRALFATE 1 G: 1 TABLET ORAL at 06:17

## 2019-08-19 RX ADMIN — MORPHINE SULFATE 2 MG: 2 INJECTION, SOLUTION INTRAMUSCULAR; INTRAVENOUS at 06:17

## 2019-08-19 RX ADMIN — SUCRALFATE 1 G: 1 TABLET ORAL at 11:07

## 2019-08-19 RX ADMIN — Medication 10 ML: at 13:30

## 2019-08-19 RX ADMIN — SUCRALFATE 1 G: 1 TABLET ORAL at 22:54

## 2019-08-19 RX ADMIN — Medication 10 ML: at 06:17

## 2019-08-19 RX ADMIN — MORPHINE SULFATE 2.5 MG: 10 SOLUTION ORAL at 15:35

## 2019-08-19 RX ADMIN — DIBASIC SODIUM PHOSPHATE, MONOBASIC POTASSIUM PHOSPHATE AND MONOBASIC SODIUM PHOSPHATE 1 TABLET: 852; 155; 130 TABLET ORAL at 09:18

## 2019-08-19 RX ADMIN — DIBASIC SODIUM PHOSPHATE, MONOBASIC POTASSIUM PHOSPHATE AND MONOBASIC SODIUM PHOSPHATE 1 TABLET: 852; 155; 130 TABLET ORAL at 17:48

## 2019-08-19 RX ADMIN — Medication 100 MG: at 09:18

## 2019-08-19 NOTE — DISCHARGE INSTRUCTIONS
Discharge Instructions       PATIENT ID: Mansi Wynne  MRN: [id number]   YOB: 1960    DATE OF ADMISSION: 8/7/2019  9:16 PM    DATE OF DISCHARGE: 8/19/2019    PRIMARY CARE PROVIDER: Latonia Pitts MD     ATTENDING PHYSICIAN: Krystian Garber MD  DISCHARGING PROVIDER: Hnog Calderon MD    To contact this individual call [phone] and ask the  to page. If unavailable ask to be transferred the Adult Hospitalist Department. DISCHARGE DIAGNOSES   Dysphagia    CONSULTATIONS: IP CONSULT TO GASTROENTEROLOGY    PROCEDURES/SURGERIES: Procedure(s):  INSERTION GASTROSTOMY TUBE    PENDING TEST RESULTS:   At the time of discharge the following test results are still pending: none    FOLLOW UP APPOINTMENTS:   Follow-up Information     Follow up With Specialties Details Why Contact Info    Latonia Pitts MD [facility] In 1 week  [address] [facility]  [phone]             ADDITIONAL CARE RECOMMENDATIONS:   Follow up with PMD    DIET: PEG feeding    ACTIVITY: Activity as tolerated      DISCHARGE MEDICATIONS:   See Medication Reconciliation Form    · It is important that you take the medication exactly as they are prescribed. · Keep your medication in the bottles provided by the pharmacist and keep a list of the medication names, dosages, and times to be taken in your wallet. · Do not take other medications without consulting your doctor. NOTIFY YOUR PHYSICIAN FOR ANY OF THE FOLLOWING:   Fever over 101 degrees for 24 hours. Chest pain, shortness of breath, fever, chills, nausea, vomiting, diarrhea, change in mentation, falling, weakness, bleeding. Severe pain or pain not relieved by medications. Or, any other signs or symptoms that you may have questions about.       DISPOSITION:  x  Home With:   OT  PT  HH  RN       SNF/Inpatient Rehab/LTAC    Independent/assisted living    Hospice    Other:     CDMP Checked:   Yes x     PROBLEM LIST Updated:  Yes x Signed:   Kiah Whittington MD  8/19/2019  9:38 AM

## 2019-08-19 NOTE — PROGRESS NOTES
Dietician offered resource [employer] Patient Assistance Program.  Placed form on chart for MD to sign. Reviewed [employer] response related to enteral feeds - are NOT covered. Sent referral to [place] to have benefit checked. Assisted patient with the application for assistance through [employer].     Koko Larios RN, BSN, [facility]  ED Care Management  [phone]

## 2019-08-19 NOTE — PROGRESS NOTES
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 He stated that his swallowing progressively worse for the last three weeks and has lost 15 pounds over three weeks. No fevers, chills, sweating, left side chest pain, palpitation, shortness of breath, abdominal pain, urinary complaint, or abnormal bowel movement. He has on and off intermittent dry cough. No lower extremity swelling. The patient does not want any further chemoradiation and has DNR. He states he had a PEG and a tracheostomy in . Interval history / Subjective:   F/y Dysphagia  Now s/p J tube   Pain controlled with the meds  Tolerating tube feeds, started 8/15  Per CM, the patient does not have out of hospital coverage     Assessment & Plan:      Dysphagia, secondary to esophageal stricture/likely esophageal carcinoma  -CXR empysema  -CT Neck  New masslike circumferential wall thickening of the lower cervical and upper  thoracic esophagus as described above, with severe narrowing/near occlusion of the esophagus at the level of the thoracic inlet, highly suspicious for esophageal carcinoma. Endoscopy is recommended. Area of irregular heterogeneous enhancement in the anterior supraglottic larynx measuring 1.0 x 0.6 cm, which may represent treatment-related changes or tumor recurrence. No evidence of cervical lymphadenopathy.   Severe calcific atherosclerosis of the carotid bifurcations with at least mild stenosis of the proximal bilateral internal carotid arteries. Moderate centrilobular emphysema. Spiculated subpleural nodular opacity in the right upper lobe measuring 10 mm, which appears stable. follow-up chest CT in 6-12 months is recommended. New mild superior endplate compression deformities of the T4-T6 vertebral bodies since 2015, though these appear to be chronic. Chronic left mandibular condyle fracture  -seen by SLP for swallow eval but deemed not safe to test. s/o as dysphagia sec to esophageal cancer recurrence  -s/p J tube 8/14, started tube feeds 8/15  -Monitoring for refeeding    Esophageal mass, with history of base of tongue cancer with possible metastasis  -history of radiation and chemotherapy, and he had history of tracheostomy and PEG tube, which he has used for eight months. The patient does not want any further chemo or radiation treatment.    -Please see Palliative note: Patient clear he does not want oncology or radiation oncology. The patient is ok with PEG tube  -prev under onc [facility] Dayday Del [facility] but has not seen since 2014  -palliative care c/s.   -Pt is DNR    Right arm swelling  -likely sec to infiltration of fluids  -no need to look for DVT as would not change the management  -now resolved    Severe protein-calorie malnutrition, secondary to dysphagia and underlying malignancy. -GI and  Nutritionist following.  -tube feeds started 8/15  -advance as per RD rec    Chronic T4, T6 compression deformity, stable.    -noted to be chronic findings on CT as above  - p.r.n. Tylenol and morphine. COPD, stable.    -normal O2 sat on room air.   p.r.n. DuoNebs    Hypertension.    -p.r.n. IV hydralazine. Hypokalemia  -replace as needed     DVT prophylaxis. scd  Code: DNR   Functional status: independent with walker    Plan: The patient is medically stable for discharge.  Awaiting  arrangement for out of hospital services    Care Plan discussed with: Patient/Family  Disposition: home when able     Hospital Problems  Date Reviewed: 8/14/2019          Codes Class Noted POA    * (Principal) Dysphagia ICD-10-CM: R13.10  ICD-9-CM: 787.20  8/7/2019 Unknown                Review of Systems:   A comprehensive review of systems was negative except for that written in the HPI. Vital Signs:    Last 24hrs VS reviewed since prior progress note. Most recent are:  Visit Vitals  /68   Pulse 82   Temp 98 °F (36.7 °C)   Resp 18   Ht 5' 5.98\" (1.676 m)   Wt 47.5 kg (104 lb 12.8 oz)   SpO2 97%   BMI 16.92 kg/m²         Intake/Output Summary (Last 24 hours) at 8/19/2019 0929  Last data filed at 8/19/2019 0842  Gross per 24 hour   Intake 360 ml   Output 350 ml   Net 10 ml        Physical Examination:             Constitutional:  No acute distress, cooperative, pleasant ,cachectic. Speech impaired from prior radiation   ENT:  Oral mucous moist, oropharynx benign. Neck supple,    Resp:  CTA bilaterally. No wheezing/rhonchi/rales. No accessory muscle use   CV:  Regular rhythm, normal rate, no murmurs, gallops, rubs    GI:  Soft, non distended, non tender. normoactive bowel sounds, no hepatosplenomegaly     Musculoskeletal:  No edema, warm, 2+ pulses throughout    Neurologic:  Moves all extremities. AAOx3, CN II-XII reviewed            Data Review:    Review and/or order of clinical lab test      Labs:     No results for input(s): WBC, HGB, HCT, PLT, HGBEXT, HCTEXT, PLTEXT, HGBEXT, HCTEXT, PLTEXT in the last 72 hours. Recent Labs     08/18/19  0224 08/17/19  0434   * 134*   K 4.2 3.9   CL 98 96*   CO2 30 29   BUN 8 5*   CREA 0.55* 0.56*   GLU 99 95   CA 9.2 9.7   MG 2.0 1.9   PHOS 3.5 3.3     No results for input(s): SGOT, GPT, ALT, AP, TBIL, TBILI, TP, ALB, GLOB, GGT, AML, LPSE in the last 72 hours. No lab exists for component: AMYP, HLPSE  No results for input(s): INR, PTP, APTT in the last 72 hours.     No lab exists for component: INREXT, INREXT   No results for input(s): FE, TIBC, PSAT, FERR in the last 72 hours. Lab Results   Component Value Date/Time    Folate 10.0 01/07/2016 08:41 AM      No results for input(s): PH, PCO2, PO2 in the last 72 hours. No results for input(s): CPK, CKNDX, TROIQ in the last 72 hours.     No lab exists for component: CPKMB  No results found for: CHOL, CHOLX, CHLST, CHOLV, HDL, LDL, LDLC, DLDLP, TGLX, TRIGL, TRIGP, CHHD, CHHDX  No results found for: [facility]  Lab Results   Component Value Date/Time    Color YELLOW/STRAW 06/07/2018 09:50 AM    Appearance CLEAR 06/07/2018 09:50 AM    Specific gravity >1.030 (H) 06/07/2018 09:50 AM    Specific gravity >1.030 (H) 06/07/2018 09:50 AM    pH (UA) 5.5 06/07/2018 09:50 AM    Protein NEGATIVE  06/07/2018 09:50 AM    Glucose NEGATIVE  06/07/2018 09:50 AM    Ketone NEGATIVE  06/07/2018 09:50 AM    Bilirubin NEGATIVE  06/07/2018 09:50 AM    Urobilinogen 0.2 06/07/2018 09:50 AM    Nitrites NEGATIVE  06/07/2018 09:50 AM    Leukocyte Esterase NEGATIVE  06/07/2018 09:50 AM    Epithelial cells FEW 06/07/2018 09:50 AM    Bacteria NEGATIVE  06/07/2018 09:50 AM    WBC 0-4 06/07/2018 09:50 AM    RBC 0-5 06/07/2018 09:50 AM         Medications Reviewed:     Current Facility-Administered Medications   Medication Dose Route Frequency    phosphorus (K PHOS NEUTRAL) 250 mg tablet 1 Tab  1 Tab Per J Tube BID    sodium chloride (NS) flush 5-40 mL  5-40 mL IntraVENous PRN    thiamine HCL (B-1) tablet 100 mg  100 mg Per G Tube DAILY    therapeutic multivitamin (THERAGRAN) tablet 1 Tab  1 Tab Oral DAILY    pantoprazole (PROTONIX) 40 mg in sodium chloride 0.9% 10 mL injection  40 mg IntraVENous Q12H    sucralfate (CARAFATE) tablet 1 g  1 g Oral AC&HS    morphine injection 2 mg  2 mg IntraVENous Q3H PRN    sodium chloride (NS) flush 5-40 mL  5-40 mL IntraVENous Q8H    sodium chloride (NS) flush 5-40 mL  5-40 mL IntraVENous PRN    acetaminophen (TYLENOL) tablet 650 mg  650 mg Oral Q4H PRN    HYDROcodone-acetaminophen (NORCO) 5-325 mg per tablet 1 Tab  1 Tab Oral Q4H PRN    hydrALAZINE (APRESOLINE) 20 mg/mL injection 20 mg  20 mg IntraVENous Q6H PRN    albuterol-ipratropium (DUO-NEB) 2.5 MG-0.5 MG/3 ML  3 mL Nebulization Q6H PRN    ondansetron (ZOFRAN) injection 4 mg  4 mg IntraVENous Q4H PRN     ______________________________________________________________________  EXPECTED LENGTH OF STAY: 3d 16h         Rebecca Smith MD

## 2019-08-19 NOTE — PROGRESS NOTES
NUTRITION         Approached by charge RN regarding pt's d/c plan as it has been determined that his current insurance [employer]) will not cover pt's tube feeding supplies upon discharge. He is unable to afford them out of pocket. I provided CM with information regarding [employer] patient assistance program and she will look into this as an option for pt.     Abiola Rosario RD

## 2019-08-20 VITALS
OXYGEN SATURATION: 97 % | BODY MASS INDEX: 16.84 KG/M2 | TEMPERATURE: 97.9 F | DIASTOLIC BLOOD PRESSURE: 66 MMHG | SYSTOLIC BLOOD PRESSURE: 119 MMHG | HEART RATE: 89 BPM | RESPIRATION RATE: 18 BRPM | HEIGHT: 66 IN | WEIGHT: 104.8 LBS

## 2019-08-20 PROCEDURE — 74011250637 HC RX REV CODE- 250/637: Performed by: SURGERY

## 2019-08-20 PROCEDURE — 74011250637 HC RX REV CODE- 250/637: Performed by: HOSPITALIST

## 2019-08-20 RX ORDER — SUCRALFATE 1 G/1
1 TABLET ORAL
Qty: 120 TAB | Refills: 2 | Status: SHIPPED | OUTPATIENT
Start: 2019-08-20

## 2019-08-20 RX ORDER — MORPHINE SULFATE 20 MG/ML
2.5 SOLUTION ORAL
Qty: 1 BOTTLE | Refills: 0 | Status: SHIPPED | OUTPATIENT
Start: 2019-08-20 | End: 2019-08-23

## 2019-08-20 RX ADMIN — THERA TABS 1 TABLET: TAB at 09:16

## 2019-08-20 RX ADMIN — Medication 100 MG: at 09:16

## 2019-08-20 RX ADMIN — MORPHINE SULFATE 2.6 MG: 20 SOLUTION ORAL at 10:01

## 2019-08-20 RX ADMIN — SUCRALFATE 1 G: 1 TABLET ORAL at 06:19

## 2019-08-20 RX ADMIN — MORPHINE SULFATE 2.6 MG: 20 SOLUTION ORAL at 06:19

## 2019-08-20 RX ADMIN — Medication 10 ML: at 06:19

## 2019-08-20 RX ADMIN — PANTOPRAZOLE SODIUM 40 MG: 40 TABLET, DELAYED RELEASE ORAL at 07:19

## 2019-08-20 RX ADMIN — DIBASIC SODIUM PHOSPHATE, MONOBASIC POTASSIUM PHOSPHATE AND MONOBASIC SODIUM PHOSPHATE 1 TABLET: 852; 155; 130 TABLET ORAL at 09:16

## 2019-08-20 RX ADMIN — SUCRALFATE 1 G: 1 TABLET ORAL at 11:32

## 2019-08-20 NOTE — PROGRESS NOTES
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 He stated that his swallowing progressively worse for the last three weeks and has lost 15 pounds over three weeks. No fevers, chills, sweating, left side chest pain, palpitation, shortness of breath, abdominal pain, urinary complaint, or abnormal bowel movement. He has on and off intermittent dry cough. No lower extremity swelling. The patient does not want any further chemoradiation and has DNR. He states he had a PEG and a tracheostomy in . Interval history / Subjective:   F/y Dysphagia  Now s/p J tube   Pain controlled with the meds that he is receiving through J tube  Tolerating tube feeds, started 8/15  Per CM, the patient has coverage and can be discharged     Assessment & Plan:      Dysphagia, secondary to esophageal stricture/likely esophageal carcinoma  -CXR empysema  -CT Neck  New masslike circumferential wall thickening of the lower cervical and upper  thoracic esophagus as described above, with severe narrowing/near occlusion of the esophagus at the level of the thoracic inlet, highly suspicious for esophageal carcinoma. Endoscopy is recommended. Area of irregular heterogeneous enhancement in the anterior supraglottic larynx measuring 1.0 x 0.6 cm, which may represent treatment-related changes or tumor recurrence. No evidence of cervical lymphadenopathy.   Severe calcific atherosclerosis of the carotid bifurcations with at least mild stenosis of the proximal bilateral internal carotid arteries. Moderate centrilobular emphysema. Spiculated subpleural nodular opacity in the right upper lobe measuring 10 mm, which appears stable. follow-up chest CT in 6-12 months is recommended. New mild superior endplate compression deformities of the T4-T6 vertebral bodies since 2015, though these appear to be chronic. Chronic left mandibular condyle fracture  -seen by SLP for swallow eval but deemed not safe to test. s/o as dysphagia sec to esophageal cancer recurrence  -s/p J tube 8/14, started tube feeds 8/15  -Tolerating very well    Esophageal mass, with history of base of tongue cancer with possible metastasis  -history of radiation and chemotherapy, and he had history of tracheostomy and PEG tube, which he has used for eight months. The patient does not want any further chemo or radiation treatment.    -Please see Palliative note: Patient clear he does not want oncology or radiation oncology. The patient is ok with PEG tube  -[facility] onc [facility] but has not seen since 2014  -palliative care c/s.   -Pt is DNR    Right arm swelling  -likely sec to infiltration of fluids  -no need to look for DVT as would not change the management  -now resolved    Severe protein-calorie malnutrition, secondary to dysphagia and underlying malignancy. -GI and  Nutritionist following.  -tube feeds started 8/15  -advance as per RD rec    Chronic T4, T6 compression deformity, stable.    -noted to be chronic findings on CT as above  - p.r.n. Tylenol and morphine. COPD, stable.    -normal O2 sat on room air.   p.r.n. DuoNebs    Hypertension.    -p.r.n. IV hydralazine. Hypokalemia  -replace as needed     DVT prophylaxis.   scd  Code: DNR   Functional status: independent with walker    Plan: The patient is medically stable for discharge    Care Plan discussed with: Patient/Family  Disposition: home today Hospital Problems  Date Reviewed: 8/14/2019          Codes Class Noted POA    * (Principal) Dysphagia ICD-10-CM: R13.10  ICD-9-CM: 787.20  8/7/2019 Unknown                Review of Systems:   A comprehensive review of systems was negative except for that written in the HPI. Vital Signs:    Last 24hrs VS reviewed since prior progress note. Most recent are:  Visit Vitals  /71   Pulse 84   Temp 98 °F (36.7 °C)   Resp 18   Ht 5' 5.98\" (1.676 m)   Wt 47.5 kg (104 lb 12.8 oz)   SpO2 98%   BMI 16.92 kg/m²         Intake/Output Summary (Last 24 hours) at 8/20/2019 1211  Last data filed at 8/20/2019 0719  Gross per 24 hour   Intake 420 ml   Output 1500 ml   Net -1080 ml        Physical Examination:             Constitutional:  No acute distress, cooperative, pleasant ,cachectic. Speech impaired from prior radiation   ENT:  Oral mucous moist, oropharynx benign. Neck supple,    Resp:  CTA bilaterally. No wheezing/rhonchi/rales. No accessory muscle use   CV:  Regular rhythm, normal rate, no murmurs, gallops, rubs    GI:  Soft, non distended, non tender. normoactive bowel sounds, no hepatosplenomegaly     Musculoskeletal:  No edema, warm, 2+ pulses throughout    Neurologic:  Moves all extremities. AAOx3, CN II-XII reviewed            Data Review:    Review and/or order of clinical lab test      Labs:     No results for input(s): WBC, HGB, HCT, PLT, HGBEXT, HCTEXT, PLTEXT, HGBEXT, HCTEXT, PLTEXT in the last 72 hours. Recent Labs     08/18/19  0224   *   K 4.2   CL 98   CO2 30   BUN 8   CREA 0.55*   GLU 99   CA 9.2   MG 2.0   PHOS 3.5     No results for input(s): SGOT, GPT, ALT, AP, TBIL, TBILI, TP, ALB, GLOB, GGT, AML, LPSE in the last 72 hours. No lab exists for component: AMYP, HLPSE  No results for input(s): INR, PTP, APTT in the last 72 hours. No lab exists for component: INREXT, INREXT   No results for input(s): FE, TIBC, PSAT, FERR in the last 72 hours.    Lab Results   Component Value Date/Time Folate 10.0 01/07/2016 08:41 AM      No results for input(s): PH, PCO2, PO2 in the last 72 hours. No results for input(s): CPK, CKNDX, TROIQ in the last 72 hours.     No lab exists for component: CPKMB  No results found for: CHOL, CHOLX, CHLST, CHOLV, HDL, LDL, LDLC, DLDLP, TGLX, TRIGL, TRIGP, CHHD, CHHDX  No results found for: Jose Miguel Kelley  Lab Results   Component Value Date/Time    Color YELLOW/STRAW 06/07/2018 09:50 AM    Appearance CLEAR 06/07/2018 09:50 AM    Specific gravity >1.030 (H) 06/07/2018 09:50 AM    Specific gravity >1.030 (H) 06/07/2018 09:50 AM    pH (UA) 5.5 06/07/2018 09:50 AM    Protein NEGATIVE  06/07/2018 09:50 AM    Glucose NEGATIVE  06/07/2018 09:50 AM    Ketone NEGATIVE  06/07/2018 09:50 AM    Bilirubin NEGATIVE  06/07/2018 09:50 AM    Urobilinogen 0.2 06/07/2018 09:50 AM    Nitrites NEGATIVE  06/07/2018 09:50 AM    Leukocyte Esterase NEGATIVE  06/07/2018 09:50 AM    Epithelial cells FEW 06/07/2018 09:50 AM    Bacteria NEGATIVE  06/07/2018 09:50 AM    WBC 0-4 06/07/2018 09:50 AM    RBC 0-5 06/07/2018 09:50 AM         Medications Reviewed:     Current Facility-Administered Medications   Medication Dose Route Frequency    pantoprazole (PROTONIX) 2 mg/mL oral suspension 40 mg  40 mg Per J Tube ACB    sucralfate (CARAFATE) tablet 1 g  1 g Per J Tube AC&HS    morphine (ROXANOL) 100 mg/5 mL (20 mg/mL) concentrated solution 2.6 mg  2.6 mg Oral Q4H PRN    phosphorus (K PHOS NEUTRAL) 250 mg tablet 1 Tab  1 Tab Per J Tube BID    sodium chloride (NS) flush 5-40 mL  5-40 mL IntraVENous PRN    thiamine HCL (B-1) tablet 100 mg  100 mg Per G Tube DAILY    therapeutic multivitamin (THERAGRAN) tablet 1 Tab  1 Tab Oral DAILY    sodium chloride (NS) flush 5-40 mL  5-40 mL IntraVENous Q8H    sodium chloride (NS) flush 5-40 mL  5-40 mL IntraVENous PRN    acetaminophen (TYLENOL) tablet 650 mg  650 mg Oral Q4H PRN    HYDROcodone-acetaminophen (NORCO) 5-325 mg per tablet 1 Tab  1 Tab Oral Q4H PRN    hydrALAZINE (APRESOLINE) 20 mg/mL injection 20 mg  20 mg IntraVENous Q6H PRN    albuterol-ipratropium (DUO-NEB) 2.5 MG-0.5 MG/3 ML  3 mL Nebulization Q6H PRN    ondansetron (ZOFRAN) injection 4 mg  4 mg IntraVENous Q4H PRN     ______________________________________________________________________  EXPECTED LENGTH OF STAY: 3d Carrington Major MD

## 2019-08-20 NOTE — PROGRESS NOTES
Hospital follow-up PCP transitional care appointment has been scheduled with Dr. Haley Jackson for Thursday, 8/29/19 at 2:20 p.m. Pending patient discharge.   Mel Gaviria, Care Management Specialist.

## 2019-08-20 NOTE — PROGRESS NOTES
CM noted form placed on chart to assist with formula per problems with Medicaid covering. CM discussed form with MD. MD completed portion. CM reviewing document to make sure all sections have been completed then will fax to [employer] ([phone]). Per form patient needs to provide proof of income. CM visiting patient to discuss and obtain a time frame for obtaining either paystub, w2, or tax return. CM sent email to [employer] to check the patients medicaid. CM concerned that patient may have a medicaid error that will need to be resolved by the patients worker and department of . CM awaiting a response will monitor for updates. CM received visit from [place] who advised that the patient has been accepted with [facility] at 100% for formula and tube feedings. [facility] is meeting with patient to for teachings and education. There are no care management barriers at this time. CM informed MD. Patient likely to discharge today if medically ready. CM also received message from [address] that advised that the patient Medicaid became \"DOC Inpatient\" which means that the patient was an inmate and Medicaid only covered inpatient services. If this is an error, the patient will need to work with his worker to resolve. CM will provide information for the patient to contact [facility]. [facility] for PEG site. management. [facility] is unable to accept per out of Network with Medicaid and [facility] advised that they were willing to accept the patient but PCP advised that patient hasnt been seen over a year, patient will need to see PCP to obtain [facility] sign off. CM informed patient, patient advised he doesn't need home health to manage PEG cite. Patients nurse will provide teaching to assist the patient when in the home. CM is working on transportation per the patient lives in [address] [facility] from [address]  During assessment CM noted that patients brother may be able to pick him up. CM will follow. Patients brother unable to transport the patient. CM attempt to arrange Medicaid transportation. Medicaid advised coverage not active. CM arranged transportation via round trip for 4:15 transport. Nurse staff informed.      CM will follow  LATANYA Palacio/LISA

## 2019-08-20 NOTE — PROGRESS NOTES
Mr. Narvaez Case has no complaints today. Tolerating tube feeds. Tm 98.7 HR: 84 BP: 111/71 Resp Rate: 18 98% sat on room air. Intake/Output Summary (Last 24 hours) at 8/20/2019 1006  Last data filed at 8/20/2019 0719  Gross per 24 hour   Intake 510 ml   Output 1500 ml   Net -990 ml   Exam: Cor: RRR. Lungs: Bilateral breath sounds. Clear to auscultation. Abd: Soft. Non tender. Non distended. Incision is clean. Gastrostomy tube site is clean. Labs: No results found for this or any previous visit (from the past 12 hour(s)). Doing well following gastrostomy tube placement. Continue bolus tube feeds as tolerated. Plans per Dr. Sharona He. Following.

## 2019-08-21 NOTE — PROGRESS NOTES
CM received call from Cassidy Mcintyre asking if we can send the referral for hospice per the patients spouse advised during her home health visit that the patient is in pain and she was under the impression that the patient would be coming home with hospice. CM advised that hospice was not apart of the patients disposition for discharge. Bicske with [address] advised that she will attempt to obtain a hospice referral via patients PCP and appreciated the return call.      Brandyn Gann, FIORELLAA/CRM

## 2019-08-24 NOTE — DISCHARGE SUMMARY
Discharge Summary       PATIENT ID: Dc Rosa  MRN: [id number]   YOB: 1960    DATE OF ADMISSION: 8/7/2019  9:16 PM    DATE OF DISCHARGE: 8/20/2019   PRIMARY CARE PROVIDER: Vivek Bhatia MD     ATTENDING PHYSICIAN: Dr Randy Danielson  DISCHARGING PROVIDER: Randy Danielson MD    To contact this individual call [phone] and ask the  to page. If unavailable ask to be transferred the Adult Hospitalist Department. CONSULTATIONS: IP CONSULT TO GASTROENTEROLOGY    PROCEDURES/SURGERIES: Procedure(s):  INSERTION GASTROSTOMY TUBE    ADMITTING DIAGNOSES & HOSPITAL COURSE:   Dysphagia, secondary to esophageal stricture/likely esophageal carcinoma  -CXR empysema  -CT Neck 8/7 New masslike circumferential wall thickening of the lower cervical and upper  thoracic esophagus as described above, with severe narrowing/near occlusion of the esophagus at the level of the thoracic inlet, highly suspicious for esophageal carcinoma. Endoscopy is recommended. Area of irregular heterogeneous enhancement in the anterior supraglottic larynx measuring 1.0 x 0.6 cm, which may represent treatment-related changes or tumor recurrence. No evidence of cervical lymphadenopathy. Severe calcific atherosclerosis of the carotid bifurcations with at least mild stenosis of the proximal bilateral internal carotid arteries. Moderate centrilobular emphysema. Spiculated subpleural nodular opacity in the right upper lobe measuring 10 mm, which appears stable. follow-up chest CT in 6-12 months is recommended. New mild superior endplate compression deformities of the T4-T6 vertebral bodies since 2015, though these appear to be chronic.   Chronic left mandibular condyle fracture  -seen by SLP for swallow eval but deemed not safe to test. s/o as dysphagia sec to esophageal cancer recurrence  -s/p J tube 8/14, started tube feeds 8/15  -Tolerating very well     Esophageal mass, with history of base of tongue cancer with possible metastasis  -history of radiation and chemotherapy, and he had history of tracheostomy and PEG tube, which he has used for eight months.  The patient does not want any further chemo or radiation treatment.    -Please see Palliative note: Patient clear he does not want oncology or radiation oncology. The patient is ok with PEG tube  -[facility] onc [facility] but has not seen since 2014  -palliative care c/s.   -Pt is DNR     Right arm swelling  -likely sec to infiltration of fluids  -no need to look for DVT as would not change the management  -now resolved     Severe protein-calorie malnutrition, secondary to dysphagia and underlying malignancy.    -GI and  Nutritionist following.  -tube feeds started 8/15  -advance as per RD rec     Chronic T4, T6 compression deformity, stable.    -noted to be chronic findings on CT as above  - p.r.n. Tylenol and morphine.     COPD, stable.    -normal O2 sat on room air.   p.r.n. DuoNebs     Hypertension.    -p.r.n. IV hydralazine.     Hypokalemia  -replace as needed     DVT prophylaxis.  scd  Code: DNR   Functional status: independent with walker        DISCHARGE DIAGNOSES / PLAN:      1.   Dysphagia     ADDITIONAL CARE RECOMMENDATIONS:   Follow up with PMD    PENDING TEST RESULTS:   At the time of discharge the following test results are still pending: none    FOLLOW UP APPOINTMENTS:    Follow-up Information     Follow up With Specialties Details Why Contact Info    Avinash Cantor MD [facility] On 8/29/2019 Hospital f/u PCP appointment Thursday, 8/29/19 @ 2:20 p.m.  [address] [facility] [address]  [id number] [place] [address] Services  Tube Feeding  [address] [facility].  [phone]             DIET: PEG feeding     ACTIVITY: Activity as tolerated      DISCHARGE MEDICATIONS:  Discharge Medication List as of 8/20/2019  5:32 PM      START taking these medications    Details   pantoprazole (PROTONIX) 2 mg/mL susp oral suspension 20 mL by Per J Tube route Daily (before breakfast). , Print, Disp-1 Bottle, R-2      morphine (ROXANOL) 100 mg/5 mL (20 mg/mL) concentrated solution Take 0.13 mL by mouth every four (4) hours as needed for Pain for up to 3 days. Max Daily Amount: 15.6 mg., Print, Disp-1 Bottle, R-0      thiamine HCL (B-1) 100 mg tablet 1 Tab by Per G Tube route daily. Indications: refeeding risk, Print, Disp-5 Tab, R-0      therapeutic multivitamin (THERAGRAN) tablet Take 1 Tab by mouth daily. , Print, Disp-30 Tab, R-0      phosphorus (K PHOS NEUTRAL) 250 mg tablet 1 Tab by Per J Tube route two (2) times a day., Print, Disp-10 Tab, R-0         CONTINUE these medications which have CHANGED    Details   sucralfate (CARAFATE) 1 gram tablet 1 Tab by Per J Tube route Before breakfast, lunch, dinner and at bedtime. , Print, Disp-120 Tab, R-2         CONTINUE these medications which have NOT CHANGED    Details   naloxone (NARCAN) 4 mg/actuation nasal spray Use 1 spray intranasally into 1 nostril. Call 911. Use a new Narcan nasal spray for subsequent doses and administer into alternating nostrils. May repeat every 2 to 3 minutes as needed. , Print, Disp-1 Each, R-0         STOP taking these medications       oxyCODONE-acetaminophen (PERCOCET) 5-325 mg per tablet Comments:   Reason for Stopping:         amLODIPine (NORVASC) 10 mg tablet Comments:   Reason for Stopping:         L. acidoph & paracasei- S therm- Bifido (ELLEN-Q/RISAQUAD) 8 billion cell cap cap Comments:   Reason for Stopping:         pantoprazole (PROTONIX) 40 mg tablet Comments:   Reason for Stopping:         ferrous sulfate (IRON) 325 mg (65 mg iron) tablet Comments:   Reason for Stopping:         multivitamin (ONE A DAY) tablet Comments:   Reason for Stopping:                 NOTIFY YOUR PHYSICIAN FOR ANY OF THE FOLLOWING:   Fever over 101 degrees for 24 hours.    Chest pain, shortness of breath, fever, chills, nausea, vomiting, diarrhea, change in mentation, falling, weakness, bleeding. Severe pain or pain not relieved by medications. Or, any other signs or symptoms that you may have questions about.     DISPOSITION:   x Home With:   OT  PT  HH  RN       Long term SNF/Inpatient Rehab    Independent/assisted living    Hospice    Other:       PATIENT CONDITION AT DISCHARGE:     Functional status    Poor     Deconditioned    x Independent      Cognition    x Lucid     Forgetful     Dementia      Catheters/lines (plus indication)    Mar     PICC    x PEG     None      Code status     Full code    x DNR      PHYSICAL EXAMINATION AT DISCHARGE:  Please see progress note      CHRONIC MEDICAL DIAGNOSES:  Problem List as of 8/20/2019 Date Reviewed: 8/14/2019          Codes Class Noted - Resolved    * (Principal) Dysphagia ICD-10-CM: R13.10  ICD-9-CM: 787.20  8/7/2019 - Present        COPD (chronic obstructive pulmonary disease) ([facility] (Chronic) ICD-10-CM: J44.9  ICD-9-CM: 627  6/8/2018 - Present        Tobacco abuse ICD-10-CM: Z72.0  ICD-9-CM: 305.1  6/8/2018 - Present        Acute gastric ulcer with hemorrhage ICD-10-CM: K25.0  ICD-9-CM: 531.00  6/7/2018 - Present        Alcohol abuse ICD-10-CM: F10.10  ICD-9-CM: 305.00  6/7/2018 - Present        Hypertension ICD-10-CM: I10  ICD-9-CM: 401.9  6/7/2018 - Present        Hyponatremia ICD-10-CM: E87.1  ICD-9-CM: 276.1  6/7/2018 - Present        Fever and chills ICD-10-CM: R50.9  ICD-9-CM: 780.60  12/14/2015 - Present        Line sepsis [facility]) ICD-10-CM: T85.79XA, A41.9  ICD-9-CM: 996.62, 038.9, 995.91  12/14/2015 - Present        Lung mass ICD-10-CM: R91.8  ICD-9-CM: 786.6  11/23/2015 - Present        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

## 2022-08-02 NOTE — PROGRESS NOTES
NUTRITION brief    Pt tolerating bolus feeds of Jevity 1.5. He wants to go home. He has been accepted to [facility] for home management of TF and supply. Representative is here presently to instruct on home use. Pt has clear liquids for pleasure, but refuses most of them per nursing. Labs today look good. Pt has been on thiamine and folic acid for 6 days now (refeeding risk). Guidelines vary regarding length of time needed, but pt has been on for minimum time recommended and therefore defer need for continued use once pt is discharge home. Amount of TF pt is prescribed will meet daily RDIs. RD will continue to follow per protocol. Recent Labs     08/18/19  0224   GLU 99   BUN 8   CREA 0.55*   *   K 4.2   CL 98   CO2 30   CA 9.2   PHOS 3.5   MG 2.0         RECOMMENDATIONS:     1. Continue TF of Jevity 1.5 (total 4.5 cans daily) giving 1 can TID and 1.5 cans once daily for a total of 4 feeds/day. Flush tube with 90 mL H2O before and after each bolus for a total of 720 mL water daily. Pt may require more water for hydration and should monitor urine OP.   2. Goal provides 1600 kcal, 69 gm pro, 1530 mL free H2O    Ian Mckeon RD Mid-Level Procedure Text (A): After obtaining clear surgical margins the patient was sent to a mid-level provider for surgical repair.  The patient understands they will receive post-surgical care and follow-up from the mid-level provider.

## 2024-01-08 NOTE — PROGRESS NOTES
----- Message from DAKOTA Palma CNM sent at 1/8/2024  9:13 AM EST -----  Please call Jose and get her scheduled for an appointment for whatever her problem is.    DAKOTA Palma CNM      Mr. Federico Elena has no c/o today. Tm 98.4 HR: 88 BP: 142/88 Resp Rate: 18 98% sat on room air. Intake/Output Summary (Last 24 hours) at 8/13/2019 1100  Last data filed at 8/13/2019 1039  Gross per 24 hour   Intake    Output 1250 ml   Net -1250 ml   Exam: Cor: RRR. Lungs: Bilateral breath sounds. Clear to auscultation. Abd: Soft. Non distended. Non tender. No guarding or rebound. Labs: No results found for this or any previous visit (from the past 12 hour(s)). Discussed gastrostomy tube insertion with Mr. Federico Elena again today. Also explained to him that a feeding jejunostomy may be placed if gastrostomy tube insertion is not feasible. Described procedure to him including risks of bleeding, infection, enteric leak. He understands and wishes to proceed. Consent on chart. Clear liquid diet for now. NPO after midnight. To OR 8/14/2019. Plans per Dr. Peg Chicas. Following.

## 2024-12-31 NOTE — PROGRESS NOTES
NUTRITION     Pt discussed in rounds. To have G-tube vs J-tube placed today (if unable to place G-tube, will have J-tube). Pt is at high risk for refeeding. Discussed with Dr. Corey Noonan. Plan as below. RD will follow-up with TF tolerance and labs. RECOMMENDATIONS:   1. Once tube placed and OK to use, recommend:  · Jevity 1.5 starting at 25 mL/hr x 24 hours  · If electrolytes WNL, increase rate by 10 mL q 12 hours to goal of 45 mL/hr  · Give 120 mL H2O flushes q 4 hours  · Goal provides 1620 kcal, 69 gm pro, 1540 mL free H2O  2. Pt is at high risk for refeeding  · Add 100 mg thiamine for 10 days; MVI daily (for folic acid)  · Monitor K+, Mg, Phos and replete as needed. Avoid advancing TF until electrolytes corrected.         Jose Antonio Arroyo RD Female

## (undated) DEVICE — CONTAINER SPEC 20 ML LID NEUT BUFF FORMALIN 10 % POLYPR STS

## (undated) DEVICE — 1200 GUARD II KIT W/5MM TUBE W/O VAC TUBE: Brand: GUARDIAN

## (undated) DEVICE — SUTURE PERMAHAND SZ 2-0 L18IN NONABSORBABLE BLK L26MM PS 1588H

## (undated) DEVICE — SOLIDIFIER MEDC 1200ML -- CONVERT TO 356117

## (undated) DEVICE — MALECOT NEPHROSTOMY ACCESSORY SET: Brand: MALECOT

## (undated) DEVICE — TOWEL SURG W17XL27IN STD BLU COT NONFENESTRATED PREWASHED

## (undated) DEVICE — NEEDLE HYPO 22GA L1.5IN BLK S STL HUB POLYPR SHLD REG BVL

## (undated) DEVICE — DBD-PACK,LAPAROTOMY,2 REINFORCED GOWNS: Brand: MEDLINE

## (undated) DEVICE — SYR 10ML LUER LOK 1/5ML GRAD --

## (undated) DEVICE — BASIN EMSIS 16OZ GRAPHITE PLAS KID SHP MOLD GRAD FOR ORAL

## (undated) DEVICE — INFECTION CONTROL KIT SYS

## (undated) DEVICE — ROCKER SWITCH PENCIL BLADE ELECTRODE, HOLSTER: Brand: EDGE

## (undated) DEVICE — SUTURE MCRYL SZ 4-0 L27IN ABSRB UD L19MM PS-2 1/2 CIR PRIM Y426H

## (undated) DEVICE — BAG DRAIN BILE TUBE T 19OZ --

## (undated) DEVICE — SOLUTION IV 1000ML 0.9% SOD CHL

## (undated) DEVICE — MEDI-VAC YANK SUCT HNDL W/TPRD BULBOUS TIP: Brand: CARDINAL HEALTH

## (undated) DEVICE — STRAP,POSITIONING,KNEE/BODY,FOAM,4X60": Brand: MEDLINE

## (undated) DEVICE — (D)PREP SKN CHLRAPRP APPL 26ML -- CONVERT TO ITEM 371833

## (undated) DEVICE — TOWEL 4 PLY TISS 19X30 SUE WHT

## (undated) DEVICE — TUBING HYDR IRR --

## (undated) DEVICE — BAG SPEC BIOHZRD 10 X 10 IN --

## (undated) DEVICE — SYR 3ML LL TIP 1/10ML GRAD --

## (undated) DEVICE — REM POLYHESIVE ADULT PATIENT RETURN ELECTRODE: Brand: VALLEYLAB

## (undated) DEVICE — FORCEPS BX L160CM DIA8MM GRSP DISECT CUP TIP NONLOCKING ROT

## (undated) DEVICE — SUTURE VCRL SZ 2-0 L27IN ABSRB UD L26MM SH 1/2 CIR J417H

## (undated) DEVICE — HANDLE LT SNAP ON ULT DURABLE LENS FOR TRUMPF ALC DISPOSABLE

## (undated) DEVICE — SYRINGE MED 20ML STD CLR PLAS LUERLOCK TIP N CTRL DISP

## (undated) DEVICE — SURGICAL PROCEDURE PACK BASIN MAJ SET CUST NO CAUT

## (undated) DEVICE — Device

## (undated) DEVICE — NEEDLE HYPO 18GA L1.5IN PNK S STL HUB POLYPR SHLD REG BVL

## (undated) DEVICE — CATH IV AUTOGRD BC PNK 20GA 25 -- INSYTE

## (undated) DEVICE — SYR 50ML LR LCK 1ML GRAD NSAF --

## (undated) DEVICE — BLOCK BITE ENDOSCP AD 21 MM W/ DIL BLU LF DISP

## (undated) DEVICE — NEONATAL-ADULT SPO2 SENSOR: Brand: NELLCOR

## (undated) DEVICE — SUTURE VCRL SZ 1 L36IN ABSRB UD L36MM CT-1 1/2 CIR J947H

## (undated) DEVICE — SET ADMIN 16ML TBNG L100IN 2 Y INJ SITE IV PIGGY BK DISP

## (undated) DEVICE — SPONGE DRAIN NONWOVEN 4X4IN -- 2/PK

## (undated) DEVICE — SPONGE GZ W4XL4IN COT 12 PLY TYP VII WVN C FLD DSGN

## (undated) DEVICE — DRAPE SURG W41XL74IN CLR FULL SZ C ARM 3 ADH POLY STRP E

## (undated) DEVICE — STERILE POLYISOPRENE POWDER-FREE SURGICAL GLOVES WITH EMOLLIENT COATING: Brand: PROTEXIS

## (undated) DEVICE — KENDALL RADIOLUCENT FOAM MONITORING ELECTRODE RECTANGULAR SHAPE: Brand: KENDALL

## (undated) DEVICE — Z DISCONTINUED PER MEDLINE LINE GAS SAMPLING O2/CO2 LNG AD 13 FT NSL W/ TBNG FILTERLINE

## (undated) DEVICE — SUTURE PERMAHAND SZ 2-0 L18IN NONABSORBABLE BLK L26MM SH C012D